# Patient Record
Sex: MALE | Race: WHITE | NOT HISPANIC OR LATINO | URBAN - METROPOLITAN AREA
[De-identification: names, ages, dates, MRNs, and addresses within clinical notes are randomized per-mention and may not be internally consistent; named-entity substitution may affect disease eponyms.]

---

## 2023-09-13 PROBLEM — F41.9 ANXIETY: Status: ACTIVE | Noted: 2023-09-13

## 2023-09-13 PROBLEM — I10 ESSENTIAL HYPERTENSION: Status: ACTIVE | Noted: 2023-09-13

## 2023-09-13 PROBLEM — D64.9 ABSOLUTE ANEMIA: Status: ACTIVE | Noted: 2023-09-13

## 2023-09-13 PROBLEM — K21.9 GASTROESOPHAGEAL REFLUX DISEASE: Status: ACTIVE | Noted: 2023-09-13

## 2023-09-13 PROBLEM — I83.11 VARICOSE VEINS OF RIGHT LOWER EXTREMITY WITH INFLAMMATION: Status: ACTIVE | Noted: 2023-09-13

## 2023-09-13 PROBLEM — I21.9 ACUTE MYOCARDIAL INFARCTION (MULTI): Status: ACTIVE | Noted: 2023-09-13

## 2023-09-13 PROBLEM — Z91.89 AT RISK FOR BLEEDING: Status: ACTIVE | Noted: 2023-09-13

## 2023-09-13 PROBLEM — G47.10 DAYTIME HYPERSOMNIA: Status: ACTIVE | Noted: 2023-09-13

## 2023-09-13 PROBLEM — I25.118 ATHEROSCLEROTIC HEART DISEASE OF NATIVE CORONARY ARTERY WITH OTHER FORMS OF ANGINA PECTORIS (CMS-HCC): Status: ACTIVE | Noted: 2023-09-13

## 2023-09-13 PROBLEM — E78.2 MIXED HYPERLIPIDEMIA: Status: ACTIVE | Noted: 2023-09-13

## 2023-09-13 PROBLEM — G47.33 OSA (OBSTRUCTIVE SLEEP APNEA): Status: ACTIVE | Noted: 2023-09-13

## 2023-09-13 PROBLEM — E34.9 HYPOTESTOSTERONEMIA: Status: ACTIVE | Noted: 2023-09-13

## 2023-09-13 PROBLEM — E66.01 MORBID OBESITY (MULTI): Status: ACTIVE | Noted: 2023-09-13

## 2023-09-13 PROBLEM — E11.9 DIABETES MELLITUS WITHOUT COMPLICATION (MULTI): Status: ACTIVE | Noted: 2023-09-13

## 2023-09-13 RX ORDER — FUROSEMIDE 40 MG/1
40 TABLET ORAL DAILY
COMMUNITY
End: 2024-02-07 | Stop reason: WASHOUT

## 2023-09-13 RX ORDER — NAPROXEN SODIUM 220 MG/1
81 TABLET, FILM COATED ORAL DAILY
COMMUNITY

## 2023-09-13 RX ORDER — METOPROLOL SUCCINATE 100 MG/1
1 TABLET, EXTENDED RELEASE ORAL 2 TIMES DAILY
COMMUNITY
End: 2024-03-01

## 2023-09-13 RX ORDER — CYANOCOBALAMIN 1000 UG/ML
1000 INJECTION, SOLUTION INTRAMUSCULAR; SUBCUTANEOUS
COMMUNITY
Start: 2023-02-02 | End: 2023-12-21 | Stop reason: SDUPTHER

## 2023-09-13 RX ORDER — INSULIN HUMAN 100 [IU]/ML
100 INJECTION, SOLUTION PARENTERAL DAILY
COMMUNITY
End: 2023-11-15 | Stop reason: SDUPTHER

## 2023-09-13 RX ORDER — RANOLAZINE 500 MG/1
500 TABLET, EXTENDED RELEASE ORAL 2 TIMES DAILY
COMMUNITY

## 2023-09-13 RX ORDER — TADALAFIL 20 MG/1
20 TABLET ORAL DAILY PRN
COMMUNITY
Start: 2022-11-11 | End: 2024-02-07 | Stop reason: WASHOUT

## 2023-09-13 RX ORDER — LOSARTAN POTASSIUM 50 MG/1
1 TABLET ORAL 2 TIMES DAILY
COMMUNITY
End: 2024-02-16

## 2023-09-13 RX ORDER — ATORVASTATIN CALCIUM 40 MG/1
1 TABLET, FILM COATED ORAL DAILY
COMMUNITY
End: 2024-03-08

## 2023-09-13 RX ORDER — INSULIN DEGLUDEC 100 U/ML
46 INJECTION, SOLUTION SUBCUTANEOUS 2 TIMES DAILY
COMMUNITY
End: 2024-05-10 | Stop reason: SDUPTHER

## 2023-09-13 RX ORDER — DILTIAZEM HYDROCHLORIDE 120 MG/1
120 TABLET, FILM COATED ORAL 2 TIMES DAILY
COMMUNITY
End: 2024-04-15

## 2023-09-13 RX ORDER — NITROGLYCERIN 0.4 MG/1
0.4 TABLET SUBLINGUAL EVERY 5 MIN PRN
COMMUNITY

## 2023-09-13 RX ORDER — FERROUS SULFATE 325(65) MG
65 TABLET, DELAYED RELEASE (ENTERIC COATED) ORAL
COMMUNITY
Start: 2023-02-06 | End: 2024-02-07 | Stop reason: ALTCHOICE

## 2023-10-26 NOTE — PROGRESS NOTES
"Verbal consent of the patient and/or verbal parental consent for patients under the age of 18 have been obtained to conduct a physical examination at this office visit.    Established patient  History Of Present Illness  10/30/23Ned Ernandez is a 43 y.o. male who presents to review his BILATERAL knee MRI which was performed at [facility]. Pt continues to have bilateral knee pain R >L. Pt reports intermittent pain on lateral and posterior knees. Worse with standing for long periods of time and with exertion. Currently treating pain with Tylenol Arthritis, Move Free, and turmeric-curcumin supplements and ice. Pt has been wearing good supportive shoes and inserts and wears compression stockings. Pt completed physical therapy and is continuing with HEP. Pt feels pain has lessened in frequency since starting supplements and doing PT. Walking up and down stairs causes pain as he has to go one foot at a time. He is an  and sits typically all day.  He would like to start injections as soon as possible.  He says that since he is plateaued so much during physical therapy and it has not helped much she is hoping that the injections will help.  I told them are going to have to get approval for Zilretta injections since he is diabetic for his right knee for his distal IT band syndrome friction syndrome.  Also had to get approval for viscosupplementation injections as well.  We also discussed he has to talk to his cardiologist who prescribed his blood thinner to see what they want us to do for stopping before injections and keeping him off of it after injections.    Last Recorded Vitals  /70   Pulse 69   Ht 1.727 m (5' 8\")   Wt (!) 181 kg (400 lb)   BMI 60.82 kg/m²      All previous Progress Notes and imaging results related to this patients chief complaint have been reviewed in preparation for this examination.    Past Medical History  He has a past medical history of Diabetes mellitus type 1 " (CMS/[facility]) and MI (myocardial infarction) (CMS/[facility]).    Surgical History  He has a past surgical history that includes Cardiac catheterization (11/2014); Arterial bypass surgry (12/2014); Coronary stent placement (08/31/2020); IR ablation vein (11/2022); and Spider vein injection (Right, 11/2022).     Social History  He reports that he has never smoked. He has never used smokeless tobacco. He reports that he does not currently use alcohol. He reports that he does not use drugs.    Family History  Family History   Problem Relation Name Age of Onset    No Known Problems Mother      No Known Problems Father      No Known Problems Sister      Diabetes Maternal Grandfather      Cancer Paternal Grandmother      Diabetes Paternal Grandmother      Heart disease Paternal Grandfather          Allergies  Patient has no known allergies.    Historical Clinical Intake  July 7, 2023 Verbal consent of the patient and/or verbal parental consent for patients under the age of 18 have been obtained to conduct a physical examination at this office visit. Ned is a 42 year old male who was graciously referred into the office by Dr. López who present for an initial evaluation of his BILATERAL knee pain R>L. He reports a history of RIGHT knee pain with an injury occurring at 18 years old while playing racket ball. He describes his RIGHT knee pain as a throbbing ache proximal to the posterolateral aspect of his patella that will radiate into his right Achilles tendon. He states his LEFT knee pain has gotten significantly worse since his compensating for his right knee occurs along the medial aspect which he also describes as a throbbing ache. Ned has positive crepitus with reported popping with ambulation. He denies any numbness, tingling or instability. He rates his pain at 6/10 today. He takes OTC Aleve alternating with OTC Tylenol and elevates his BLE for pain management with minimal to no relief. His PCP, Dr. López, ordered an US of his  RLE which was negative for DVT prior to this examination. I stressed to him the importance based off his medical history of having open heart surgery already that he should not be taking any non steroid anti-inflammatory drugs whatsoever he should only be taking Tylenol. We talked in detail about extra supplements that he could take. We also talked in detail about a significant amount of weight gain that he needs to start being very attentive to is very active and we talked about modifications and exercises that he can do regularly.  ------------------------  September 20, 2023 Verbal consent of the patient and/or verbal parental consent for patients under the age of 18 have been obtained to conduct a physical examination at this office visit. Ned is a newly established patient who is present here today for a reevaluation of his BILATERAL knees. Ned states that he has not noticed any significant change in symptoms; and he continues to c/o bony pain along the posterior medial aspect of both knees He is taking Curcumin/Tumeric and OTC Move Free as directed previously and is also taking Tylenol Arthritis with no change in symptoms. He is unable to take NSAIDS due to history of heart surgery. He has completed 6-8 weeks of physical therapy with no significant improvement in knee pain and is continuing therapy and performing his home exercise program. He was referred back to our office for reevaluation as he was not improving. He states that his RIGHT knee hurts more than his LEFT. Pain is currently 4/10 in right posterior lateral knee and 1/10 on the LEFT knee. Pain is worse with climbing stairs, squatting, and prolonged walking. He was referred back to office by PT because not getting any better and actuaaly getting worse.      General Examination:  GENERAL APPEARANCE: appears well, pleasant, and cooperative, NAD.   HEENT: normal, unremarkable.   NECK: supple.   HEART: RRR, normal S1S2.   LUNGS: clear to auscultation  bilaterally.   ABDOMEN: soft, NT/ND, BS present.   EXTREMITIES: no cyanosis, clubbing.   SKIN: no rash or cellulitis.   NEUROLOGIC EXAM: awake, A&O x 3, CN's II-XII grossly intact.   PSYCH: good eye contact, appropriate mood and affect .            General (KNEE):  Location:  BILATERAL RIGHT laterally more so than LEFT   Erythema: Negative.   Edema:  Positive: BILATERAL .   Effusion: Negative.   Warmth: Negative.   Percussion Test: Positive: BILATERAL Posterior Lateral Tibial Plateau , Distal Lateral Femoral Condyle.   Tuning Fork Test: Positive: BILATERAL Posterior Lateral Tibial Plateau , Distal Lateral Femoral Condyle.   Ecchymosis/Bruising: Negative.   Abrasions: Negative.   Palpation: Positive: BILATERAL Tenderness to Palpation over the medial joint line as well as around the medial lateral patellar retinaculum, distal IT bands right more than left, Posterior Lateral Tibial Plateau , Distal Lateral Femoral Condyle.   Baker's Cyst: Negative.   Orientation: Symmetrical.   Range of Motion: FROM, Not Painful  Knee Flexion ( 0-135 degrees)  Knee Extension ( 0-15 degrees)  Hip Flexion (120-130 degrees)  Hip Extension (10-20 degrees)  Hip ABduction towards body (45 degrees)  Hip ADduction away from body (30 degrees)  Internal Rotation (45 degrees)          External Rotation (50 degrees).   All findings remain the same since last visit with no relief.     Muscle Strength:   +5/+5 Quadricep Extension  +5/+5 Hamstring Flexion  +5+5 Hip Flexion  +5+5 Hip Extension  +5/+5 Hip ABduction away from body  +5/+5 Hip ADduction towards body  +5/+5 Hip Internal Rotation  +5/+5 Hip External Rotation  +5+5 IT-Band  +5+5 Gastrocnemius  +5+5 Soleus.     DTR/Neurological:   +2/+4 Patellar (L4)  +2/+4 Posterior Tibialis Reflex and Medial Hamstrings (L5)  +2/+4 Achilles (S1).            Sensation/Neurological Lumbar:    Negative, Sensation intact:  L1: Low back, hips, and groin  L2: Low back and front of inside of upper leg/thigh  L3:  Low back and front of upper leg/thigh  L4: Low back, front of upper leg/thigh, front of lower leg/calf, front of medial area of knee, and inside of ankle  L5: Low back, front and lateral knee, front and outside of lower leg/calf, top and bottom of foot and first four toes especially big toe.     Sensation/Neurological Sacrum:   Negative, Sensation Intact, 2 -Point Discrimination Test: Negative  S1: low back, back of upper leg/thigh, back and inside of lower leg, calf and little toe  S2: Buttocks, genitals, back of upper leg/thigh and calves  S3: Buttocks and genitals  S4: Buttocks  S5: Buttocks.   Pain with Squat:  Positive:.   Pain with Sumo Squat:  Positive:.   Vascular: +2/+4 Femoral, +2/+4 Dorsalis Pedis, +2/+4 Posterior Tibial  Capillary Refill less than 2 seconds.   Feet/Foot: Positive Valgus Foot Deformity (Pes Planus) BILATERAL.   Leg length discrepancy:  Positive: Noted on physical examination standing erect pelvis x-ray shows minimal leg-length discrepancy.   All findings remain the same since last visit with no relief.      Diagnostic studies:  Standing erect pelvis x-ray shows the following:  Left leg shorter than right leg at the iliac crest by 6 millimeters mm  Right leg shorter than left leg at Sacral Base by 6 millimeters mm  Left leg shorter than right leg at midline Femoral Heads by 4 mm  Overall difference left leg shorter than right leg by approximately 4 millimeters                    PROCEDURE: PELVIS 1 OR 2 VIEW - TXR 0039  REASON FOR EXAM: LEG LENGTH DISCREPANCY  RESULT: MRN: [id number] Patient Name: KENDALL HITCHCOCK  STUDY: PELVIS 1 OR 2 VIEW 7/7/2023 8:15 am  INDICATION: LEG LENGTH DISCREPANCY  COMPARISON: None available.    ACCESSION NUMBER(S): [id number]  ORDERING CLINICIAN: AUSTEN BONNER    TECHNIQUE: Erect AP view of the pelvis    FINDINGS:  Normal mineralization. The hip joint space is maintained bilaterally. No fracture, dislocation, or bony abnormality is seen. The pelvic ring is intact  without fracture or disruption.    IMPRESSION PELVIS XR:  Normal erect AP view of the pelvis    Dictation workstation: [id number]  Original Interpreting Physician: ISRRAEL STOVER M.D.  Original Transcribed by/Date: [facility] Jul 7 2023 7:42A  ----------------------------------------------------  PROCEDURE: KNEE BI MIN 4 VIEW - TXR 0220  REASON FOR EXAM: BILAT KNEE PAIN  RESULT: MRN: [id number] Patient Name: KENDALL HITCHCOCK  STUDY: KNEE BI MIN 4 VIEW 7/7/2023 8:15 am  INDICATION: BILAT KNEE PAIN  COMPARISON: None available.    ACCESSION NUMBER(S): [id number]  ORDERING CLINICIAN: AUSTEN BONNER    TECHNIQUE: Four views of each knee are completed with 3 of the images bilaterally performed in the erect position.    FINDINGS:  On the LEFT, there is narrowing of the medial femorotibial compartment as well as the patellofemoral compartment with mild osteophytosis of the patellar articular surface and a tiny spur projecting from medial tibial plateau. There is minimal suprapatellar effusion.    On the RIGHT, there are surgical clips along the posteromedial aspect of the distal portion of the right upper leg and right knee. The medial compartment is narrowed with small osteophytes projecting from the medial tibial plateau. Small spurs also arise from the patellar articular surface. The patellofemoral compartment is also narrowed.    IMPRESSION BILATERAL KNEE XR:  There is narrowing of the patellofemoral and medial compartments of each knee with the medial compartmental narrowing more pronounced on the right than on the left. There is mild osteophytosis of each knee observed as well.    Dictation workstation: [id number]  Original Interpreting Physician: ISRRAEL STOVER M.D.  Original Transcribed by/Date: [facility] Jul 7 2023 7:42A  ----------------------------------------------------  PROCEDURE: LOWER LEG RT NON VASC LIMITED - WUS 2647  REASON FOR EXAM: PAIN IN RIGHT KNEE  RESULT: MRN: [id number] Patient Name: KENDALL HITCHCOCK  STUDY:LOWER LEG RT NON  [facility] LIMITED; 6/16/2023 3:49 pm  INDICATION:PAIN IN RIGHT KNEE posteriorly.  COMPARISON:None    ACCESSION NUMBER(S):[id number]  ORDERING CLINICIAN:CHEKO WALSH    TECHNIQUE: Using a combination of gray scale ultrasound, spectral waveform analysis, compression technique, pulsed and color Doppler, the deep venous system of the right popliteal fossa was examined.                            FINDINGS:  No cystic or solid lesions are visualized in the right popliteal fossa; specifically no Baker's cyst is identified.  The popliteal vein is compressible and displays normal flow signal.  There is no evidence of deep venous thrombosis.    IMPRESSION RLE US:  Normal ultrasound of the right popliteal fossa.    Dictation workstation: [id number]  Original Interpreting Physician: ABBY PALMA M.D.  Original Transcribed by/Date: [facility] Jun 16 2023 3:04P              Knee - ACL:  Anterior Drawer Test: Negative.   Lachman Test: Negative.   Prone Lachman Test: Negative.   Lelli Test: Negative.   Pivot Shift Test: Negative.   Crossover Test: Negative.     KNEE - FAT PAD:  Squeeze Test Knee Bent going into extension: Negative.     KNEE - IT BAND:  Belkys Test:  Positive: Right greater than left.   Hobbs Test: Positive: Right greater than left.   All findings remain the same since last visit with no relief.     KNEE - MCL / LCL:  Valgus Stress Test:  90 degrees: Negative, 20-30 degrees: Negative.   Varus Stress Test:  90 degrees: Negative, 20-30 degrees: Negative.   Apley Distraction Test:  Positive:, Medial:, MCL:.   Thessaly Test:  Positive Medial meniscus.   All findings remain the same since last visit with no relief.     KNEE - MENISCUS:  Apley Compression Test:  Positive, Medial MCL.   Duck Walk Test:  Negative.   Stienmann 1 Test:  Negative.   Stienmann 2 Test:  Negative.   Porfirio Test:  Positive: BILATERAL lateral meniscus .   Bragard's Test:  Negative.   Bounce Home Test:  Negative.   Payr Test:  Negative.   Thessaly Test:   Positive:, Medial meniscus.   Yordan's Test:  Negative.   All findings remain the same since last visit with no relief.     KNEE - PATELLA:  Apprehension Test:  Negative.   Glide Test:  Negative.   Facet Tenderness Test:  Negative.   Medial Patellar Plica Test:  Negative.   Grind Test:  Positive:, Relieved with lateral pressure on patella.   Patella Tracking Test:  Negative.   Medial/Lateral Subluxation Test:  Negative.   Suppression Test:  Negative.   All findings remain the same since last visit with no relief.     Knee - PCL/POSTERIOR LATERAL CORNER:  Posterior Drawer Test: Negative.   Jimenes 90/90 (Sag Sign) Test: Negative.   Dial Test: Negative.   Reverse Lachman Test: Negative.   Quad Activation Test: Negative.   ER / Recurvatum Test: Negative.   Reverse Pivot Shift Test: Negative.     KNEE - POPLITEUS:  Jean Pierre's Test: Negative.     KNEE - QUADRICEPS:  Dreyer Test: Negative.   VMO Test: Negative.   VLO Test: Negative.     Hip/Pelvis - Sacrum:  Leg Length Supine:  Negative, Verified with standing erect pelvis X-ray  Leg Length Supine to Seated (Derbolowsky Sign):  Negative, Verified with standing erect pelvis X-ray   Standing Flexion Test:  Negative.   Seated Flexion Test:  Negative   Spring Test:  Negative   Sacral Somatic Dysfunction:  Negative   Sacroiliac (SI) Joint Fixation Test:  Negative   Hip Flexor Tightness:  Negative   Hamstring Tightness:  Positive.   All findings remain the same since last visit with no relief.     General (LOWER LEG/ANKLE/FEET):  Location: Right Achilles compensatory for knee.   Erythema: Negative.   Edema:  Positive, Diffusely, Posterior.   Effusion: Negative.   Warmth: Negative.   Ecchymosis/Bruising: Negative.   Percussion Test:  Positive, Calcaneus, Insertion of the Achilles Tendon.   Tuning Fork Test: Negative.   Abrasions: Negative.   Orientation: Positive Asymmetrical, because of swelling.   ROM: FROM, however pain with dorsiflexion.   All findings remain the same since  last visit with no relief.     Muscle Strength:    Positive:,  +3-+4/+5Planar Flexion Causes pain  +3-+4/+5 Dorsi Flexion Causes pain  +5/+5 Inversion  +5/+5 Eversion  +5/+5 Plantarflexion in combination with inversion  +5/+5 Plantarflexion in combination with eversion  +5/+5 Dorsiflexion in combination with inversion (Posterior Tibialis)  +5/+5 Dorsiflexion in combination with eversion (Peroneal Brevis)  +5/+5 Dorsiflexion in combination with eversion and flexion of great toe (Peroneal Longus).    All findings remain the same since last visit with no relief.         DTR/Neurological:   Sensation Intact, 2 Point Discrimination Test: Negative  +2/+4 Achilles Tendon Reflex (S1).            Sensation/Neurological:   Sensation Intact, 2 Point Discrimination Test: Negative  L3: Low back and front of upper leg/thigh  L4: Low back, front of upper leg/thigh, front of lower leg/calf, front of medial area of knee, and inside of ankle  L5: Low back, front and lateral knee, front and outside of lower leg/calf, top and bottom of foot, and first four toes especially big toe  S1: low back, back of upper leg/thigh, back and inside of lower leg, calf and little toe  S2: Buttocks, genitals, back of upper leg/thigh, and calves  S3: Buttocks and genitals.            Palpation:  Positive, Tender to Palpation in the following areas: achilles tendon, insertion of the achilles tendon, origin of the achilles tendon, and calcaneus.   All findings remain the same since last visit with no relief.         Vascular:   +2/+4 Dorsalis Pedis, +2/+4 Posterior Tibialis, Capillary Refill < 2 Seconds.     BILATERAL Valgus Foot Deformity.    Leg/Ankle/Foot - Ankle Impingement:  Posterior Ankle Impingement Test: Negative.   Anterior Ankle Impingement Test: Negative.     Leg/Ankle/Foot - Ankle Instability:  Flexibility Test:  Positive, decreased.   Anterior Drawer Test:  Negative.   Talar Tilt Test:  Negative.   External Rotation Kleiger Plantar Flexion  Test:  Negative.   External Rotation Kleiger Dorsiflexion Test:  Negative.   Squeeze Test:  Positive at distal achilles and calcaneus.   Dorsiflexion Test:  Positive.   Posterior Tibial or Peroneal Tendon Instability Test:  Negative.   Horizontal Squeeze Test:  Positive, at achilles and calcaneus.   Vertical Squeeze Test:  Positive, at achilles and calcaneus.   Plantarflexion:  Positive.   Standing/Walking Test:  Positive, Toe Standing, Heel Standing, Toe Walking, Heel Walking, Painful.   Proprioception Test:  Positive.   Hop Test:  Positive.   All findings remain the same since last visit with no relief.     Leg/Ankle/Foot - DVT:  Kim's Sign: Negative.     Leg/Ankle/Foot - Forefoot:  Strunsky Test:  Negative.   Gaenslen Maneuver Test:  Negative.   Metatarsal Tap Test:  Negative.   Crepitation Test:  Negative.     Leg/Ankle/Foot - Hindfoot Achilles/Calcaneus:  Zhu Compression Test:  Negative.   Hoffa Sign:  Negative.   Achilles Tendon Tap Test:  Positive.   Heel Compression Test:  Negative.   Heel Thump Test:  Positive.   All findings remain the same since last visit with no relief.     Leg/Ankle/Foot - Nerve Irritation:  Eloise Sign:  Negative.   Digital Nerve Stretch Test:  Negative.   Tinel Sign:  Positive at insertion of achilles into calcaneus.   Tourniquet Sign:  Negative.   All findings remain the same since last visit with no relief.       Assessment   1. Acute pain of right knee        2. Acute medial meniscal injury of right knee, subsequent encounter        3. Patellofemoral disorders, right knee        4. Iliotibial band syndrome of right side        5. Patellar tendinitis, right knee        6. Acute pain of left knee        7. Acute medial meniscal injury of left knee, subsequent encounter        8. Patellofemoral disorders, left knee        9. Iliotibial band syndrome of left side        10. Patellar tendinitis, left knee        11. Primary osteoarthritis of both knees        12. Hamstring  tightness        13. Right ankle pain, unspecified chronicity        14. Pain of lower leg, unspecified laterality        15. Achilles tendinitis of right lower extremity        16. Strain of Achilles tendon, unspecified laterality, subsequent encounter        17. Leg length discrepancy        18. Valgus deformity of both feet            Treatment or Intervention:  May continue to alternate ice and moist heat as needed  Once again, reviewed degenerative joint disease of the knees, patellofemoral tracking syndrome, and/or patellar tendinitis as well as meniscus injury in detail with the patient to the level of their understanding; a copy of this handout was provided to the patient at the time of this office visit.  Continue Physical Therapy exercises daily   once again, reviewed home exercises to be performed by the patient routinely  Once again stressed the importance of wearing shoes with good stability control to help with the biomechanics affecting the knees and lower extremities  Once again, stressed the importance of wearing full foot insoles to help with the biomechanics affecting the knees and lower extremities  Once again, recommendation over-the-counter vitamin-D 2 -3000+ milligrams a day, as well as a daily multivitamin.  Continue to take over-the-counter curcumin, turmeric, boswellia, as well as egg shell membrane as directed to aid with joint inflammation.  Continue to take over-the-counter Move Free for joint health.  Once again patient advised regarding the risks and/or potential adverse reactions and/or side effects of any prescribed medications along with any over-the-counter medications or any supplements used. Patient advised to seek immediate medical care if any adverse reactions occur. The patient and/or patient(s) parent(s) verbalized their understanding  Reviewed BILATERAL knee MRI in detail with the patient and/or patients parent/legal guardian to their level of understanding; a copy of these  results were provided to the patient and/or patients parent/legal guardian at the time of this office visit. Discussed treatment options with the patient and/or patients parent/legal guardian in detail to the level of their understanding. The patient and/or patients parent/legal guardian verbalized their understanding and agreement to the treatment plan at the time of this office visit.   Discussed in detail with the patient to the level of their understanding the possibility in the future of regenerative injections versus corticosteroid injections versus viscosupplementation injections versus a combination  Once again, talked in detail about exercise modifications and how to avoid repetitive overuse his knees.  Once again, talked about different diets as well as intermittent fasting  Once again, possibility in the future will have patient get 4 millimeter heel lift placed into his left shoe to accommodate leg-length discrepancy found on standing erect pelvis x-ray  Approval for joint lubrication injections as well as Zilretta since he is diabetic submitted from the office at this time today   Discuss with cardiologist when to get off and start back on the blood thinner before or after injections   Follow up once approval for joint lubrication and Zilretta or sooner if needed.     Diagnostic studies:  No additional imaging or laboratory studies are needed at this time.    Activity Instructions, Restrictions, and Accommodations:  The family has been provided a note (after visit summary) outlining all current activity instructions, restrictions, and accommodations.    Consultations/Referrals:  None at this time.    Follow-up Follow up once approval for joint lubrication and Zilretta or sooner if needed. Barbara REYNA, attest this documentation has been prepared under the direction and in the presence of Domi Woodruff D.O. By signing below, Jair REYNA D.O, personally performed the services described in  this documentation. All medical record entries made by the scribe were at my direction and in my presence. I have reviewed the chart and agree that the record reflects my personal performance and is accurate and complete. Please note that this report has been partially produced using speech recognition software. It may contain errors related to grammar, punctuation or spelling. Electronically signed, but not reviewed. Jair Woodruff D.O. Director of Sports Medicine.     LYSSA KRAUS on 10/30/23 at 4:04 PM.     Jair Woodruff DO, FAOASM

## 2023-10-30 ENCOUNTER — OFFICE VISIT (OUTPATIENT)
Dept: SPORTS MEDICINE | Facility: CLINIC | Age: 43
End: 2023-10-30
Payer: COMMERCIAL

## 2023-10-30 VITALS
HEART RATE: 69 BPM | HEIGHT: 68 IN | BODY MASS INDEX: 47.74 KG/M2 | SYSTOLIC BLOOD PRESSURE: 128 MMHG | DIASTOLIC BLOOD PRESSURE: 70 MMHG | WEIGHT: 315 LBS

## 2023-10-30 DIAGNOSIS — M22.2X1 PATELLOFEMORAL DISORDERS, RIGHT KNEE: ICD-10-CM

## 2023-10-30 DIAGNOSIS — M79.669 PAIN OF LOWER LEG, UNSPECIFIED LATERALITY: ICD-10-CM

## 2023-10-30 DIAGNOSIS — M25.562 ACUTE PAIN OF LEFT KNEE: ICD-10-CM

## 2023-10-30 DIAGNOSIS — S83.8X1D ACUTE MEDIAL MENISCAL INJURY OF RIGHT KNEE, SUBSEQUENT ENCOUNTER: ICD-10-CM

## 2023-10-30 DIAGNOSIS — M76.61 ACHILLES TENDINITIS OF RIGHT LOWER EXTREMITY: ICD-10-CM

## 2023-10-30 DIAGNOSIS — S86.019D: ICD-10-CM

## 2023-10-30 DIAGNOSIS — S83.8X2D ACUTE MEDIAL MENISCAL INJURY OF LEFT KNEE, SUBSEQUENT ENCOUNTER: ICD-10-CM

## 2023-10-30 DIAGNOSIS — M22.2X2 PATELLOFEMORAL DISORDERS, LEFT KNEE: ICD-10-CM

## 2023-10-30 DIAGNOSIS — M76.31 ILIOTIBIAL BAND SYNDROME OF RIGHT SIDE: ICD-10-CM

## 2023-10-30 DIAGNOSIS — M17.0 PRIMARY OSTEOARTHRITIS OF BOTH KNEES: ICD-10-CM

## 2023-10-30 DIAGNOSIS — M21.70 LEG LENGTH DISCREPANCY: ICD-10-CM

## 2023-10-30 DIAGNOSIS — M76.32 ILIOTIBIAL BAND SYNDROME OF LEFT SIDE: ICD-10-CM

## 2023-10-30 DIAGNOSIS — M76.51 PATELLAR TENDINITIS, RIGHT KNEE: ICD-10-CM

## 2023-10-30 DIAGNOSIS — M76.52 PATELLAR TENDINITIS, LEFT KNEE: ICD-10-CM

## 2023-10-30 DIAGNOSIS — M25.571 RIGHT ANKLE PAIN, UNSPECIFIED CHRONICITY: ICD-10-CM

## 2023-10-30 DIAGNOSIS — Q66.6 VALGUS DEFORMITY OF BOTH FEET: ICD-10-CM

## 2023-10-30 DIAGNOSIS — M25.561 ACUTE PAIN OF RIGHT KNEE: ICD-10-CM

## 2023-10-30 DIAGNOSIS — M62.89 HAMSTRING TIGHTNESS: ICD-10-CM

## 2023-10-30 PROCEDURE — 3078F DIAST BP <80 MM HG: CPT | Performed by: FAMILY MEDICINE

## 2023-10-30 PROCEDURE — 4010F ACE/ARB THERAPY RXD/TAKEN: CPT | Performed by: FAMILY MEDICINE

## 2023-10-30 PROCEDURE — 99214 OFFICE O/P EST MOD 30 MIN: CPT | Performed by: FAMILY MEDICINE

## 2023-10-30 PROCEDURE — 1036F TOBACCO NON-USER: CPT | Performed by: FAMILY MEDICINE

## 2023-10-30 PROCEDURE — 3074F SYST BP LT 130 MM HG: CPT | Performed by: FAMILY MEDICINE

## 2023-10-30 RX ORDER — GLUCOSAMINE/CHONDRO SU A 500-400 MG
1 TABLET ORAL 3 TIMES DAILY
COMMUNITY

## 2023-10-30 RX ORDER — DEXTROMETHORPHAN HYDROBROMIDE, GUAIFENESIN 5; 100 MG/5ML; MG/5ML
650 LIQUID ORAL EVERY 8 HOURS PRN
COMMUNITY
End: 2024-02-07 | Stop reason: ALTCHOICE

## 2023-10-30 ASSESSMENT — PAIN SCALES - GENERAL: PAINLEVEL_OUTOF10: 3

## 2023-10-30 ASSESSMENT — PAIN DESCRIPTION - DESCRIPTORS: DESCRIPTORS: SHARP

## 2023-10-30 ASSESSMENT — PAIN - FUNCTIONAL ASSESSMENT: PAIN_FUNCTIONAL_ASSESSMENT: 0-10

## 2023-10-30 NOTE — PATIENT INSTRUCTIONS
May continue to alternate ice and moist heat as needed  Once again, reviewed degenerative joint disease of the knees, patellofemoral tracking syndrome, and/or patellar tendinitis as well as meniscus injury in detail with the patient to the level of their understanding; a copy of this handout was provided to the patient at the time of this office visit.  Continue Physical Therapy 1-2 times a week for 8-10 weeks with manual therapy as well as dry needling and IASTM  Once again, reviewed home exercises to be performed by the patient routinely  Stressed the importance of wearing shoes with good stability control to help with the biomechanics affecting the knees and lower extremities  Once again, stressed the importance of wearing full foot insoles to help with the biomechanics affecting the knees and lower extremities  Once again, recommendation over-the-counter vitamin-D 2 -3000+ milligrams a day, as well as a daily multivitamin.  Continue to take over-the-counter curcumin, turmeric, boswellia, as well as egg shell membrane as directed to aid with joint inflammation.  Continue to take over-the-counter Move Free for joint health.  Once again patient advised regarding the risks and/or potential adverse reactions and/or side effects of any prescribed medications along with any over-the-counter medications or any supplements used. Patient advised to seek immediate medical care if any adverse reactions occur. The patient and/or patient(s) parent(s) verbalized their understanding  Reviewed BILATERAL knee MRI in detail with the patient and/or patients parent/legal guardian to their level of understanding; a copy of these results were provided to the patient and/or patients parent/legal guardian at the time of this office visit. Discussed treatment options with the patient and/or patients parent/legal guardian in detail to the level of their understanding. The patient and/or patients parent/legal guardian verbalized their  understanding and agreement to the treatment plan at the time of this office visit.   Discussed in detail with the patient to the level of their understanding the possibility in the future of regenerative injections versus corticosteroid injections versus viscosupplementation injections versus a combination  Once again, talked in detail about exercise modifications and how to avoid repetitive overuse his knees.  Once again, talked about different diets as well as intermittent fasting  Once again, possibility in the future will have patient get 4 millimeter heel lift placed into his left shoe to accommodate leg-length discrepancy found on standing erect pelvis x-ray  Approval for joint lubrication Zilretta submitted from the office at this time today   Discuss with cardiologist when to get off the blood thinner before or after injections   Follow up once approval for joint lubrication and Zilretta or sooner if needed.

## 2023-11-02 ENCOUNTER — TELEPHONE (OUTPATIENT)
Dept: CARDIOLOGY | Facility: CLINIC | Age: 43
End: 2023-11-02
Payer: COMMERCIAL

## 2023-11-06 ENCOUNTER — TELEPHONE (OUTPATIENT)
Dept: SPORTS MEDICINE | Facility: CLINIC | Age: 43
End: 2023-11-06
Payer: COMMERCIAL

## 2023-11-06 NOTE — TELEPHONE ENCOUNTER
10/30/2023-Initiated PA for Zilretta injection (Cortisone) VIA [employer].     11/06/2022- Per [employer]-A prior authorization is required for this patient's plan.  [employer] can assist with the prior authorization. For Prior  Auth support, please fax clinical information to [phone],  or upload it using the online portal. No medical notes or  referrals needed. Patient has a Fully Funded, Commercial  HMO plan through employer [employer] with an  effective date of 01/01/2023. Plan follows [facility] guidelines.  (Zilretta), CPT code  77245 and specialist office visits are covered at 100% by the  payer at the contracted rate with no patient responsibility.  Patient has a deductible of $3000. Deductible has been met.  Out of pocket does not apply to these services. Patient has a  calendar year policy starting from January 1, 2023 to  December 31, 2023.    11/06/2023-Clinical documentation uploaded awaiting on Prior Authorization.

## 2023-11-11 ENCOUNTER — LAB (OUTPATIENT)
Dept: LAB | Facility: LAB | Age: 43
End: 2023-11-11
Payer: COMMERCIAL

## 2023-11-11 DIAGNOSIS — E10.9 TYPE 1 DIABETES MELLITUS WITHOUT COMPLICATIONS (MULTI): Primary | ICD-10-CM

## 2023-11-11 LAB
ALBUMIN SERPL-MCNC: 3.9 G/DL (ref 3.5–5)
ALP BLD-CCNC: 103 U/L (ref 35–125)
ALT SERPL-CCNC: 20 U/L (ref 5–40)
ANION GAP SERPL CALC-SCNC: 16 MMOL/L
AST SERPL-CCNC: 19 U/L (ref 5–40)
BASOPHILS # BLD AUTO: 0.07 X10*3/UL (ref 0–0.1)
BASOPHILS NFR BLD AUTO: 0.7 %
BILIRUB SERPL-MCNC: 0.4 MG/DL (ref 0.1–1.2)
BUN SERPL-MCNC: 12 MG/DL (ref 8–25)
CALCIUM SERPL-MCNC: 9.2 MG/DL (ref 8.5–10.4)
CHLORIDE SERPL-SCNC: 100 MMOL/L (ref 97–107)
CHOLEST SERPL-MCNC: 127 MG/DL (ref 133–200)
CHOLEST/HDLC SERPL: 2.2 {RATIO}
CO2 SERPL-SCNC: 24 MMOL/L (ref 24–31)
CREAT SERPL-MCNC: 1 MG/DL (ref 0.4–1.6)
CREAT UR-MCNC: 139 MG/DL
EOSINOPHIL # BLD AUTO: 0.21 X10*3/UL (ref 0–0.7)
EOSINOPHIL NFR BLD AUTO: 2.1 %
ERYTHROCYTE [DISTWIDTH] IN BLOOD BY AUTOMATED COUNT: 13.8 % (ref 11.5–14.5)
GFR SERPL CREATININE-BSD FRML MDRD: >90 ML/MIN/1.73M*2
GLUCOSE SERPL-MCNC: 178 MG/DL (ref 65–99)
HCT VFR BLD AUTO: 47.8 % (ref 41–52)
HDLC SERPL-MCNC: 57 MG/DL
HGB BLD-MCNC: 14.9 G/DL (ref 13.5–17.5)
IMM GRANULOCYTES # BLD AUTO: 0.03 X10*3/UL (ref 0–0.7)
IMM GRANULOCYTES NFR BLD AUTO: 0.3 % (ref 0–0.9)
LDLC SERPL CALC-MCNC: 56 MG/DL (ref 65–130)
LYMPHOCYTES # BLD AUTO: 2.99 X10*3/UL (ref 1.2–4.8)
LYMPHOCYTES NFR BLD AUTO: 29.5 %
MCH RBC QN AUTO: 27.1 PG (ref 26–34)
MCHC RBC AUTO-ENTMCNC: 31.2 G/DL (ref 32–36)
MCV RBC AUTO: 87 FL (ref 80–100)
MICROALBUMIN UR-MCNC: <12 MG/L (ref 0–23)
MICROALBUMIN/CREAT UR: NORMAL MG/G{CREAT}
MONOCYTES # BLD AUTO: 0.85 X10*3/UL (ref 0.1–1)
MONOCYTES NFR BLD AUTO: 8.4 %
NEUTROPHILS # BLD AUTO: 6 X10*3/UL (ref 1.2–7.7)
NEUTROPHILS NFR BLD AUTO: 59 %
NRBC BLD-RTO: 0 /100 WBCS (ref 0–0)
PLATELET # BLD AUTO: 327 X10*3/UL (ref 150–450)
POTASSIUM SERPL-SCNC: 4.8 MMOL/L (ref 3.4–5.1)
PROT SERPL-MCNC: 6.7 G/DL (ref 5.9–7.9)
RBC # BLD AUTO: 5.49 X10*6/UL (ref 4.5–5.9)
RBC #/AREA URNS AUTO: NORMAL /HPF
SODIUM SERPL-SCNC: 140 MMOL/L (ref 133–145)
SQUAMOUS #/AREA URNS AUTO: NORMAL /HPF
TRIGL SERPL-MCNC: 70 MG/DL (ref 40–150)
URATE SERPL-MCNC: 4 MG/DL (ref 3.6–7.7)
VIT B12 SERPL-MCNC: 938 PG/ML (ref 211–946)
WBC # BLD AUTO: 10.2 X10*3/UL (ref 4.4–11.3)
WBC #/AREA URNS AUTO: NORMAL /HPF

## 2023-11-11 PROCEDURE — 81001 URINALYSIS AUTO W/SCOPE: CPT

## 2023-11-11 PROCEDURE — 80061 LIPID PANEL: CPT

## 2023-11-11 PROCEDURE — 80053 COMPREHEN METABOLIC PANEL: CPT

## 2023-11-11 PROCEDURE — 82043 UR ALBUMIN QUANTITATIVE: CPT

## 2023-11-11 PROCEDURE — 82570 ASSAY OF URINE CREATININE: CPT

## 2023-11-11 PROCEDURE — 82607 VITAMIN B-12: CPT

## 2023-11-11 PROCEDURE — 85025 COMPLETE CBC W/AUTO DIFF WBC: CPT

## 2023-11-11 PROCEDURE — 84550 ASSAY OF BLOOD/URIC ACID: CPT

## 2023-11-11 PROCEDURE — 36415 COLL VENOUS BLD VENIPUNCTURE: CPT

## 2023-11-15 ENCOUNTER — OFFICE VISIT (OUTPATIENT)
Dept: PRIMARY CARE | Facility: CLINIC | Age: 43
End: 2023-11-15
Payer: COMMERCIAL

## 2023-11-15 VITALS
TEMPERATURE: 98.1 F | BODY MASS INDEX: 47.74 KG/M2 | HEIGHT: 68 IN | SYSTOLIC BLOOD PRESSURE: 140 MMHG | HEART RATE: 69 BPM | WEIGHT: 315 LBS | DIASTOLIC BLOOD PRESSURE: 64 MMHG | OXYGEN SATURATION: 95 %

## 2023-11-15 DIAGNOSIS — I10 ESSENTIAL HYPERTENSION: ICD-10-CM

## 2023-11-15 DIAGNOSIS — M65.342 TRIGGER RING FINGER OF LEFT HAND: ICD-10-CM

## 2023-11-15 DIAGNOSIS — E11.9 DIABETES MELLITUS WITHOUT COMPLICATION (MULTI): Primary | ICD-10-CM

## 2023-11-15 LAB — POC HEMOGLOBIN A1C: 8 % (ref 4.2–6.5)

## 2023-11-15 PROCEDURE — 3048F LDL-C <100 MG/DL: CPT | Performed by: FAMILY MEDICINE

## 2023-11-15 PROCEDURE — 4010F ACE/ARB THERAPY RXD/TAKEN: CPT | Performed by: FAMILY MEDICINE

## 2023-11-15 PROCEDURE — 3062F POS MACROALBUMINURIA REV: CPT | Performed by: FAMILY MEDICINE

## 2023-11-15 PROCEDURE — 1036F TOBACCO NON-USER: CPT | Performed by: FAMILY MEDICINE

## 2023-11-15 PROCEDURE — 99213 OFFICE O/P EST LOW 20 MIN: CPT | Performed by: FAMILY MEDICINE

## 2023-11-15 PROCEDURE — 3078F DIAST BP <80 MM HG: CPT | Performed by: FAMILY MEDICINE

## 2023-11-15 PROCEDURE — 3077F SYST BP >= 140 MM HG: CPT | Performed by: FAMILY MEDICINE

## 2023-11-15 PROCEDURE — 83036 HEMOGLOBIN GLYCOSYLATED A1C: CPT | Performed by: FAMILY MEDICINE

## 2023-11-15 RX ORDER — POLYMYXIN B SULFATE AND TRIMETHOPRIM SULFATE 100000; 1 [USP'U]/ML; MG/ML
1 SOLUTION/ DROPS OPHTHALMIC EVERY 6 HOURS
COMMUNITY
Start: 2023-06-09

## 2023-11-15 RX ORDER — SYRINGE WITH NEEDLE, 1 ML 25GX5/8"
1 SYRINGE, EMPTY DISPOSABLE MISCELLANEOUS
COMMUNITY
Start: 2023-10-01 | End: 2024-01-08

## 2023-11-15 RX ORDER — INSULIN HUMAN 100 [IU]/ML
INJECTION, SOLUTION PARENTERAL
Qty: 90 ML | Refills: 3 | Status: SHIPPED | OUTPATIENT
Start: 2023-11-15

## 2023-11-15 ASSESSMENT — ENCOUNTER SYMPTOMS
DIFFICULTY URINATING: 0
DIZZINESS: 0
NAUSEA: 0
ENDOCRINE NEGATIVE: 1
SHORTNESS OF BREATH: 0
DIARRHEA: 0
FEVER: 0

## 2023-11-15 ASSESSMENT — PATIENT HEALTH QUESTIONNAIRE - PHQ9
SUM OF ALL RESPONSES TO PHQ9 QUESTIONS 1 & 2: 0
1. LITTLE INTEREST OR PLEASURE IN DOING THINGS: NOT AT ALL
2. FEELING DOWN, DEPRESSED OR HOPELESS: NOT AT ALL

## 2023-11-15 ASSESSMENT — PAIN SCALES - GENERAL: PAINLEVEL: 0-NO PAIN

## 2023-11-15 NOTE — PROGRESS NOTES
"Subjective   Patient ID: Ned Ernandez is a 43 y.o. male who presents for 6 month dm follow up and foot exam due.    DM  HTN  Trigger finger 4th finger left hand       Review of Systems   Constitutional:  Negative for fever.        Also see HPI   Eyes:  Negative for visual disturbance.   Respiratory:  Negative for shortness of breath.    Cardiovascular:  Negative for chest pain.   Gastrointestinal:  Negative for diarrhea and nausea.   Endocrine: Negative.    Genitourinary:  Negative for difficulty urinating.   Skin:  Negative for rash.   Neurological:  Negative for dizziness.        No focal deficits   Psychiatric/Behavioral:  Negative for suicidal ideas.    All other systems reviewed and are negative.      Objective   /64   Pulse 69   Temp 36.7 °C (98.1 °F)   Ht 1.727 m (5' 8\")   Wt (!) 185 kg (407 lb)   SpO2 95%   BMI 61.88 kg/m²     Physical Exam  Vitals and nursing note reviewed.   Constitutional:       Appearance: Normal appearance.   HENT:      Head: Normocephalic and atraumatic.   Eyes:      Extraocular Movements: Extraocular movements intact.      Conjunctiva/sclera: Conjunctivae normal.   Cardiovascular:      Rate and Rhythm: Normal rate and regular rhythm.      Heart sounds: Normal heart sounds.   Pulmonary:      Effort: Pulmonary effort is normal.      Breath sounds: Normal breath sounds.      Comments: Lungs essentially CTA b/l  Abdominal:      General: There is no distension.      Palpations: Abdomen is soft. There is no mass.      Tenderness: There is no abdominal tenderness.   Musculoskeletal:      Right lower leg: No edema.      Left lower leg: No edema.   Skin:     Coloration: Skin is not jaundiced.      Findings: No rash.   Neurological:      General: No focal deficit present.      Mental Status: He is alert and oriented to person, place, and time.   Psychiatric:         Mood and Affect: Mood normal.         Behavior: Behavior normal.         Thought Content: Thought content normal.    "      Judgment: Judgment normal.       Assessment/Plan   Problem List Items Addressed This Visit             ICD-10-CM    Diabetes mellitus without complication (CMS/HCC) - Primary E11.9    Relevant Medications    insulin regular (HumuLIN R Regular U-100 Insuln) 100 unit/mL injection    Other Relevant Orders    POCT glycosylated hemoglobin (Hb A1C) manually resulted    Essential hypertension I10

## 2023-11-16 NOTE — TELEPHONE ENCOUNTER
11/16/2023- Per Radha Spoke to the PA Dept @ [facility] ([phone]): Insurance received the Pre-Certification request for the drug ZILRETTA ([id number]) on 11/14/2023. It is currently under review. Tracking case ID: [id number]. It could take up to 2 business days to complete the process. Rep: James T Ref: [id number].    PENDING PA

## 2023-11-21 ENCOUNTER — TELEPHONE (OUTPATIENT)
Dept: SPORTS MEDICINE | Facility: CLINIC | Age: 43
End: 2023-11-21
Payer: COMMERCIAL

## 2023-11-21 NOTE — TELEPHONE ENCOUNTER
Annabelle with [employer] phoned regarding the patient's Prior Authorization request, she is requesting a return call at [phone]

## 2023-11-21 NOTE — TELEPHONE ENCOUNTER
11/21/2023- per Evans Spoke to the PA Dept @ [facility] ([phone]): Insurance received the Pre-Certification request for the drug ZILRETTA ([id number]) on 11/14/2023. It is currently pending for missing information. Tracking case ID: [id number]. As per the rep, they need to know if the patient has tried and failed intra-articular steroid in both knees. [facility] can call @ [phone] or can fax all the missing clinical information @ [phone], in order to proceed further. Rep: Ramirez T. Ref: [id number].    Responded- Patient is a Diabetic & that is why  is seeking Zilretta.    PENDING RESPONSE

## 2023-11-22 NOTE — TELEPHONE ENCOUNTER
11/22/2023- Per [employer] patient has not tried and failed normal intra-articular steroid in both knees so the PA for ZILRETTA was DENIED. Spoke with  we will schedule patient for a normal Cortisone injection into his BILATERAL KNEES in hopes the insurance company will approve the viscosupplementation injections.       Per Misah- PA APPROVAL for EUFLEXXA INJECTIONS  into the patients BILATERAL KNEES.     Please call patient to schedule a CORTISONE INJECTION then TWO WEEKS LATER start the EUFLEXXA SERIES. The Euflexxa series will be an INJECTION into both of his knees 1x a week for 3 weeks.     PA APPROVAL SCANNED INTO PATIENTS CHART.     Called patient and he is scheduled for his series of injections.

## 2023-11-27 PROBLEM — M76.32 ILIOTIBIAL BAND SYNDROME OF LEFT SIDE: Status: ACTIVE | Noted: 2023-11-27

## 2023-11-27 PROBLEM — M25.562 ACUTE PAIN OF LEFT KNEE: Status: ACTIVE | Noted: 2023-11-27

## 2023-11-27 PROBLEM — M25.571 RIGHT ANKLE PAIN: Status: ACTIVE | Noted: 2023-11-27

## 2023-11-27 PROBLEM — S86.019A STRAIN OF ACHILLES TENDON: Status: ACTIVE | Noted: 2023-11-27

## 2023-11-27 PROBLEM — M62.89 HAMSTRING TIGHTNESS: Status: ACTIVE | Noted: 2023-11-27

## 2023-11-27 PROBLEM — M76.51 PATELLAR TENDINITIS, RIGHT KNEE: Status: ACTIVE | Noted: 2023-11-27

## 2023-11-27 PROBLEM — M25.561 ACUTE PAIN OF RIGHT KNEE: Status: ACTIVE | Noted: 2023-11-27

## 2023-11-27 PROBLEM — M22.2X1 PATELLOFEMORAL DISORDERS, RIGHT KNEE: Status: ACTIVE | Noted: 2023-11-27

## 2023-11-27 PROBLEM — M22.2X2 PATELLOFEMORAL DISORDERS, LEFT KNEE: Status: ACTIVE | Noted: 2023-11-27

## 2023-11-27 PROBLEM — M79.669 PAIN OF LOWER LEG: Status: ACTIVE | Noted: 2023-11-27

## 2023-11-27 PROBLEM — M17.0 PRIMARY OSTEOARTHRITIS OF BOTH KNEES: Status: ACTIVE | Noted: 2023-11-27

## 2023-11-27 PROBLEM — S83.8X2A ACUTE MEDIAL MENISCAL INJURY OF LEFT KNEE: Status: ACTIVE | Noted: 2023-11-27

## 2023-11-27 PROBLEM — M76.52 PATELLAR TENDINITIS, LEFT KNEE: Status: ACTIVE | Noted: 2023-11-27

## 2023-11-27 PROBLEM — Q66.6 VALGUS DEFORMITY OF BOTH FEET: Status: ACTIVE | Noted: 2023-11-27

## 2023-11-27 PROBLEM — M76.31 ILIOTIBIAL BAND SYNDROME OF RIGHT SIDE: Status: ACTIVE | Noted: 2023-11-27

## 2023-11-27 PROBLEM — M21.70 LEG LENGTH DISCREPANCY: Status: ACTIVE | Noted: 2023-11-27

## 2023-11-27 PROBLEM — M76.61 ACHILLES TENDINITIS OF RIGHT LOWER EXTREMITY: Status: ACTIVE | Noted: 2023-11-27

## 2023-11-27 PROBLEM — S83.8X1A ACUTE MEDIAL MENISCAL INJURY OF RIGHT KNEE: Status: ACTIVE | Noted: 2023-11-27

## 2023-11-27 NOTE — PROGRESS NOTES
"Verbal consent of the patient and/or verbal parental consent for patients under the age of 18 have been obtained to conduct a physical examination at this office visit.    Established patient  History Of Present Illness  11/28/23 Ned Ernandez is a 43 y.o. male who presents for a corticosteroid injection into the patients BILATERAL knees. Patient continues to experience pain in both knees around all sides but the lateral and posterior side are worse. He states he constantly feels the bones of both knees popping. Pain is worse when moving from standing to sitting position and ascending stairs. Pain is also worse without shoes. He treats the pain with Tylenol when needed. He continues to wear good supportive footwear with inserts with his 4mm heel lift in LEFT shoe. He completed physical therapy but continues to participate in HEP. He also continues to take Move Free an turmeric supplements. Patient discussed stopping his Xarelto and aspirin with cardiologist, Dr. Alexis during the course of the injections.   I told him we try to get approval for the Zilretta injections however his insurance did not allow it so he had a corticosteroid injection and they wanted to see how that affected his blood sugars.  I told him to monitor his blood sugars regularly and he may have to use some insulin.    Last Recorded Vitals  /64   Pulse 74   Ht 1.727 m (5' 8\")   Wt (!) 181 kg (400 lb)   BMI 60.82 kg/m²      All previous Progress Notes and imaging results related to this patients chief complaint have been reviewed in preparation for this examination/procedure.    Past Medical History  He has a past medical history of Diabetes mellitus type 1 (CMS/[facility]), MI (myocardial infarction) (CMS/[facility]), and Osteoarthritis (10/2023).    Surgical History  He has a past surgical history that includes Cardiac catheterization (11/2014); Arterial bypass surgry (12/2014); Coronary stent placement (08/31/2020); IR ablation vein (11/2022); and " Spider vein injection (Right, 11/2022).     Social History  He reports that he has never smoked. He has never used smokeless tobacco. He reports that he does not currently use alcohol. He reports that he does not use drugs.    Family History  Family History   Problem Relation Name Age of Onset    No Known Problems Mother      No Known Problems Father      No Known Problems Sister      Diabetes Maternal Grandfather      Cancer Paternal Grandmother      Diabetes Paternal Grandmother      Heart disease Paternal Grandfather          Allergies  Patient has no known allergies.    Historical Clinical Intake  July 7, 2023 Verbal consent of the patient and/or verbal parental consent for patients under the age of 18 have been obtained to conduct a physical examination at this office visit. Ned is a 42 year old male who was graciously referred into the office by Dr. López who present for an initial evaluation of his BILATERAL knee pain R>L. He reports a history of RIGHT knee pain with an injury occurring at 18 years old while playing racket ball. He describes his RIGHT knee pain as a throbbing ache proximal to the posterolateral aspect of his patella that will radiate into his right Achilles tendon. He states his LEFT knee pain has gotten significantly worse since his compensating for his right knee occurs along the medial aspect which he also describes as a throbbing ache. Ned has positive crepitus with reported popping with ambulation. He denies any numbness, tingling or instability. He rates his pain at 6/10 today. He takes OTC Aleve alternating with OTC Tylenol and elevates his BLE for pain management with minimal to no relief. His PCP, Dr. López, ordered an US of his RLE which was negative for DVT prior to this examination. I stressed to him the importance based off his medical history of having open heart surgery already that he should not be taking any non steroid anti-inflammatory drugs whatsoever he should only be  taking Tylenol. We talked in detail about extra supplements that he could take. We also talked in detail about a significant amount of weight gain that he needs to start being very attentive to is very active and we talked about modifications and exercises that he can do regularly.  ------------------------  September 20, 2023 Verbal consent of the patient and/or verbal parental consent for patients under the age of 18 have been obtained to conduct a physical examination at this office visit. Ned is a newly established patient who is present here today for a reevaluation of his BILATERAL knees. Ned states that he has not noticed any significant change in symptoms; and he continues to c/o bony pain along the posterior medial aspect of both knees He is taking Curcumin/Tumeric and OTC Move Free as directed previously and is also taking Tylenol Arthritis with no change in symptoms. He is unable to take NSAIDS due to history of heart surgery. He has completed 6-8 weeks of physical therapy with no significant improvement in knee pain and is continuing therapy and performing his home exercise program. He was referred back to our office for reevaluation as he was not improving. He states that his RIGHT knee hurts more than his LEFT. Pain is currently 4/10 in right posterior lateral knee and 1/10 on the LEFT knee. Pain is worse with climbing stairs, squatting, and prolonged walking. He was referred back to office by PT because not getting any better and actuaaly getting worse.  ------------------------------------------------------  10/30/23Ned STEF Ernandez is a 43 y.o. male who presents to review his BILATERAL knee MRI which was performed at [facility]. Pt continues to have bilateral knee pain R >L. Pt reports intermittent pain on lateral and posterior knees. Worse with standing for long periods of time and with exertion. Currently treating pain with Tylenol Arthritis, Move Free, and turmeric-curcumin supplements and  ice. Pt has been wearing good supportive shoes and inserts and wears compression stockings. Pt completed physical therapy and is continuing with HEP. Pt feels pain has lessened in frequency since starting supplements and doing PT. Walking up and down stairs causes pain as he has to go one foot at a time. He is an  and sits typically all day.  He would like to start injections as soon as possible.  He says that since he is plateaued so much during physical therapy and it has not helped much she is hoping that the injections will help.  I told them are going to have to get approval for Zilretta injections since he is diabetic for his right knee for his distal IT band syndrome friction syndrome.  Also had to get approval for viscosupplementation injections as well.  We also discussed he has to talk to his cardiologist who prescribed his blood thinner to see what they want us to do for stopping before injections and keeping him off of it after injections.      General Examination:  GENERAL APPEARANCE: appears well, pleasant, and cooperative, NAD.   HEENT: normal, unremarkable.   NECK: supple.   HEART: RRR, normal S1S2.   LUNGS: clear to auscultation bilaterally.   ABDOMEN: soft, NT/ND, BS present.   EXTREMITIES: no cyanosis, clubbing.   SKIN: no rash or cellulitis.   NEUROLOGIC EXAM: awake, A&O x 3, CN's II-XII grossly intact.   PSYCH: good eye contact, appropriate mood and affect .            General (KNEE):  Location:  BILATERAL RIGHT laterally more so than LEFT   Erythema: Negative.   Edema:  Positive: BILATERAL .   Effusion: Negative.   Warmth: Negative.   Percussion Test: Positive: BILATERAL Posterior Lateral Tibial Plateau , Distal Lateral Femoral Condyle.   Tuning Fork Test: Positive: BILATERAL Posterior Lateral Tibial Plateau , Distal Lateral Femoral Condyle.   Ecchymosis/Bruising: Negative.   Abrasions: Negative.   Palpation: Positive: BILATERAL Tenderness to Palpation over the medial joint line as  well as around the medial lateral patellar retinaculum, distal IT bands right more than left, Posterior Lateral Tibial Plateau , Distal Lateral Femoral Condyle.   Baker's Cyst: Negative.   Orientation: Symmetrical.   Range of Motion: FROM, Not Painful  Knee Flexion ( 0-135 degrees)  Knee Extension ( 0-15 degrees)  Hip Flexion (120-130 degrees)  Hip Extension (10-20 degrees)  Hip ABduction towards body (45 degrees)  Hip ADduction away from body (30 degrees)  Internal Rotation (45 degrees)          External Rotation (50 degrees).   All findings remain the same since last visit with no relief.      Muscle Strength:   +5/+5 Quadricep Extension  +5/+5 Hamstring Flexion  +5+5 Hip Flexion  +5+5 Hip Extension  +5/+5 Hip ABduction away from body  +5/+5 Hip ADduction towards body  +5/+5 Hip Internal Rotation  +5/+5 Hip External Rotation  +5+5 IT-Band  +5+5 Gastrocnemius  +5+5 Soleus.      DTR/Neurological:   +2/+4 Patellar (L4)  +2/+4 Posterior Tibialis Reflex and Medial Hamstrings (L5)  +2/+4 Achilles (S1).            Sensation/Neurological Lumbar:    Negative, Sensation intact:  L1: Low back, hips, and groin  L2: Low back and front of inside of upper leg/thigh  L3: Low back and front of upper leg/thigh  L4: Low back, front of upper leg/thigh, front of lower leg/calf, front of medial area of knee, and inside of ankle  L5: Low back, front and lateral knee, front and outside of lower leg/calf, top and bottom of foot and first four toes especially big toe.      Sensation/Neurological Sacrum:   Negative, Sensation Intact, 2 -Point Discrimination Test: Negative  S1: low back, back of upper leg/thigh, back and inside of lower leg, calf and little toe  S2: Buttocks, genitals, back of upper leg/thigh and calves  S3: Buttocks and genitals  S4: Buttocks  S5: Buttocks.   Pain with Squat:  Positive:.   Pain with Sumo Squat:  Positive:.   Vascular: +2/+4 Femoral, +2/+4 Dorsalis Pedis, +2/+4 Posterior Tibial  Capillary Refill less than 2  seconds.   Feet/Foot: Positive Valgus Foot Deformity (Pes Planus) BILATERAL.   Leg length discrepancy:  Positive: Noted on physical examination standing erect pelvis x-ray shows minimal leg-length discrepancy.   All findings remain the same since last visit with no relief.       Diagnostic studies:  Standing erect pelvis x-ray shows the following:  Left leg shorter than right leg at the iliac crest by 6 millimeters mm  Right leg shorter than left leg at Sacral Base by 6 millimeters mm  Left leg shorter than right leg at midline Femoral Heads by 4 mm  Overall difference left leg shorter than right leg by approximately 4 millimeters                    PROCEDURE: PELVIS 1 OR 2 VIEW - TXR 0039  REASON FOR EXAM: LEG LENGTH DISCREPANCY  RESULT: MRN: [id number] Patient Name: KENDALL HITCHCOCK  STUDY: PELVIS 1 OR 2 VIEW 7/7/2023 8:15 am  INDICATION: LEG LENGTH DISCREPANCY  COMPARISON: None available.     ACCESSION NUMBER(S): [id number]  ORDERING CLINICIAN: AUSTEN BONNER     TECHNIQUE: Erect AP view of the pelvis     FINDINGS:  Normal mineralization. The hip joint space is maintained bilaterally. No fracture, dislocation, or bony abnormality is seen. The pelvic ring is intact without fracture or disruption.     IMPRESSION PELVIS XR:  Normal erect AP view of the pelvis     Dictation workstation: [id number]  Original Interpreting Physician: ISRRAEL STOVER M.D.  Original Transcribed by/Date: [facility] Jul 7 2023 7:42A  ----------------------------------------------------  PROCEDURE: KNEE BI MIN 4 VIEW - TXR 0220  REASON FOR EXAM: BILAT KNEE PAIN  RESULT: MRN: [id number] Patient Name: KENDALL HITCHCOCK  STUDY: KNEE BI MIN 4 VIEW 7/7/2023 8:15 am  INDICATION: BILAT KNEE PAIN  COMPARISON: None available.     ACCESSION NUMBER(S): [id number]  ORDERING CLINICIAN: AUSTEN BONNER     TECHNIQUE: Four views of each knee are completed with 3 of the images bilaterally performed in the erect position.     FINDINGS:  On the LEFT, there is narrowing of the medial  femorotibial compartment as well as the patellofemoral compartment with mild osteophytosis of the patellar articular surface and a tiny spur projecting from medial tibial plateau. There is minimal suprapatellar effusion.     On the RIGHT, there are surgical clips along the posteromedial aspect of the distal portion of the right upper leg and right knee. The medial compartment is narrowed with small osteophytes projecting from the medial tibial plateau. Small spurs also arise from the patellar articular surface. The patellofemoral compartment is also narrowed.     IMPRESSION BILATERAL KNEE XR:  There is narrowing of the patellofemoral and medial compartments of each knee with the medial compartmental narrowing more pronounced on the right than on the left. There is mild osteophytosis of each knee observed as well.     Dictation workstation: [id number]  Original Interpreting Physician: ISRRAEL STOVER M.D.  Original [facility]Date[facility] Jul 7 2023 7:42A  ----------------------------------------------------  PROCEDURE: LOWER LEG RT NON VASC LIMITED - WUS 2647  REASON FOR EXAM: PAIN IN RIGHT KNEE  RESULT: MRN: [id number] Patient Name: KENDALL HITCHCOCK  STUDY:LOWER LEG RT NON VASC LIMITED; 6/16/2023 3:49 pm  INDICATION:PAIN IN RIGHT KNEE posteriorly.  COMPARISON:None     ACCESSION NUMBER(S):[id number]  ORDERING CLINICIAN:CHEKO WALSH     TECHNIQUE: Using a combination of gray scale ultrasound, spectral waveform analysis, compression technique, pulsed and color Doppler, the deep venous system of the right popliteal fossa was examined.                            FINDINGS:  No cystic or solid lesions are visualized in the right popliteal fossa; specifically no Baker's cyst is identified.  The popliteal vein is compressible and displays normal flow signal.  There is no evidence of deep venous thrombosis.     IMPRESSION RLE US:  Normal ultrasound of the right popliteal fossa.     Dictation workstation: [id number]  Original  Interpreting Physician: ABBY PALMA M.D.  Original Transcribed by/Date: [facility] Jun 16 2023 3:04P              Knee - ACL:  Anterior Drawer Test: Negative.   Lachman Test: Negative.   Prone Lachman Test: Negative.   Lelli Test: Negative.   Pivot Shift Test: Negative.   Crossover Test: Negative.      KNEE - FAT PAD:  Squeeze Test Knee Bent going into extension: Negative.      KNEE - IT BAND:  Belkys Test:  Positive: Right greater than left.   Hobbs Test: Positive: Right greater than left.   All findings remain the same since last visit with no relief.      KNEE - MCL / LCL:  Valgus Stress Test:  90 degrees: Negative, 20-30 degrees: Negative.   Varus Stress Test:  90 degrees: Negative, 20-30 degrees: Negative.   Apley Distraction Test:  Positive:, Medial:, MCL:.   Thessaly Test:  Positive Medial meniscus.   All findings remain the same since last visit with no relief.      KNEE - MENISCUS:  Apley Compression Test:  Positive, Medial MCL.   Duck Walk Test:  Negative.   Stienmann 1 Test:  Negative.   Stienmann 2 Test:  Negative.   Porfirio Test:  Positive: BILATERAL lateral meniscus .   Bragard's Test:  Negative.   Bounce Home Test:  Negative.   Payr Test:  Negative.   Thessaly Test:  Positive:, Medial meniscus.   Yoradn's Test:  Negative.   All findings remain the same since last visit with no relief.      KNEE - PATELLA:  Apprehension Test:  Negative.   Glide Test:  Negative.   Facet Tenderness Test:  Negative.   Medial Patellar Plica Test:  Negative.   Grind Test:  Positive:, Relieved with lateral pressure on patella.   Patella Tracking Test:  Negative.   Medial/Lateral Subluxation Test:  Negative.   Suppression Test:  Negative.   All findings remain the same since last visit with no relief.      Knee - PCL/POSTERIOR LATERAL CORNER:  Posterior Drawer Test: Negative.   Jimenes 90/90 (Sag Sign) Test: Negative.   Dial Test: Negative.   Reverse Lachman Test: Negative.   Quad Activation Test: Negative.   ER / Recurvatum  Test: Negative.   Reverse Pivot Shift Test: Negative.      KNEE - POPLITEUS:  Jean Pierre's Test: Negative.      KNEE - QUADRICEPS:  Dreyer Test: Negative.   VMO Test: Negative.   VLO Test: Negative.      Hip/Pelvis - Sacrum:  Leg Length Supine:  Negative, Verified with standing erect pelvis X-ray  Leg Length Supine to Seated (Derbolowsky Sign):  Negative, Verified with standing erect pelvis X-ray   Standing Flexion Test:  Negative.   Seated Flexion Test:  Negative   Spring Test:  Negative   Sacral Somatic Dysfunction:  Negative   Sacroiliac (SI) Joint Fixation Test:  Negative   Hip Flexor Tightness:  Negative   Hamstring Tightness:  Positive.   All findings remain the same since last visit with no relief.      General (LOWER LEG/ANKLE/FEET):  Location: Right Achilles compensatory for knee.   Erythema: Negative.   Edema:  Positive, Diffusely, Posterior.   Effusion: Negative.   Warmth: Negative.   Ecchymosis/Bruising: Negative.   Percussion Test:  Positive, Calcaneus, Insertion of the Achilles Tendon.   Tuning Fork Test: Negative.   Abrasions: Negative.   Orientation: Positive Asymmetrical, because of swelling.   ROM: FROM, however pain with dorsiflexion.   All findings remain the same since last visit with no relief.      Muscle Strength:    Positive:,  +3-+4/+5Planar Flexion Causes pain  +3-+4/+5 Dorsi Flexion Causes pain  +5/+5 Inversion  +5/+5 Eversion  +5/+5 Plantarflexion in combination with inversion  +5/+5 Plantarflexion in combination with eversion  +5/+5 Dorsiflexion in combination with inversion (Posterior Tibialis)  +5/+5 Dorsiflexion in combination with eversion (Peroneal Brevis)  +5/+5 Dorsiflexion in combination with eversion and flexion of great toe (Peroneal Longus).    All findings remain the same since last visit with no relief.         DTR/Neurological:   Sensation Intact, 2 Point Discrimination Test: Negative  +2/+4 Achilles Tendon Reflex (S1).            Sensation/Neurological:   Sensation Intact, 2  Point Discrimination Test: Negative  L3: Low back and front of upper leg/thigh  L4: Low back, front of upper leg/thigh, front of lower leg/calf, front of medial area of knee, and inside of ankle  L5: Low back, front and lateral knee, front and outside of lower leg/calf, top and bottom of foot, and first four toes especially big toe  S1: low back, back of upper leg/thigh, back and inside of lower leg, calf and little toe  S2: Buttocks, genitals, back of upper leg/thigh, and calves  S3: Buttocks and genitals.            Palpation:  Positive, Tender to Palpation in the following areas: achilles tendon, insertion of the achilles tendon, origin of the achilles tendon, and calcaneus.   All findings remain the same since last visit with no relief.         Vascular:   +2/+4 Dorsalis Pedis, +2/+4 Posterior Tibialis, Capillary Refill < 2 Seconds.      BILATERAL Valgus Foot Deformity.     Leg/Ankle/Foot - Ankle Impingement:  Posterior Ankle Impingement Test: Negative.   Anterior Ankle Impingement Test: Negative.      Leg/Ankle/Foot - Ankle Instability:  Flexibility Test:  Positive, decreased.   Anterior Drawer Test:  Negative.   Talar Tilt Test:  Negative.   External Rotation Kleiger Plantar Flexion Test:  Negative.   External Rotation Kleiger Dorsiflexion Test:  Negative.   Squeeze Test:  Positive at distal achilles and calcaneus.   Dorsiflexion Test:  Positive.   Posterior Tibial or Peroneal Tendon Instability Test:  Negative.   Horizontal Squeeze Test:  Positive, at achilles and calcaneus.   Vertical Squeeze Test:  Positive, at achilles and calcaneus.   Plantarflexion:  Positive.   Standing/Walking Test:  Positive, Toe Standing, Heel Standing, Toe Walking, Heel Walking, Painful.   Proprioception Test:  Positive.   Hop Test:  Positive.   All findings remain the same since last visit with no relief.      Leg/Ankle/Foot - DVT:  Kim's Sign: Negative.      Leg/Ankle/Foot - Forefoot:  Strunsky Test:  Negative.   Lakshmi  Maneuver Test:  Negative.   Metatarsal Tap Test:  Negative.   Crepitation Test:  Negative.      Leg/Ankle/Foot - Hindfoot Achilles/Calcaneus:  Zhu Compression Test:  Negative.   Hoffa Sign:  Negative.   Achilles Tendon Tap Test:  Positive.   Heel Compression Test:  Negative.   Heel Thump Test:  Positive.   All findings remain the same since last visit with no relief.      Leg/Ankle/Foot - Nerve Irritation:  Eloise Sign:  Negative.   Digital Nerve Stretch Test:  Negative.   Tinel Sign:  Positive at insertion of achilles into calcaneus.   Tourniquet Sign:  Negative.   All findings remain the same since last visit with no relief.    Assessment   1. Acute pain of left knee        2. Acute medial meniscal injury of left knee, subsequent encounter        3. Patellofemoral disorders, left knee        4. Iliotibial band syndrome of left side        5. Patellar tendinitis, left knee        6. Primary osteoarthritis of both knees      narrowing of the patellofemoral and medial compartments of each knee with the medial compartmental narrowing more pronounced on the right than on the left.      7. Acute pain of right knee        8. Acute medial meniscal injury of right knee, subsequent encounter        9. Patellofemoral disorders, right knee        10. Iliotibial band syndrome of right side        11. Patellar tendinitis, right knee        12. Hamstring tightness        13. Right ankle pain, unspecified chronicity        14. Pain of lower leg, unspecified laterality        15. Achilles tendinitis of right lower extremity        16. Strain of Achilles tendon, unspecified laterality, subsequent encounter        17. Leg length discrepancy        18. Valgus deformity of both feet            Procedure   Time Out (5 Minutes): Yes  Patient Name: Ned Ernandez  YOB: 1980  Procedure:  Corticosteroid injection  Needed Supplies Available: Correct Supplies  Laterality: BILATERAL Knees  Site Verified and Marked: Correct  Site(s) Marked  Timeout Performed by Provider: Dr. Jair Woodruff D.O.  Staff Initials: DARYA HOUSTON Inj/Asp: bilateral knee on 11/28/2023 8:42 AM  Indications: pain  Details: 22 G needle, ultrasound-guided  Medications (Right): 2 mL betamethasone acet,sod phos 6 mg/mL; 3 mL lidocaine 10 mg/mL (1 %); 5 mg dexAMETHasone 10 mg/mL; 3 mL bupivacaine PF 0.25 % (2.5 mg/mL)  Medications (Left): 2 mL betamethasone acet,sod phos 6 mg/mL; 3 mL lidocaine 10 mg/mL (1 %); 5 mg dexAMETHasone 10 mg/mL; 3 mL bupivacaine PF 0.25 % (2.5 mg/mL)  Outcome: tolerated well, no immediate complications  Procedure, treatment alternatives, risks and benefits explained, specific risks discussed. Consent was given by the patient. Immediately prior to procedure a time out was called to verify the correct patient, procedure, equipment, support staff and site/side marked as required.         Patient is informed and consent has been signed by the patient if over 18 years old., Patient parent and/or legal guardian is informed and consent has been signed., Patient and/or parent and/or legal guardian accept all risks, benefits, and possible complications associated with procedure(s) and/or manipulation(s) and/or injection(s)., Patient and/or parent and/or legal guardian deny patient allergy or known complications with any of the medications, instruments, products, and/or techniques used., All questions and concerns were answered and/or addressed with the patient and/or parent and/or legal guardian., The patient and/or parent and/or legal guardian agree to proceed with the procedure(s) and/or manipulation(s) and/or injection(s)., Prep: The area was cleansed with a mixture of equal parts rubbing alcohol 70% and Hibclens., Utilizing clean technique, the injection site(s) were marked with a skin marker., and Injection site(s) were anesthestized with topical analgesic spray prior to injection.    Performed corticosteroid injection into the patients BILATERAL  Knees, under ultrasound.  Corticosteroid mixture of 3cc 2% Lidocaine, 3cc 2% Bupivacaine, 2cc Celestone, 1cc Dexamethasone      Band-aid and/or compressive bandage was placed over the injection site(s). After the injection(s) performed osteopathic manipulation therapy to the affected area(s) to help increase blood flow to aid with the healing process as well as circulation of the medication. The patient tolerated the procedure well without any complications. The patient was instructed to gently massage the treatment site(s) as well as to apply moist heat to the affected area(s). Additionally, the patient was instructed to contact the office immediately with any complications or concerns.    The Scribe, Barbara and Nurse,  Latia, were present in the room during the entire procedure.    The following discharge instructions were reviewed in detail with the patient to the level of their understanding:    PAIN:  A mild to moderate amount of discomfort, tenderness, stiffness, and achiness-caused by the area injected can be expected for a few days after the injection.     SKIN: Due to the numbing spray used, you may develop a red, itchy, scaly rash in the area that was sprayed. Should your skin become itchy, wash area with mild soap and warm water. If needed, you may apply topical, over-the-counter Hydrocortisone cream to alleviate any itchiness. AVOID scratching due to risk of infection.    BATHING/SWIMMING: You may shower after your procedure with regular soap and water, but no baths, no swimming, and no hot tubs for 3-4 days after the procedure due to risk of infection.    ACTIVITIES:  Immediately following the injection, you may resume daily activities (such as work) and light exercise. We suggest that you refrain from strenuous activity and heavy lifting, particularly the injured body part, for a week post-procedure, but sometimes this can change as well.    MOIST HEAT/ICE:  Moist heat or ice may be used on the  injection area.     MEDICATION: You may continue your prescribed medications, over the counter medications or supplements, Aspirin, and/or any anti-inflammatories (Motrin, Advil, Aleve, Ibuprofen, Naproxen etc.). Tylenol Extra Strength, Tylenol Arthritis, or any prescribed narcotics or muscle relaxants are OK to take also.    APPOINTMENTS: You should have a follow-up appointment with Dr. Woodruff scheduled 1-3 weeks as recommended after your procedure. Please schedule your follow-up appointment on your way out after your procedure, or call the office to schedule these appointments as soon as possible.    PRECAUTIONS: Early, rare post procedure problems that can be concerning are as follows: an increase in pain not relieved by medication (over the counter or prescription), a temperature above 101 degrees, excessive bleeding or progressive, extreme swelling, or numbness.     CALL THE OFFICE OR GO TO THE EMERGENCY ROOM IF ANY OF THESE SYMPTOMS OCCUR.   If you have any questions or problems, please call our office at [phone]    Treatment or Intervention:  May continue to alternate ice and moist heat as needed  Once again, reviewed degenerative joint disease of the knees, patellofemoral tracking syndrome, and/or patellar tendinitis as well as meniscus injury in detail with the patient to the level of their understanding; a copy of this handout was provided to the patient at the time of this office visit.  Continue Physical Therapy exercises daily   once again, reviewed home exercises to be performed by the patient routinely  Once again stressed the importance of wearing shoes with good stability control to help with the biomechanics affecting the knees and lower extremities  Once again, stressed the importance of wearing full foot insoles to help with the biomechanics affecting the knees and lower extremities  Once again, recommendation over-the-counter vitamin-D 2 -3000+ milligrams a day, as well as a daily  multivitamin.  Continue to take over-the-counter curcumin, turmeric, boswellia, as well as egg shell membrane as directed to aid with joint inflammation.  Continue to take over-the-counter Move Free for joint health.  Once again patient advised regarding the risks and/or potential adverse reactions and/or side effects of any prescribed medications along with any over-the-counter medications or any supplements used. Patient advised to seek immediate medical care if any adverse reactions occur. The patient and/or patient(s) parent(s) verbalized their understanding  Reviewed BILATERAL knee MRI in detail with the patient and/or patients parent/legal guardian to their level of understanding; a copy of these results were provided to the patient and/or patients parent/legal guardian at the time of this office visit. Discussed treatment options with the patient and/or patients parent/legal guardian in detail to the level of their understanding. The patient and/or patients parent/legal guardian verbalized their understanding and agreement to the treatment plan at the time of this office visit.   Discussed in detail with the patient to the level of their understanding the possibility in the future of regenerative injections versus corticosteroid injections versus viscosupplementation injections versus a combination  Once again, talked in detail about exercise modifications and how to avoid repetitive overuse his knees.  Once again, talked about different diets as well as intermittent fasting  Once again, possibility in the future will have patient get 4 millimeter heel lift placed into his left shoe to accommodate leg-length discrepancy found on standing erect pelvis x-ray  Performed corticosteroid injection in BILATERAL knees under ultrasound.   The patient tolerated the procedure well and without any adverse effects. Discharge instructions for regenerative/viscosupplementation/corticosteroid injections were reviewed in  detail with the patient to the level of their understanding; a copy of these instructions were provided to the patient at the time of this office visit.   Return in two weeks for injection of Euflexxa #1 or sooner as needed.     Diagnostic studies:  No additional imaging or laboratory studies are needed at this time.    Activity Instructions, Restrictions, and Accommodations:  No activity limitations or modifications are needed at this time.    Consultations/Referrals:  None at this time.    Follow-up 2 weeks for Euflexxa #1 of the patients BILATERAL Knees or sooner as needed. Barbara REYNA, attest this documentation has been prepared under the direction and in the presence of Domi Woodruff D.O. By signing below, IAusten D.O, personally performed the services described in this documentation. All medical record entries made by the scribe were at my direction and in my presence. I have reviewed the chart and agree that the record reflects my personal performance and is accurate and complete. Please note that this report has been partially produced using speech recognition software. It may contain errors related to grammar, punctuation or spelling. Electronically signed, but not reviewed. Austen Woodruff D.O. Director of Sports Medicine.     AUSTEN WOODRUFF on 11/28/23 at 10:26 AM.     RIO Lamb DO    Injected bilateral knees with corticosteroid under ultrasound

## 2023-11-28 ENCOUNTER — OFFICE VISIT (OUTPATIENT)
Dept: SPORTS MEDICINE | Facility: CLINIC | Age: 43
End: 2023-11-28
Payer: COMMERCIAL

## 2023-11-28 VITALS
WEIGHT: 315 LBS | HEART RATE: 74 BPM | DIASTOLIC BLOOD PRESSURE: 64 MMHG | SYSTOLIC BLOOD PRESSURE: 134 MMHG | BODY MASS INDEX: 47.74 KG/M2 | HEIGHT: 68 IN

## 2023-11-28 DIAGNOSIS — Q66.6 VALGUS DEFORMITY OF BOTH FEET: ICD-10-CM

## 2023-11-28 DIAGNOSIS — S86.019D: ICD-10-CM

## 2023-11-28 DIAGNOSIS — M76.61 ACHILLES TENDINITIS OF RIGHT LOWER EXTREMITY: ICD-10-CM

## 2023-11-28 DIAGNOSIS — M76.31 ILIOTIBIAL BAND SYNDROME OF RIGHT SIDE: ICD-10-CM

## 2023-11-28 DIAGNOSIS — M21.70 LEG LENGTH DISCREPANCY: ICD-10-CM

## 2023-11-28 DIAGNOSIS — M76.52 PATELLAR TENDINITIS, LEFT KNEE: ICD-10-CM

## 2023-11-28 DIAGNOSIS — M17.0 PRIMARY OSTEOARTHRITIS OF BOTH KNEES: ICD-10-CM

## 2023-11-28 DIAGNOSIS — M22.2X2 PATELLOFEMORAL DISORDERS, LEFT KNEE: ICD-10-CM

## 2023-11-28 DIAGNOSIS — M25.561 ACUTE PAIN OF RIGHT KNEE: ICD-10-CM

## 2023-11-28 DIAGNOSIS — M76.51 PATELLAR TENDINITIS, RIGHT KNEE: ICD-10-CM

## 2023-11-28 DIAGNOSIS — M79.669 PAIN OF LOWER LEG, UNSPECIFIED LATERALITY: ICD-10-CM

## 2023-11-28 DIAGNOSIS — M62.89 HAMSTRING TIGHTNESS: ICD-10-CM

## 2023-11-28 DIAGNOSIS — M25.571 RIGHT ANKLE PAIN, UNSPECIFIED CHRONICITY: ICD-10-CM

## 2023-11-28 DIAGNOSIS — M22.2X1 PATELLOFEMORAL DISORDERS, RIGHT KNEE: ICD-10-CM

## 2023-11-28 DIAGNOSIS — M25.562 ACUTE PAIN OF LEFT KNEE: ICD-10-CM

## 2023-11-28 DIAGNOSIS — S83.8X2D ACUTE MEDIAL MENISCAL INJURY OF LEFT KNEE, SUBSEQUENT ENCOUNTER: ICD-10-CM

## 2023-11-28 DIAGNOSIS — S83.8X1D ACUTE MEDIAL MENISCAL INJURY OF RIGHT KNEE, SUBSEQUENT ENCOUNTER: ICD-10-CM

## 2023-11-28 DIAGNOSIS — M76.32 ILIOTIBIAL BAND SYNDROME OF LEFT SIDE: ICD-10-CM

## 2023-11-28 PROCEDURE — 1036F TOBACCO NON-USER: CPT | Performed by: FAMILY MEDICINE

## 2023-11-28 PROCEDURE — 3078F DIAST BP <80 MM HG: CPT | Performed by: FAMILY MEDICINE

## 2023-11-28 PROCEDURE — 99214 OFFICE O/P EST MOD 30 MIN: CPT | Mod: 50 | Performed by: FAMILY MEDICINE

## 2023-11-28 PROCEDURE — 2500000005 HC RX 250 GENERAL PHARMACY W/O HCPCS: Performed by: FAMILY MEDICINE

## 2023-11-28 PROCEDURE — 3075F SYST BP GE 130 - 139MM HG: CPT | Performed by: FAMILY MEDICINE

## 2023-11-28 PROCEDURE — 99214 OFFICE O/P EST MOD 30 MIN: CPT | Performed by: FAMILY MEDICINE

## 2023-11-28 PROCEDURE — 20611 DRAIN/INJ JOINT/BURSA W/US: CPT | Performed by: FAMILY MEDICINE

## 2023-11-28 PROCEDURE — 3062F POS MACROALBUMINURIA REV: CPT | Performed by: FAMILY MEDICINE

## 2023-11-28 PROCEDURE — 2500000004 HC RX 250 GENERAL PHARMACY W/ HCPCS (ALT 636 FOR OP/ED): Performed by: FAMILY MEDICINE

## 2023-11-28 PROCEDURE — 4010F ACE/ARB THERAPY RXD/TAKEN: CPT | Performed by: FAMILY MEDICINE

## 2023-11-28 PROCEDURE — 3048F LDL-C <100 MG/DL: CPT | Performed by: FAMILY MEDICINE

## 2023-11-28 RX ORDER — BUPIVACAINE HYDROCHLORIDE 2.5 MG/ML
3 INJECTION, SOLUTION EPIDURAL; INFILTRATION; INTRACAUDAL
Status: COMPLETED | OUTPATIENT
Start: 2023-11-28 | End: 2023-11-28

## 2023-11-28 RX ORDER — LIDOCAINE HYDROCHLORIDE 10 MG/ML
3 INJECTION INFILTRATION; PERINEURAL
Status: COMPLETED | OUTPATIENT
Start: 2023-11-28 | End: 2023-11-28

## 2023-11-28 RX ORDER — DEXAMETHASONE SODIUM PHOSPHATE 100 MG/10ML
5 INJECTION INTRAMUSCULAR; INTRAVENOUS
Status: COMPLETED | OUTPATIENT
Start: 2023-11-28 | End: 2023-11-28

## 2023-11-28 RX ORDER — BETAMETHASONE SODIUM PHOSPHATE AND BETAMETHASONE ACETATE 3; 3 MG/ML; MG/ML
2 INJECTION, SUSPENSION INTRA-ARTICULAR; INTRALESIONAL; INTRAMUSCULAR; SOFT TISSUE
Status: COMPLETED | OUTPATIENT
Start: 2023-11-28 | End: 2023-11-28

## 2023-11-28 RX ADMIN — DEXAMETHASONE SODIUM PHOSPHATE 5 MG: 10 INJECTION, SOLUTION INTRAMUSCULAR; INTRAVENOUS at 08:42

## 2023-11-28 RX ADMIN — BUPIVACAINE HYDROCHLORIDE 3 ML: 2.5 INJECTION, SOLUTION EPIDURAL; INFILTRATION; INTRACAUDAL; PERINEURAL at 08:42

## 2023-11-28 RX ADMIN — LIDOCAINE HYDROCHLORIDE 3 ML: 10 INJECTION, SOLUTION INFILTRATION; PERINEURAL at 08:42

## 2023-11-28 RX ADMIN — BETAMETHASONE ACETATE AND BETAMETHASONE SODIUM PHOSPHATE 2 ML: 3; 3 INJECTION, SUSPENSION INTRA-ARTICULAR; INTRALESIONAL; INTRAMUSCULAR; SOFT TISSUE at 08:42

## 2023-11-28 ASSESSMENT — PAIN DESCRIPTION - DESCRIPTORS: DESCRIPTORS: ACHING;DULL

## 2023-11-28 ASSESSMENT — PAIN - FUNCTIONAL ASSESSMENT: PAIN_FUNCTIONAL_ASSESSMENT: 0-10

## 2023-11-28 ASSESSMENT — PAIN SCALES - GENERAL: PAINLEVEL_OUTOF10: 2

## 2023-12-04 NOTE — TELEPHONE ENCOUNTER
Patient left VM stating that after receiving cortisone injection into his Left knee on 11/28/23 his blood sugars have been very high. He has had to adjust his insulin and they are still around 300. He is monitoring his blood sugar frequently and will notify PCP if they do not start to come down.     He is requesting that we document this issue as his insurance denied Zilretta when requested instead of cortisone and he is hoping that documenting his elevated blood sugars will help with approval for Zilretta should he require future corticosteroid injections into his LEFT Knee.     I called patient and left a VM for patient advising that we did receive his message and asked him to call back if he is still experiencing any issues related to his cortisone injection.

## 2023-12-05 NOTE — TELEPHONE ENCOUNTER
Please make sure we put a note linked into his last visit even if it is an addendum documenting the extremely elevated blood sugars with cortisone injection.  Also will definitely need to use Zilretta in future

## 2023-12-08 NOTE — PROGRESS NOTES
"Verbal consent of the patient and/or verbal parental consent for patients under the age of 18 have been obtained to conduct a physical examination at this office visit.    Established patient  History Of Present Illness  12/11/23 Ned Ernandez is a 43 y.o. male who presents for Euflexxa Injection 1/3 injection into the patients BILATERAL knees. Patient reports resolution of BILATERAL knee pain until yesterday when he reports placing a stress on the outside of his RIGHT knee stepping out of a car. Patient notes 1/10 pain in the lateral aspect of his RIGHT knee that was not significant enough to take NSAIDs to resolve, but is still present this morning. Patient received corticosteroid injection to bilateral knees two weeks ago. He was denied Zilretta injections by his insurance despite being diabetic. He reports that his pain corticosteroid injection improved significantly after the injections; however, he battled high blood glucose levels. He reports that he needed to take 3-4 times the amount of normal insulin to keep his sugars between 350-400. He states that this lasted 4 days before his sugar levels returned to normal just above 100.     Last Recorded Vitals  /82 (BP Location: Right arm, Patient Position: Sitting, BP Cuff Size: Large adult)   Pulse 69   Ht 1.727 m (5' 8\")   Wt (!) 181 kg (400 lb)   BMI 60.82 kg/m²      All previous Progress Notes and imaging results related to this patients chief complaint have been reviewed in preparation for this examination.    Past Medical History  He has a past medical history of Diabetes mellitus type 1 (CMS/[facility]), MI (myocardial infarction) (CMS/[facility]), and Osteoarthritis (10/2023).    Surgical History  He has a past surgical history that includes Cardiac catheterization (11/2014); Arterial bypass surgry (12/2014); Coronary stent placement (08/31/2020); IR ablation vein (11/2022); and Spider vein injection (Right, 11/2022).     Social History  He reports that he " has never smoked. He has never used smokeless tobacco. He reports that he does not currently use alcohol. He reports that he does not use drugs.    Family History  Family History   Problem Relation Name Age of Onset    No Known Problems Mother      No Known Problems Father      No Known Problems Sister      Diabetes Maternal Grandfather      Cancer Paternal Grandmother      Diabetes Paternal Grandmother      Heart disease Paternal Grandfather          Allergies  Patient has no known allergies.    Historical Clinical Intake  July 7, 2023 Verbal consent of the patient and/or verbal parental consent for patients under the age of 18 have been obtained to conduct a physical examination at this office visit. Ned is a 42 year old male who was graciously referred into the office by Dr. López who present for an initial evaluation of his BILATERAL knee pain R>L. He reports a history of RIGHT knee pain with an injury occurring at 18 years old while playing racket ball. He describes his RIGHT knee pain as a throbbing ache proximal to the posterolateral aspect of his patella that will radiate into his right Achilles tendon. He states his LEFT knee pain has gotten significantly worse since his compensating for his right knee occurs along the medial aspect which he also describes as a throbbing ache. Ned has positive crepitus with reported popping with ambulation. He denies any numbness, tingling or instability. He rates his pain at 6/10 today. He takes OTC Aleve alternating with OTC Tylenol and elevates his BLE for pain management with minimal to no relief. His PCP, Dr. López, ordered an US of his RLE which was negative for DVT prior to this examination. I stressed to him the importance based off his medical history of having open heart surgery already that he should not be taking any non steroid anti-inflammatory drugs whatsoever he should only be taking Tylenol. We talked in detail about extra supplements that he could take. We  also talked in detail about a significant amount of weight gain that he needs to start being very attentive to is very active and we talked about modifications and exercises that he can do regularly.  ------------------------  September 20, 2023 Verbal consent of the patient and/or verbal parental consent for patients under the age of 18 have been obtained to conduct a physical examination at this office visit. Ned is a newly established patient who is present here today for a reevaluation of his BILATERAL knees. Ned states that he has not noticed any significant change in symptoms; and he continues to c/o bony pain along the posterior medial aspect of both knees He is taking Curcumin/Tumeric and OTC Move Free as directed previously and is also taking Tylenol Arthritis with no change in symptoms. He is unable to take NSAIDS due to history of heart surgery. He has completed 6-8 weeks of physical therapy with no significant improvement in knee pain and is continuing therapy and performing his home exercise program. He was referred back to our office for reevaluation as he was not improving. He states that his RIGHT knee hurts more than his LEFT. Pain is currently 4/10 in right posterior lateral knee and 1/10 on the LEFT knee. Pain is worse with climbing stairs, squatting, and prolonged walking. He was referred back to office by PT because not getting any better and actuaaly getting worse.  ------------------------------------------------------  10/30/23Ned STEF Ernandez is a 43 y.o. male who presents to review his BILATERAL knee MRI which was performed at [facility]. Pt continues to have bilateral knee pain R >L. Pt reports intermittent pain on lateral and posterior knees. Worse with standing for long periods of time and with exertion. Currently treating pain with Tylenol Arthritis, Move Free, and turmeric-curcumin supplements and ice. Pt has been wearing good supportive shoes and inserts and wears compression  stockings. Pt completed physical therapy and is continuing with HEP. Pt feels pain has lessened in frequency since starting supplements and doing PT. Walking up and down stairs causes pain as he has to go one foot at a time. He is an  and sits typically all day.  He would like to start injections as soon as possible.  He says that since he is plateaued so much during physical therapy and it has not helped much she is hoping that the injections will help.  I told them are going to have to get approval for Zilretta injections since he is diabetic for his right knee for his distal IT band syndrome friction syndrome.  Also had to get approval for viscosupplementation injections as well.  We also discussed he has to talk to his cardiologist who prescribed his blood thinner to see what they want us to do for stopping before injections and keeping him off of it after injections.   -------------------------------  11/28/23 Ned Ernandez is a 43 y.o. male who presents for a corticosteroid injection into the patients BILATERAL knees. Patient continues to experience pain in both knees around all sides but the lateral and posterior side are worse. He states he constantly feels the bones of both knees popping. Pain is worse when moving from standing to sitting position and ascending stairs. Pain is also worse without shoes. He treats the pain with Tylenol when needed. He continues to wear good supportive footwear with inserts with his 4mm heel lift in LEFT shoe. He completed physical therapy but continues to participate in HEP. He also continues to take Move Free an turmeric supplements. Patient discussed stopping his Xarelto and aspirin with cardiologist, Dr. Alexis during the course of the injections.   I told him we try to get approval for the Zilretta injections however his insurance did not allow it so he had a corticosteroid injection and they wanted to see how that affected his blood sugars.  I told him to  monitor his blood sugars regularly and he may have to use some insulin.      General Examination:  GENERAL APPEARANCE: appears well, pleasant, and cooperative, NAD.   HEENT: normal, unremarkable.   NECK: supple.   HEART: RRR, normal S1S2.   LUNGS: clear to auscultation bilaterally.   ABDOMEN: soft, NT/ND, BS present.   EXTREMITIES: no cyanosis, clubbing.   SKIN: no rash or cellulitis.   NEUROLOGIC EXAM: awake, A&O x 3, CN's II-XII grossly intact.   PSYCH: good eye contact, appropriate mood and affect .            General (KNEE):  Location:  BILATERAL RIGHT laterally more so than LEFT   Erythema: Negative.   Edema:  Positive: BILATERAL .   Effusion: Negative.   Warmth: Negative.   Percussion Test: Positive: BILATERAL Posterior Lateral Tibial Plateau , Distal Lateral Femoral Condyle.   Tuning Fork Test: Positive: BILATERAL Posterior Lateral Tibial Plateau , Distal Lateral Femoral Condyle.   Ecchymosis/Bruising: Negative.   Abrasions: Negative.   Palpation: Positive: BILATERAL Tenderness to Palpation over the medial joint line as well as around the medial lateral patellar retinaculum, distal IT bands right more than left, Posterior Lateral Tibial Plateau , Distal Lateral Femoral Condyle.   Baker's Cyst: Negative.   Orientation: Symmetrical.   Range of Motion: FROM, Not Painful  Knee Flexion ( 0-135 degrees)  Knee Extension ( 0-15 degrees)  Hip Flexion (120-130 degrees)  Hip Extension (10-20 degrees)  Hip ABduction towards body (45 degrees)  Hip ADduction away from body (30 degrees)  Internal Rotation (45 degrees)          External Rotation (50 degrees).   All findings remain the same since last visit with no relief.      Muscle Strength:   +5/+5 Quadricep Extension  +5/+5 Hamstring Flexion  +5+5 Hip Flexion  +5+5 Hip Extension  +5/+5 Hip ABduction away from body  +5/+5 Hip ADduction towards body  +5/+5 Hip Internal Rotation  +5/+5 Hip External Rotation  +5+5 IT-Band  +5+5 Gastrocnemius  +5+5 Soleus.       DTR/Neurological:   +2/+4 Patellar (L4)  +2/+4 Posterior Tibialis Reflex and Medial Hamstrings (L5)  +2/+4 Achilles (S1).            Sensation/Neurological Lumbar:    Negative, Sensation intact:  L1: Low back, hips, and groin  L2: Low back and front of inside of upper leg/thigh  L3: Low back and front of upper leg/thigh  L4: Low back, front of upper leg/thigh, front of lower leg/calf, front of medial area of knee, and inside of ankle  L5: Low back, front and lateral knee, front and outside of lower leg/calf, top and bottom of foot and first four toes especially big toe.      Sensation/Neurological Sacrum:   Negative, Sensation Intact, 2 -Point Discrimination Test: Negative  S1: low back, back of upper leg/thigh, back and inside of lower leg, calf and little toe  S2: Buttocks, genitals, back of upper leg/thigh and calves  S3: Buttocks and genitals  S4: Buttocks  S5: Buttocks.   Pain with Squat:  Positive:.   Pain with Sumo Squat:  Positive:.   Vascular: +2/+4 Femoral, +2/+4 Dorsalis Pedis, +2/+4 Posterior Tibial  Capillary Refill less than 2 seconds.   Feet/Foot: Positive Valgus Foot Deformity (Pes Planus) BILATERAL.   Leg length discrepancy:  Positive: Noted on physical examination standing erect pelvis x-ray shows minimal leg-length discrepancy.   All findings remain the same since last visit with no relief.       Diagnostic studies:  Standing erect pelvis x-ray shows the following:  Left leg shorter than right leg at the iliac crest by 6 millimeters mm  Right leg shorter than left leg at Sacral Base by 6 millimeters mm  Left leg shorter than right leg at midline Femoral Heads by 4 mm  Overall difference left leg shorter than right leg by approximately 4 millimeters                    PROCEDURE: PELVIS 1 OR 2 VIEW - TXR 0039  REASON FOR EXAM: LEG LENGTH DISCREPANCY  RESULT: MRN: [id number] Patient Name: KENDALL HITCHCOCK  STUDY: PELVIS 1 OR 2 VIEW 7/7/2023 8:15 am  INDICATION: LEG LENGTH DISCREPANCY  COMPARISON: None  available.     ACCESSION NUMBER(S): [id number]  ORDERING CLINICIAN: AUSTEN BONNER     TECHNIQUE: Erect AP view of the pelvis     FINDINGS:  Normal mineralization. The hip joint space is maintained bilaterally. No fracture, dislocation, or bony abnormality is seen. The pelvic ring is intact without fracture or disruption.     IMPRESSION PELVIS XR:  Normal erect AP view of the pelvis     Dictation workstation: [id number]  Original Interpreting Physician: ISRRAEL STOVER M.D.  Original Transcribed by/Date: MMODAL Jul 7 2023 7:42A  ----------------------------------------------------  PROCEDURE: KNEE BI MIN 4 VIEW - TXR 0220  REASON FOR EXAM: BILAT KNEE PAIN  RESULT: MRN: [id number] Patient Name: KENDALL HITCHCOCK  STUDY: KNEE BI MIN 4 VIEW 7/7/2023 8:15 am  INDICATION: BILAT KNEE PAIN  COMPARISON: None available.     ACCESSION NUMBER(S): [id number]  ORDERING CLINICIAN: AUSTEN BONNER     TECHNIQUE: Four views of each knee are completed with 3 of the images bilaterally performed in the erect position.     FINDINGS:  On the LEFT, there is narrowing of the medial femorotibial compartment as well as the patellofemoral compartment with mild osteophytosis of the patellar articular surface and a tiny spur projecting from medial tibial plateau. There is minimal suprapatellar effusion.     On the RIGHT, there are surgical clips along the posteromedial aspect of the distal portion of the right upper leg and right knee. The medial compartment is narrowed with small osteophytes projecting from the medial tibial plateau. Small spurs also arise from the patellar articular surface. The patellofemoral compartment is also narrowed.     IMPRESSION BILATERAL KNEE XR:  There is narrowing of the patellofemoral and medial compartments of each knee with the medial compartmental narrowing more pronounced on the right than on the left. There is mild osteophytosis of each knee observed as well.     Dictation workstation: [id number]  Original Interpreting  Physician: ISRRAEL STOVER M.D.  Original Transcribed by/Date: MMODAL Jul 7 2023 7:42A  ----------------------------------------------------  PROCEDURE: LOWER LEG RT NON VASC LIMITED - WUS 2647  REASON FOR EXAM: PAIN IN RIGHT KNEE  RESULT: MRN: [id number] Patient Name: KENDALL HITCHCOCK  STUDY:LOWER LEG RT NON VASC LIMITED; 6/16/2023 3:49 pm  INDICATION:PAIN IN RIGHT KNEE posteriorly.  COMPARISON:None     ACCESSION NUMBER(S):[id number]  ORDERING CLINICIAN:CHEKO WALSH     TECHNIQUE: Using a combination of gray scale ultrasound, spectral waveform analysis, compression technique, pulsed and color Doppler, the deep venous system of the right popliteal fossa was examined.                            FINDINGS:  No cystic or solid lesions are visualized in the right popliteal fossa; specifically no Baker's cyst is identified.  The popliteal vein is compressible and displays normal flow signal.  There is no evidence of deep venous thrombosis.     IMPRESSION RLE US:  Normal ultrasound of the right popliteal fossa.     Dictation workstation: [id number]  Original Interpreting Physician: ABBY PALMA M.D.  Original Transcribed by/Date: MMODAL Jun 16 2023 3:04P              Knee - ACL:  Anterior Drawer Test: Negative.   Lachman Test: Negative.   Prone Lachman Test: Negative.   Lelli Test: Negative.   Pivot Shift Test: Negative.   Crossover Test: Negative.      KNEE - FAT PAD:  Squeeze Test Knee Bent going into extension: Negative.      KNEE - IT BAND:  Belkys Test:  Positive: Right greater than left.   Hobbs Test: Positive: Right greater than left.   All findings remain the same since last visit with no relief.      KNEE - MCL / LCL:  Valgus Stress Test:  90 degrees: Negative, 20-30 degrees: Negative.   Varus Stress Test:  90 degrees: Negative, 20-30 degrees: Negative.   Apley Distraction Test:  Positive:, Medial:, MCL:.   Thessaly Test:  Positive Medial meniscus.   All findings remain the same since last visit with no relief.      KNEE -  MENISCUS:  Apley Compression Test:  Positive, Medial MCL.   Duck Walk Test:  Negative.   Stienmann 1 Test:  Negative.   Stienmann 2 Test:  Negative.   Porfirio Test:  Positive: BILATERAL lateral meniscus .   Bragard's Test:  Negative.   Bounce Home Test:  Negative.   Payr Test:  Negative.   Thessaly Test:  Positive:, Medial meniscus.   Yordan's Test:  Negative.   All findings remain the same since last visit with no relief.      KNEE - PATELLA:  Apprehension Test:  Negative.   Glide Test:  Negative.   Facet Tenderness Test:  Negative.   Medial Patellar Plica Test:  Negative.   Grind Test:  Positive:, Relieved with lateral pressure on patella.   Patella Tracking Test:  Negative.   Medial/Lateral Subluxation Test:  Negative.   Suppression Test:  Negative.   All findings remain the same since last visit with no relief.      Knee - PCL/POSTERIOR LATERAL CORNER:  Posterior Drawer Test: Negative.   Jimenes 90/90 (Sag Sign) Test: Negative.   Dial Test: Negative.   Reverse Lachman Test: Negative.   Quad Activation Test: Negative.   ER / Recurvatum Test: Negative.   Reverse Pivot Shift Test: Negative.      KNEE - POPLITEUS:  Jean Pierre's Test: Negative.      KNEE - QUADRICEPS:  Dreyer Test: Negative.   VMO Test: Negative.   VLO Test: Negative.      Hip/Pelvis - Sacrum:  Leg Length Supine:  Negative, Verified with standing erect pelvis X-ray  Leg Length Supine to Seated (Derbolowsky Sign):  Negative, Verified with standing erect pelvis X-ray   Standing Flexion Test:  Negative.   Seated Flexion Test:  Negative   Spring Test:  Negative   Sacral Somatic Dysfunction:  Negative   Sacroiliac (SI) Joint Fixation Test:  Negative   Hip Flexor Tightness:  Negative   Hamstring Tightness:  Positive.   All findings remain the same since last visit with no relief.      General (LOWER LEG/ANKLE/FEET):  Location: Right Achilles compensatory for knee.   Erythema: Negative.   Edema:  Positive, Diffusely, Posterior.   Effusion: Negative.   Warmth:  Negative.   Ecchymosis/Bruising: Negative.   Percussion Test:  Positive, Calcaneus, Insertion of the Achilles Tendon.   Tuning Fork Test: Negative.   Abrasions: Negative.   Orientation: Positive Asymmetrical, because of swelling.   ROM: FROM, however pain with dorsiflexion.   All findings remain the same since last visit with no relief.      Muscle Strength:    Positive:,  +3-+4/+5Planar Flexion Causes pain  +3-+4/+5 Dorsi Flexion Causes pain  +5/+5 Inversion  +5/+5 Eversion  +5/+5 Plantarflexion in combination with inversion  +5/+5 Plantarflexion in combination with eversion  +5/+5 Dorsiflexion in combination with inversion (Posterior Tibialis)  +5/+5 Dorsiflexion in combination with eversion (Peroneal Brevis)  +5/+5 Dorsiflexion in combination with eversion and flexion of great toe (Peroneal Longus).    All findings remain the same since last visit with no relief.         DTR/Neurological:   Sensation Intact, 2 Point Discrimination Test: Negative  +2/+4 Achilles Tendon Reflex (S1).            Sensation/Neurological:   Sensation Intact, 2 Point Discrimination Test: Negative  L3: Low back and front of upper leg/thigh  L4: Low back, front of upper leg/thigh, front of lower leg/calf, front of medial area of knee, and inside of ankle  L5: Low back, front and lateral knee, front and outside of lower leg/calf, top and bottom of foot, and first four toes especially big toe  S1: low back, back of upper leg/thigh, back and inside of lower leg, calf and little toe  S2: Buttocks, genitals, back of upper leg/thigh, and calves  S3: Buttocks and genitals.            Palpation:  Positive, Tender to Palpation in the following areas: achilles tendon, insertion of the achilles tendon, origin of the achilles tendon, and calcaneus.   All findings remain the same since last visit with no relief.         Vascular:   +2/+4 Dorsalis Pedis, +2/+4 Posterior Tibialis, Capillary Refill < 2 Seconds.      BILATERAL Valgus Foot Deformity.      Leg/Ankle/Foot - Ankle Impingement:  Posterior Ankle Impingement Test: Negative.   Anterior Ankle Impingement Test: Negative.      Leg/Ankle/Foot - Ankle Instability:  Flexibility Test:  Positive, decreased.   Anterior Drawer Test:  Negative.   Talar Tilt Test:  Negative.   External Rotation Kleiger Plantar Flexion Test:  Negative.   External Rotation Kleiger Dorsiflexion Test:  Negative.   Squeeze Test:  Positive at distal achilles and calcaneus.   Dorsiflexion Test:  Positive.   Posterior Tibial or Peroneal Tendon Instability Test:  Negative.   Horizontal Squeeze Test:  Positive, at achilles and calcaneus.   Vertical Squeeze Test:  Positive, at achilles and calcaneus.   Plantarflexion:  Positive.   Standing/Walking Test:  Positive, Toe Standing, Heel Standing, Toe Walking, Heel Walking, Painful.   Proprioception Test:  Positive.   Hop Test:  Positive.   All findings remain the same since last visit with no relief.      Leg/Ankle/Foot - DVT:  Kim's Sign: Negative.      Leg/Ankle/Foot - Forefoot:  Strunsky Test:  Negative.   Gaenslen Maneuver Test:  Negative.   Metatarsal Tap Test:  Negative.   Crepitation Test:  Negative.      Leg/Ankle/Foot - Hindfoot Achilles/Calcaneus:  Zhu Compression Test:  Negative.   Hoffa Sign:  Negative.   Achilles Tendon Tap Test:  Positive.   Heel Compression Test:  Negative.   Heel Thump Test:  Positive.   All findings remain the same since last visit with no relief.      Leg/Ankle/Foot - Nerve Irritation:  Eloise Sign:  Negative.   Digital Nerve Stretch Test:  Negative.   Tinel Sign:  Positive at insertion of achilles into calcaneus.   Tourniquet Sign:  Negative.   All findings remain the same since last visit with no relief.    Assessment   1. Acute pain of left knee  L Inj/Asp: bilateral knee    Point of Care Ultrasound      2. Acute medial meniscal injury of left knee, subsequent encounter  L Inj/Asp: bilateral knee    Point of Care Ultrasound      3. Patellofemoral  disorders, left knee  L Inj/Asp: bilateral knee    Point of Care Ultrasound      4. Iliotibial band syndrome of left side  L Inj/Asp: bilateral knee    Point of Care Ultrasound      5. Patellar tendinitis, left knee  L Inj/Asp: bilateral knee    Point of Care Ultrasound      6. Primary osteoarthritis of both knees  L Inj/Asp: bilateral knee    Point of Care Ultrasound      7. Acute pain of right knee  L Inj/Asp: bilateral knee    Point of Care Ultrasound      8. Acute medial meniscal injury of right knee, subsequent encounter  L Inj/Asp: bilateral knee    Point of Care Ultrasound      9. Patellofemoral disorders, right knee  L Inj/Asp: bilateral knee    Point of Care Ultrasound      10. Iliotibial band syndrome of right side  L Inj/Asp: bilateral knee    Point of Care Ultrasound      11. Patellar tendinitis, right knee  L Inj/Asp: bilateral knee    Point of Care Ultrasound      12. Hamstring tightness  L Inj/Asp: bilateral knee    Point of Care Ultrasound      13. Leg length discrepancy  L Inj/Asp: bilateral knee    Point of Care Ultrasound      14. Valgus deformity of both feet  L Inj/Asp: bilateral knee    Point of Care Ultrasound          Procedure   Time Out (5 Minutes): Yes  Patient Name: Ned Ernandez  YOB: 1980  Procedure: Euflexxa Injection 1/3  Needed Supplies Available: Correct Supplies  Laterality: BILATERAL Knees  Site Verified and Marked: Correct Site(s) Marked  Timeout Performed by Provider: Dr. Jair Woodruff, D.O.  Staff Initials:     L Inj/Asp: bilateral knee on 12/12/2023 8:15 AM  Indications: pain  Details: 22 G needle, ultrasound-guided  Medications (Right): 20 mg sodium hyaluronate 10 mg/mL(mw 2.4 -3.6 million)  Medications (Left): 20 mg sodium hyaluronate 10 mg/mL(mw 2.4 -3.6 million)  Outcome: tolerated well, no immediate complications  Procedure, treatment alternatives, risks and benefits explained, specific risks discussed. Consent was given by the patient. Immediately  prior to procedure a time out was called to verify the correct patient, procedure, equipment, support staff and site/side marked as required.            Patient is informed and consent has been signed by the patient if over 18 years old., Patient parent and/or legal guardian is informed and consent has been signed., Patient and/or parent and/or legal guardian accept all risks, benefits, and possible complications associated with procedure(s) and/or manipulation(s) and/or injection(s)., Patient and/or parent and/or legal guardian deny patient allergy or known complications with any of the medications, instruments, products, and/or techniques used., All questions and concerns were answered and/or addressed with the patient and/or parent and/or legal guardian., The patient and/or parent and/or legal guardian agree to proceed with the procedure(s) and/or manipulation(s) and/or injection(s)., Prep: The area was cleansed with a mixture of equal parts rubbing alcohol 70% and Hibclens., Utilizing clean technique, the injection site(s) were marked with a skin marker., and Injection site(s) were anesthestized with topical analgesic spray prior to injection.    Performed regenerative Euflexxa Injection 1/3 into the patients BILATERAL Knees, under ultrasound.  Euflexxa (1%)    Band-aid and/or compressive bandage was placed over the injection site(s). After the injection(s) performed osteopathic manipulation therapy to the affected area(s) to help increase blood flow to aid with the healing process as well as circulation of the medication. The patient tolerated the procedure well without any complications. The patient was instructed to gently massage the treatment site(s) as well as to apply moist heat to the affected area(s). Additionally, the patient was instructed to contact the office immediately with any complications or concerns.    The NurseLatia was present in the room during the entire procedure.    The following  discharge instructions were reviewed in detail with the patient to the level of their understanding:    PAIN:  A mild to moderate amount of discomfort, tenderness, stiffness, and achiness-caused by the inflammation of the area can be expected for a few days after the injection. This is normal and good as the inflammation is an important part of the healing process.    SKIN: Due to the numbing spray used, you may develop a red, itchy, scaly rash in the area that was sprayed. Should your skin become itchy, wash the area with mild soap and warm water. If needed, you may apply topical over-the-counter Hydrocortisone cream to alleviate any itchiness. AVOID scratching due to risk of infection.,     BATHING/SWIMMING: You may shower after your procedure with regular soap and water, but no baths, no swimming, and no hot tubs for 3-4 days after the procedure.,     ACTIVITIES:  Immediately following the injection, you may resume daily activities (such as work) and light exercise. We suggest that you refrain from strenuous activity and heavy lifting, particularly the injured body part, for a week post-procedure, but sometimes this can change as well.    MOIST HEAT/ICE:  Moist heat may be used on the injection area. NO ICE.  MEDICATION: You may continue your prescribed medications and any over-the-counter supplements; however, it is not recommended to use aspirin or anti-inflammatories (Motrin, Advil, Aleve, Ibuprofen, Naproxen etc.) for up to 2 weeks post procedure. Tylenol Extra Strength, Tylenol Arthritis and/or any prescribed narcotics or muscle relaxants are OK to take.    APPOINTMENTS: You should have a follow-up appointment with Dr. Woodruff scheduled 1-3 weeks as recommended after your procedure for your next round of injections or to follow-up after you have completed your injection series. Please schedule your follow-up appointment on your way out after your procedure, or call the office to schedule these appointments as  soon as possible.    PRECAUTIONS: Smoking, caffeine, alcohol, sex and anti-inflammatories post-procedure may reduce the effectiveness of Prolotherapy/Neural Prolotherapy/Prolozone injections. Early, rare post procedure problems that can be concerning are as follows: an increase in pain not relieved by medication (over the counter or prescription), a temperature above 101 degrees, bleeding or progressive, extreme swelling, or numbness.     CALL THE OFFICE OR GO TO THE EMERGENCY ROOM IF ANY OF THESE SYMPTOMS OCCUR.   If you have any questions or problems, please call our office at [phone]     Treatment or Intervention:  May continue to alternate ice and moist heat as needed  Once again, reviewed degenerative joint disease of the knees, patellofemoral tracking syndrome, and/or patellar tendinitis as well as meniscus injury in detail with the patient to the level of their understanding; a copy of this handout was provided to the patient at the time of this office visit.  Continue Physical Therapy exercises daily   once again, reviewed home exercises to be performed by the patient routinely  Once again stressed the importance of wearing shoes with good stability control to help with the biomechanics affecting the knees and lower extremities  Once again, stressed the importance of wearing full foot insoles to help with the biomechanics affecting the knees and lower extremities  Once again, recommendation over-the-counter vitamin-D 2 -3000+ milligrams a day, as well as a daily multivitamin.  Continue to take over-the-counter curcumin, turmeric, boswellia, as well as egg shell membrane as directed to aid with joint inflammation.  Continue to take over-the-counter Move Free for joint health.  Once again patient advised regarding the risks and/or potential adverse reactions and/or side effects of any prescribed medications along with any over-the-counter medications or any supplements used. Patient advised to seek  immediate medical care if any adverse reactions occur. The patient and/or patient(s) parent(s) verbalized their understanding  Reviewed BILATERAL knee MRI in detail with the patient and/or patients parent/legal guardian to their level of understanding; a copy of these results were provided to the patient and/or patients parent/legal guardian at the time of this office visit. Discussed treatment options with the patient and/or patients parent/legal guardian in detail to the level of their understanding. The patient and/or patients parent/legal guardian verbalized their understanding and agreement to the treatment plan at the time of this office visit.   Discussed in detail with the patient to the level of their understanding the possibility in the future of regenerative injections versus corticosteroid injections versus viscosupplementation injections versus a combination  Once again, talked in detail about exercise modifications and how to avoid repetitive overuse his knees.  Once again, talked about different diets as well as intermittent fasting  Once again, possibility in the future will have patient get 4 millimeter heel lift placed into his left shoe to accommodate leg-length discrepancy found on standing erect pelvis x-ray  Performed Euflexxa #1 injection in BILATERAL knees under ultrasound. The patient tolerated the procedure well and without any adverse effects. Discharge instructions for viscosupplementation injections were reviewed in detail with the patient to the level of their understanding; a copy of these instructions were provided to the patient at the time of this office visit.   Follow-up next week for Euflexxa injection #2 into bilateral knees      Diagnostic studies:  No additional imaging or laboratory studies are needed at this time.    Activity Instructions, Restrictions, and Accommodations:  No activity limitations or modifications are needed at this time.    Consultations/Referrals:  None at  this time.    Follow-up next week for Euflexxa #2 injection into the patients BILATERAL knees or sooner as needed. Please note that this report has been produced using speech recognition software.  It may contain errors related to grammar, punctuation or spelling.  Electronically signed, but not reviewed.  KATE Lamb, Director of Sports Medicine     AUSTEN BONNER on 12/12/23 at 2:08 PM.     RIO Lamb DO    Injected bilateral knees under ultrasound with Euflexxa

## 2023-12-08 NOTE — PATIENT INSTRUCTIONS
The following discharge instructions were reviewed in detail with the patient to the level of their understanding:    PAIN:  A mild to moderate amount of discomfort, tenderness, stiffness, and achiness-caused by the inflammation of the area can be expected for a few days after the injection. This is normal and good as the inflammation is an important part of the healing process.    SKIN: Due to the numbing spray used, you may develop a red, itchy, scaly rash in the area that was sprayed. Should your skin become itchy, wash the area with mild soap and warm water. If needed, you may apply topical over-the-counter Hydrocortisone cream to alleviate any itchiness. AVOID scratching due to risk of infection.,     BATHING/SWIMMING: You may shower after your procedure with regular soap and water, but no baths, no swimming, and no hot tubs for 3-4 days after the procedure.,     ACTIVITIES:  Immediately following the injection, you may resume daily activities (such as work) and light exercise. We suggest that you refrain from strenuous activity and heavy lifting, particularly the injured body part, for a week post-procedure, but sometimes this can change as well.    MOIST HEAT/ICE:  Moist heat may be used on the injection area. NO ICE.  MEDICATION: You may continue your prescribed medications and any over-the-counter supplements; however, it is not recommended to use aspirin or anti-inflammatories (Motrin, Advil, Aleve, Ibuprofen, Naproxen etc.) for up to 2 weeks post procedure. Tylenol Extra Strength, Tylenol Arthritis and/or any prescribed narcotics or muscle relaxants are OK to take.    APPOINTMENTS: You should have a follow-up appointment with Dr. Woodruff scheduled 1-3 weeks as recommended after your procedure for your next round of injections or to follow-up after you have completed your injection series. Please schedule your follow-up appointment on your way out after your procedure, or call the office to schedule these  appointments as soon as possible.    PRECAUTIONS: Smoking, caffeine, alcohol, sex and anti-inflammatories post-procedure may reduce the effectiveness of Prolotherapy/Neural Prolotherapy/Prolozone injections. Early, rare post procedure problems that can be concerning are as follows: an increase in pain not relieved by medication (over the counter or prescription), a temperature above 101 degrees, bleeding or progressive, extreme swelling, or numbness.     CALL THE OFFICE OR GO TO THE EMERGENCY ROOM IF ANY OF THESE SYMPTOMS OCCUR.   If you have any questions or problems, please call our office at [phone]     Treatment or Intervention:  May continue to alternate ice and moist heat as needed  Once again, reviewed degenerative joint disease of the knees, patellofemoral tracking syndrome, and/or patellar tendinitis as well as meniscus injury in detail with the patient to the level of their understanding; a copy of this handout was provided to the patient at the time of this office visit.  Continue Physical Therapy exercises daily   once again, reviewed home exercises to be performed by the patient routinely  Once again stressed the importance of wearing shoes with good stability control to help with the biomechanics affecting the knees and lower extremities  Once again, stressed the importance of wearing full foot insoles to help with the biomechanics affecting the knees and lower extremities  Once again, recommendation over-the-counter vitamin-D 2 -3000+ milligrams a day, as well as a daily multivitamin.  Continue to take over-the-counter curcumin, turmeric, boswellia, as well as egg shell membrane as directed to aid with joint inflammation.  Continue to take over-the-counter Move Free for joint health.  Once again patient advised regarding the risks and/or potential adverse reactions and/or side effects of any prescribed medications along with any over-the-counter medications or any supplements used. Patient  advised to seek immediate medical care if any adverse reactions occur. The patient and/or patient(s) parent(s) verbalized their understanding  Reviewed BILATERAL knee MRI in detail with the patient and/or patients parent/legal guardian to their level of understanding; a copy of these results were provided to the patient and/or patients parent/legal guardian at the time of this office visit. Discussed treatment options with the patient and/or patients parent/legal guardian in detail to the level of their understanding. The patient and/or patients parent/legal guardian verbalized their understanding and agreement to the treatment plan at the time of this office visit.   Discussed in detail with the patient to the level of their understanding the possibility in the future of regenerative injections versus corticosteroid injections versus viscosupplementation injections versus a combination  Once again, talked in detail about exercise modifications and how to avoid repetitive overuse his knees.  Once again, talked about different diets as well as intermittent fasting  Once again, possibility in the future will have patient get 4 millimeter heel lift placed into his left shoe to accommodate leg-length discrepancy found on standing erect pelvis x-ray  Performed Euflexxa #1 injection in BILATERAL knees under ultrasound. The patient tolerated the procedure well and without any adverse effects. Discharge instructions for viscosupplementation injections were reviewed in detail with the patient to the level of their understanding; a copy of these instructions were provided to the patient at the time of this office visit.     Follow-up next week for Euflexxa #2 injection into the patients BILATERAL knees or sooner as needed.

## 2023-12-12 ENCOUNTER — OFFICE VISIT (OUTPATIENT)
Dept: SPORTS MEDICINE | Facility: CLINIC | Age: 43
End: 2023-12-12
Payer: COMMERCIAL

## 2023-12-12 VITALS
SYSTOLIC BLOOD PRESSURE: 122 MMHG | DIASTOLIC BLOOD PRESSURE: 82 MMHG | WEIGHT: 315 LBS | HEIGHT: 68 IN | HEART RATE: 69 BPM | BODY MASS INDEX: 47.74 KG/M2

## 2023-12-12 DIAGNOSIS — M76.51 PATELLAR TENDINITIS, RIGHT KNEE: ICD-10-CM

## 2023-12-12 DIAGNOSIS — Q66.6 VALGUS DEFORMITY OF BOTH FEET: ICD-10-CM

## 2023-12-12 DIAGNOSIS — M22.2X2 PATELLOFEMORAL DISORDERS, LEFT KNEE: ICD-10-CM

## 2023-12-12 DIAGNOSIS — M76.31 ILIOTIBIAL BAND SYNDROME OF RIGHT SIDE: ICD-10-CM

## 2023-12-12 DIAGNOSIS — S83.8X1D ACUTE MEDIAL MENISCAL INJURY OF RIGHT KNEE, SUBSEQUENT ENCOUNTER: ICD-10-CM

## 2023-12-12 DIAGNOSIS — M25.561 ACUTE PAIN OF RIGHT KNEE: ICD-10-CM

## 2023-12-12 DIAGNOSIS — S83.8X2D ACUTE MEDIAL MENISCAL INJURY OF LEFT KNEE, SUBSEQUENT ENCOUNTER: ICD-10-CM

## 2023-12-12 DIAGNOSIS — M76.32 ILIOTIBIAL BAND SYNDROME OF LEFT SIDE: ICD-10-CM

## 2023-12-12 DIAGNOSIS — M25.562 ACUTE PAIN OF LEFT KNEE: ICD-10-CM

## 2023-12-12 DIAGNOSIS — M21.70 LEG LENGTH DISCREPANCY: ICD-10-CM

## 2023-12-12 DIAGNOSIS — M62.89 HAMSTRING TIGHTNESS: ICD-10-CM

## 2023-12-12 DIAGNOSIS — M17.0 PRIMARY OSTEOARTHRITIS OF BOTH KNEES: ICD-10-CM

## 2023-12-12 DIAGNOSIS — M76.52 PATELLAR TENDINITIS, LEFT KNEE: ICD-10-CM

## 2023-12-12 DIAGNOSIS — M22.2X1 PATELLOFEMORAL DISORDERS, RIGHT KNEE: ICD-10-CM

## 2023-12-12 PROCEDURE — 99214 OFFICE O/P EST MOD 30 MIN: CPT | Performed by: FAMILY MEDICINE

## 2023-12-12 PROCEDURE — 20611 DRAIN/INJ JOINT/BURSA W/US: CPT | Performed by: FAMILY MEDICINE

## 2023-12-12 PROCEDURE — 3048F LDL-C <100 MG/DL: CPT | Performed by: FAMILY MEDICINE

## 2023-12-12 PROCEDURE — 3079F DIAST BP 80-89 MM HG: CPT | Performed by: FAMILY MEDICINE

## 2023-12-12 PROCEDURE — 3074F SYST BP LT 130 MM HG: CPT | Performed by: FAMILY MEDICINE

## 2023-12-12 PROCEDURE — 1036F TOBACCO NON-USER: CPT | Performed by: FAMILY MEDICINE

## 2023-12-12 PROCEDURE — 3062F POS MACROALBUMINURIA REV: CPT | Performed by: FAMILY MEDICINE

## 2023-12-12 PROCEDURE — 99214 OFFICE O/P EST MOD 30 MIN: CPT | Mod: 50,59,25 | Performed by: FAMILY MEDICINE

## 2023-12-12 PROCEDURE — 2500000004 HC RX 250 GENERAL PHARMACY W/ HCPCS (ALT 636 FOR OP/ED): Performed by: FAMILY MEDICINE

## 2023-12-12 PROCEDURE — 4010F ACE/ARB THERAPY RXD/TAKEN: CPT | Performed by: FAMILY MEDICINE

## 2023-12-12 RX ADMIN — Medication 20 MG: at 08:15

## 2023-12-12 ASSESSMENT — ENCOUNTER SYMPTOMS
OCCASIONAL FEELINGS OF UNSTEADINESS: 0
LOSS OF SENSATION IN FEET: 0
DEPRESSION: 0

## 2023-12-12 ASSESSMENT — PAIN SCALES - GENERAL
PAINLEVEL_OUTOF10: 1
PAINLEVEL: 1

## 2023-12-12 ASSESSMENT — PATIENT HEALTH QUESTIONNAIRE - PHQ9
1. LITTLE INTEREST OR PLEASURE IN DOING THINGS: NOT AT ALL
SUM OF ALL RESPONSES TO PHQ9 QUESTIONS 1 AND 2: 0
2. FEELING DOWN, DEPRESSED OR HOPELESS: NOT AT ALL

## 2023-12-12 ASSESSMENT — PAIN DESCRIPTION - DESCRIPTORS: DESCRIPTORS: OTHER (COMMENT)

## 2023-12-12 ASSESSMENT — PAIN - FUNCTIONAL ASSESSMENT: PAIN_FUNCTIONAL_ASSESSMENT: 0-10

## 2023-12-13 DIAGNOSIS — E53.8 VITAMIN B12 DEFICIENCY: Primary | ICD-10-CM

## 2023-12-18 NOTE — PATIENT INSTRUCTIONS
PAIN:  A mild to moderate amount of discomfort, tenderness, stiffness, and achiness-caused by the inflammation of the area can be expected for a few days after the injection. This is normal and good as the inflammation is an important part of the healing process.     SKIN: Due to the numbing spray used, you may develop a red, itchy, scaly rash in the area that was sprayed. Should your skin become itchy, wash the area with mild soap and warm water. If needed, you may apply topical over-the-counter Hydrocortisone cream to alleviate any itchiness. AVOID scratching due to risk of infection.,      BATHING/SWIMMING: You may shower after your procedure with regular soap and water, but no baths, no swimming, and no hot tubs for 3-4 days after the procedure.,      ACTIVITIES:  Immediately following the injection, you may resume daily activities (such as work) and light exercise. We suggest that you refrain from strenuous activity and heavy lifting, particularly the injured body part, for a week post-procedure, but sometimes this can change as well.     MOIST HEAT/ICE:  Moist heat may be used on the injection area. NO ICE.  MEDICATION: You may continue your prescribed medications and any over-the-counter supplements; however, it is not recommended to use aspirin or anti-inflammatories (Motrin, Advil, Aleve, Ibuprofen, Naproxen etc.) for up to 2 weeks post procedure. Tylenol Extra Strength, Tylenol Arthritis and/or any prescribed narcotics or muscle relaxants are OK to take.     APPOINTMENTS: You should have a follow-up appointment with Dr. Woodruff scheduled 1-3 weeks as recommended after your procedure for your next round of injections or to follow-up after you have completed your injection series. Please schedule your follow-up appointment on your way out after your procedure, or call the office to schedule these appointments as soon as possible.     PRECAUTIONS: Smoking, caffeine, alcohol, sex and anti-inflammatories  post-procedure may reduce the effectiveness of Prolotherapy/Neural Prolotherapy/Prolozone injections. Early, rare post procedure problems that can be concerning are as follows: an increase in pain not relieved by medication (over the counter or prescription), a temperature above 101 degrees, bleeding or progressive, extreme swelling, or numbness.      CALL THE OFFICE OR GO TO THE EMERGENCY ROOM IF ANY OF THESE SYMPTOMS OCCUR.   If you have any questions or problems, please call our office at [phone]      Treatment or Intervention:   May continue to alternate ice and moist heat as needed  Once again, reviewed degenerative joint disease of the knees, patellofemoral tracking syndrome, and/or patellar tendinitis as well as meniscus injury in detail with the patient to the level of their understanding; a copy of this handout was provided to the patient at the time of this office visit.  Continue Physical Therapy exercises daily   once again, reviewed home exercises to be performed by the patient routinely  Once again stressed the importance of wearing shoes with good stability control to help with the biomechanics affecting the knees and lower extremities  Once again, stressed the importance of wearing full foot insoles to help with the biomechanics affecting the knees and lower extremities  Once again, recommendation over-the-counter vitamin-D 2 -3000+ milligrams a day, as well as a daily multivitamin.  Continue to take over-the-counter curcumin, turmeric, boswellia, as well as egg shell membrane as directed to aid with joint inflammation.  Continue to take over-the-counter Move Free for joint health.  Once again patient advised regarding the risks and/or potential adverse reactions and/or side effects of any prescribed medications along with any over-the-counter medications or any supplements used. Patient advised to seek immediate medical care if any adverse reactions occur. The patient and/or patient(s)  parent(s) verbalized their understanding  Reviewed BILATERAL knee MRI in detail with the patient and/or patients parent/legal guardian to their level of understanding; a copy of these results were provided to the patient and/or patients parent/legal guardian at the time of this office visit. Discussed treatment options with the patient and/or patients parent/legal guardian in detail to the level of their understanding. The patient and/or patients parent/legal guardian verbalized their understanding and agreement to the treatment plan at the time of this office visit.   Discussed in detail with the patient to the level of their understanding the possibility in the future of regenerative injections versus corticosteroid injections versus viscosupplementation injections versus a combination  Once again, talked in detail about exercise modifications and how to avoid repetitive overuse his knees.  Once again, talked about different diets as well as intermittent fasting  Once again, possibility in the future will have patient get 4 millimeter heel lift placed into his left shoe to accommodate leg-length discrepancy found on standing erect pelvis x-ray  Performed Euflexxa #2 injection in BILATERAL knees under ultrasound. The patient tolerated the procedure well and without any adverse effects. Discharge instructions for viscosupplementation injections were reviewed in detail with the patient to the level of their understanding; a copy of these instructions were provided to the patient at the time of this office visit.   Follow-up next week for Euflexxa injection #3 into bilateral knees     Diagnostic studies:  No additional imaging or laboratory studies are needed at this time.     Activity Instructions, Restrictions, and Accommodations:  The family has been provided a note (after visit summary) outlining all current activity instructions, restrictions, and accommodations.     Consultations/Referrals:  None at this time.      Follow-up in 1-2 weeks for Euflexxa #3 injection into the patient's  Bilateral Knees or sooner as needed.

## 2023-12-18 NOTE — PROGRESS NOTES
"Verbal consent of the patient and/or verbal parental consent for patients under the age of 18 have been obtained to conduct a physical examination at this office visit.    Established patient  History Of Present Illness  12/27/23 Ned Ernandez is a 43 y.o. male who presents for Euflexxa Injection 2/3 injection into his BILATERAL knees. Pt states that he has had noticeable improvement since beginning injections. There is intermittent sharp mid right patellar pain and posterior pain which occurs less frequently along his mid patella. Upon walking up stairs pain is exacerbated. He is not currently using any pain management interventions at this time.   Overall he is very happy with the results of the injections that he feels are helping his knees tremendously      Last Recorded Vitals  /88   Pulse 92   Ht 1.727 m (5' 8\")   Wt (!) 181 kg (400 lb)   BMI 60.82 kg/m²      All previous Progress Notes and imaging results related to this patients chief complaint have been reviewed in preparation for this examination.    Past Medical History  He has a past medical history of Diabetes mellitus type 1 (CMS/[facility]), MI (myocardial infarction) (CMS/[facility]), and Osteoarthritis (10/2023).    Surgical History  He has a past surgical history that includes Cardiac catheterization (11/2014); Arterial bypass surgry (12/2014); Coronary stent placement (08/31/2020); IR ablation vein (11/2022); and Spider vein injection (Right, 11/2022).     Social History  He reports that he has never smoked. He has never used smokeless tobacco. He reports that he does not currently use alcohol. He reports that he does not use drugs.    Family History  Family History   Problem Relation Name Age of Onset    No Known Problems Mother      No Known Problems Father      No Known Problems Sister      Diabetes Maternal Grandfather      Cancer Paternal Grandmother      Diabetes Paternal Grandmother      Heart disease Paternal Grandfather        "   Allergies  Patient has no known allergies.    Historical Clinical Intake  July 7, 2023 Verbal consent of the patient and/or verbal parental consent for patients under the age of 18 have been obtained to conduct a physical examination at this office visit. Ned is a 42 year old male who was graciously referred into the office by Dr. López who present for an initial evaluation of his BILATERAL knee pain R>L. He reports a history of RIGHT knee pain with an injury occurring at 18 years old while playing racket ball. He describes his RIGHT knee pain as a throbbing ache proximal to the posterolateral aspect of his patella that will radiate into his right Achilles tendon. He states his LEFT knee pain has gotten significantly worse since his compensating for his right knee occurs along the medial aspect which he also describes as a throbbing ache. Ned has positive crepitus with reported popping with ambulation. He denies any numbness, tingling or instability. He rates his pain at 6/10 today. He takes OTC Aleve alternating with OTC Tylenol and elevates his BLE for pain management with minimal to no relief. His PCP, Dr. López, ordered an US of his RLE which was negative for DVT prior to this examination. I stressed to him the importance based off his medical history of having open heart surgery already that he should not be taking any non steroid anti-inflammatory drugs whatsoever he should only be taking Tylenol. We talked in detail about extra supplements that he could take. We also talked in detail about a significant amount of weight gain that he needs to start being very attentive to is very active and we talked about modifications and exercises that he can do regularly.  ------------------------  September 20, 2023 Verbal consent of the patient and/or verbal parental consent for patients under the age of 18 have been obtained to conduct a physical examination at this office visit. Ned is a newly established patient  who is present here today for a reevaluation of his BILATERAL knees. Ned states that he has not noticed any significant change in symptoms; and he continues to c/o bony pain along the posterior medial aspect of both knees He is taking Curcumin/Tumeric and OTC Move Free as directed previously and is also taking Tylenol Arthritis with no change in symptoms. He is unable to take NSAIDS due to history of heart surgery. He has completed 6-8 weeks of physical therapy with no significant improvement in knee pain and is continuing therapy and performing his home exercise program. He was referred back to our office for reevaluation as he was not improving. He states that his RIGHT knee hurts more than his LEFT. Pain is currently 4/10 in right posterior lateral knee and 1/10 on the LEFT knee. Pain is worse with climbing stairs, squatting, and prolonged walking. He was referred back to office by PT because not getting any better and actuaaly getting worse.  ------------------------------------------------------  10/30/23Sarahrichard Ernandez is a 43 y.o. male who presents to review his BILATERAL knee MRI which was performed at [facility]. Pt continues to have bilateral knee pain R >L. Pt reports intermittent pain on lateral and posterior knees. Worse with standing for long periods of time and with exertion. Currently treating pain with Tylenol Arthritis, Move Free, and turmeric-curcumin supplements and ice. Pt has been wearing good supportive shoes and inserts and wears compression stockings. Pt completed physical therapy and is continuing with HEP. Pt feels pain has lessened in frequency since starting supplements and doing PT. Walking up and down stairs causes pain as he has to go one foot at a time. He is an  and sits typically all day.  He would like to start injections as soon as possible.  He says that since he is plateaued so much during physical therapy and it has not helped much she is hoping that the  injections will help.  I told them are going to have to get approval for Zilretta injections since he is diabetic for his right knee for his distal IT band syndrome friction syndrome.  Also had to get approval for viscosupplementation injections as well.  We also discussed he has to talk to his cardiologist who prescribed his blood thinner to see what they want us to do for stopping before injections and keeping him off of it after injections.   -------------------------------  11/28/23 Ned Ernandez is a 43 y.o. male who presents for a corticosteroid injection into the patients BILATERAL knees. Patient continues to experience pain in both knees around all sides but the lateral and posterior side are worse. He states he constantly feels the bones of both knees popping. Pain is worse when moving from standing to sitting position and ascending stairs. Pain is also worse without shoes. He treats the pain with Tylenol when needed. He continues to wear good supportive footwear with inserts with his 4mm heel lift in LEFT shoe. He completed physical therapy but continues to participate in HEP. He also continues to take Move Free an turmeric supplements. Patient discussed stopping his Xarelto and aspirin with cardiologist, Dr. Alexis during the course of the injections.   I told him we try to get approval for the Zilretta injections however his insurance did not allow it so he had a corticosteroid injection and they wanted to see how that affected his blood sugars.  I told him to monitor his blood sugars regularly and he may have to use some insulin.    --------------------------------------------------  12/11/23 Ned Ernandez is a 43 y.o. male who presents for Euflexxa Injection 1/3 injection into the patients BILATERAL knees. Patient reports resolution of BILATERAL knee pain until yesterday when he reports placing a stress on the outside of his RIGHT knee stepping out of a car. Patient notes 1/10 pain in the lateral  aspect of his RIGHT knee that was not significant enough to take NSAIDs to resolve, but is still present this morning. Patient received corticosteroid injection to bilateral knees two weeks ago. He was denied Zilretta injections by his insurance despite being diabetic. He reports that his pain corticosteroid injection improved significantly after the injections; however, he battled high blood glucose levels. He reports that he needed to take 3-4 times the amount of normal insulin to keep his sugars between 350-400. He states that this lasted 4 days before his sugar levels returned to normal just above 100.      Review of Systems:  CONSTITUTIONAL:   Negative for weight change, loss of appetite, fatigue, weakness, fever, chills, night sweats, headaches .           HEENT:   Negative for cold, cough, sore throat, sinus pain, swollen lymph nodes.           OPHTHALMOLOGY:   Negative for diminished vision, blurred vision, loss of vision, double vision.           ALLERGY:   Negative for runny nose, scratchy throat, sinus congestion, rash, facial pressure, nasal congestion, post-nasal drip.           CARDIOLOGY:   Negative for chest pain, palpitations, murmurs, irregular heart beat, shortness of breath, leg edema, dyspnea on exertion, fatigue, dizziness.           RESPIRATORY:   Negative for chest pain, shortness of breath, swelling of the legs, asthma/copd, chest congestion, pain with breathing .           GASTROENTEROLOGY:   Negative for nausea, vomitting, heartburn, constipation, diarrhea, blood in stool, change in bowel habits, black stool.           HEMATOLOGY/LYMPH:   Negative for fatigue, loss of appetitie, easy bruising, easy bleeding, anemia, abnormal bleeding, slow healing.           ENDOCRINOLOGY:   Negative for polyuria, polydipsia, polyphagia, fatigue, weight loss, weight gain, cold intolerance, heat intolerance, diabetes.           MUSCULOSKELETAL:   Positive  for Bilateral Knee pain         DERMATOLOGY:    Negative for rash, bruising.           NEUROLOGY:   Negative for tingling, numbness, gait abnormality, paresthesias, weakness, sciatica.          General Examination:  GENERAL APPEARANCE: appears well, pleasant, and cooperative, NAD.   HEENT: normal, unremarkable.   NECK: supple.   HEART: RRR, normal S1S2.   LUNGS: clear to auscultation bilaterally.   ABDOMEN: soft, NT/ND, BS present.   EXTREMITIES: no cyanosis, clubbing.   SKIN: no rash or cellulitis.   NEUROLOGIC EXAM: awake, A&O x 3, CN's II-XII grossly intact.   PSYCH: good eye contact, appropriate mood and affect .            General (KNEE): All findings improved tremendously since starting injections  Location:  BILATERAL RIGHT laterally more so than LEFT   Erythema: Negative.   Edema:  Positive: BILATERAL .   Effusion: Negative.   Warmth: Negative.   Percussion Test: Positive: BILATERAL Posterior Lateral Tibial Plateau , Distal Lateral Femoral Condyle.   Tuning Fork Test: Positive: BILATERAL Posterior Lateral Tibial Plateau , Distal Lateral Femoral Condyle.   Ecchymosis/Bruising: Negative.   Abrasions: Negative.   Palpation: Positive: BILATERAL Tenderness to Palpation over the medial joint line as well as around the medial lateral patellar retinaculum, distal IT bands right more than left, Posterior Lateral Tibial Plateau , Distal Lateral Femoral Condyle.   Baker's Cyst: Negative.   Orientation: Symmetrical.   Range of Motion: FROM, Not Painful  Knee Flexion ( 0-135 degrees)  Knee Extension ( 0-15 degrees)  Hip Flexion (120-130 degrees)  Hip Extension (10-20 degrees)  Hip ABduction towards body (45 degrees)  Hip ADduction away from body (30 degrees)  Internal Rotation (45 degrees)          External Rotation (50 degrees).        Muscle Strength:   +5/+5 Quadricep Extension  +5/+5 Hamstring Flexion  +5+5 Hip Flexion  +5+5 Hip Extension  +5/+5 Hip ABduction away from body  +5/+5 Hip ADduction towards body  +5/+5 Hip Internal Rotation  +5/+5 Hip External  Rotation  +5+5 IT-Band  +5+5 Gastrocnemius  +5+5 Soleus.      DTR/Neurological:   +2/+4 Patellar (L4)  +2/+4 Posterior Tibialis Reflex and Medial Hamstrings (L5)  +2/+4 Achilles (S1).            Sensation/Neurological Lumbar:    Negative, Sensation intact:  L1: Low back, hips, and groin  L2: Low back and front of inside of upper leg/thigh  L3: Low back and front of upper leg/thigh  L4: Low back, front of upper leg/thigh, front of lower leg/calf, front of medial area of knee, and inside of ankle  L5: Low back, front and lateral knee, front and outside of lower leg/calf, top and bottom of foot and first four toes especially big toe.      Sensation/Neurological Sacrum:   Negative, Sensation Intact, 2 -Point Discrimination Test: Negative  S1: low back, back of upper leg/thigh, back and inside of lower leg, calf and little toe  S2: Buttocks, genitals, back of upper leg/thigh and calves  S3: Buttocks and genitals  S4: Buttocks  S5: Buttocks.   Pain with Squat:  Positive:. All findings improved tremendously since starting injections  Pain with Sumo Squat:  Positive:. All findings improved tremendously since starting injections  Vascular: +2/+4 Femoral, +2/+4 Dorsalis Pedis, +2/+4 Posterior Tibial  Capillary Refill less than 2 seconds.   Feet/Foot: Positive Valgus Foot Deformity (Pes Planus) BILATERAL.   Leg length discrepancy:  Positive: Noted on physical examination standing erect pelvis x-ray shows minimal leg-length discrepancy.     Knee - ACL:  Anterior Drawer Test: Negative.   Lachman Test: Negative.   Prone Lachman Test: Negative.   Lelli Test: Negative.   Pivot Shift Test: Negative.   Crossover Test: Negative.      KNEE - FAT PAD:  Squeeze Test Knee Bent going into extension: Negative.      KNEE - IT BAND:All findings improved tremendously since starting injections  Belkys Test:  Positive: Right greater than left.   Hobbs Test: Positive: Right greater than left.        KNEE - MCL / LCL:All findings improved  tremendously since starting injections  Valgus Stress Test:  90 degrees: Negative, 20-30 degrees: Negative.   Varus Stress Test:  90 degrees: Negative, 20-30 degrees: Negative.   Apley Distraction Test:  Positive:, Medial:, MCL:.   Thessaly Test:  Positive Medial meniscus.        KNEE - MENISCUS:All findings improved tremendously since starting injections  Apley Compression Test:  Positive, Medial MCL.   Duck Walk Test:  Negative.   Stienmann 1 Test:  Negative.   Stienmann 2 Test:  Negative.   Porfirio Test:  Positive: BILATERAL lateral meniscus .   Bragard's Test:  Negative.   Bounce Home Test:  Negative.   Payr Test:  Negative.   Thessaly Test:  Positive:, Medial meniscus.   Yordan's Test:  Negative.        KNEE - PATELLA:All findings improved tremendously since starting injections  Apprehension Test:  Negative.   Glide Test:  Negative.   Facet Tenderness Test:  Negative.   Medial Patellar Plica Test:  Negative.   Grind Test:  Positive:, Relieved with lateral pressure on patella.   Patella Tracking Test:  Negative.   Medial/Lateral Subluxation Test:  Negative.   Suppression Test:  Negative.        Knee - PCL/POSTERIOR LATERAL CORNER:  Posterior Drawer Test: Negative.   Jimenes 90/90 (Sag Sign) Test: Negative.   Dial Test: Negative.   Reverse Lachman Test: Negative.   Quad Activation Test: Negative.   ER / Recurvatum Test: Negative.   Reverse Pivot Shift Test: Negative.      KNEE - POPLITEUS:  Jean Pierre's Test: Negative.      KNEE - QUADRICEPS:  Dreyer Test: Negative.   VMO Test: Negative.   VLO Test: Negative.      Hip/Pelvis - Sacrum:  Leg Length Supine:  Negative, Verified with standing erect pelvis X-ray  Leg Length Supine to Seated (Derbolowsky Sign):  Negative, Verified with standing erect pelvis X-ray   Standing Flexion Test:  Negative.   Seated Flexion Test:  Negative   Spring Test:  Negative   Sacral Somatic Dysfunction:  Negative   Sacroiliac (SI) Joint Fixation Test:  Negative   Hip Flexor Tightness:   Negative   Hamstring Tightness:  Positive.        General (LOWER LEG/ANKLE/FEET):  Location: Right Achilles compensatory for knee.   Erythema: Negative.   Edema:  Positive, Diffusely, Posterior however improved.   Effusion: Negative.   Warmth: Negative.   Ecchymosis/Bruising: Negative.   Percussion Test:  Positive, Calcaneus, Insertion of the Achilles Tendon.   Tuning Fork Test: Negative.   Abrasions: Negative.   Orientation: Positive Asymmetrical, because of swelling however improved.   ROM: FROM, however pain with dorsiflexion.   All findings remain the same since last visit with no relief.      Muscle Strength:   All findings improved  Positive:,  +4-+5/+5Planar Flexion Causes pain  +4-+5/+5 Dorsi Flexion Causes pain  +5/+5 Inversion  +5/+5 Eversion  +5/+5 Plantarflexion in combination with inversion  +5/+5 Plantarflexion in combination with eversion  +5/+5 Dorsiflexion in combination with inversion (Posterior Tibialis)  +5/+5 Dorsiflexion in combination with eversion (Peroneal Brevis)  +5/+5 Dorsiflexion in combination with eversion and flexion of great toe (Peroneal Longus).       DTR/Neurological:   Sensation Intact, 2 Point Discrimination Test: Negative  +2/+4 Achilles Tendon Reflex (S1).            Sensation/Neurological:   Sensation Intact, 2 Point Discrimination Test: Negative  L3: Low back and front of upper leg/thigh  L4: Low back, front of upper leg/thigh, front of lower leg/calf, front of medial area of knee, and inside of ankle  L5: Low back, front and lateral knee, front and outside of lower leg/calf, top and bottom of foot, and first four toes especially big toe  S1: low back, back of upper leg/thigh, back and inside of lower leg, calf and little toe  S2: Buttocks, genitals, back of upper leg/thigh, and calves  S3: Buttocks and genitals.            Palpation:  Positive, still some slight tender to Palpation in the following areas: achilles tendon, insertion of the achilles tendon, origin of the  achilles tendon, and calcaneus.           Vascular:   +2/+4 Dorsalis Pedis, +2/+4 Posterior Tibialis, Capillary Refill < 2 Seconds.      BILATERAL Valgus Foot Deformity.     Leg/Ankle/Foot - Ankle Impingement:  Posterior Ankle Impingement Test: Negative.   Anterior Ankle Impingement Test: Negative.      Leg/Ankle/Foot - Ankle Instability: All findings relatively improved  Flexibility Test:  Positive, decreased.   Anterior Drawer Test:  Negative.   Talar Tilt Test:  Negative.   External Rotation Kleiger Plantar Flexion Test:  Negative.   External Rotation Kleiger Dorsiflexion Test:  Negative.   Squeeze Test:  Positive at distal achilles and calcaneus.   Dorsiflexion Test:  Positive.   Posterior Tibial or Peroneal Tendon Instability Test:  Negative.   Horizontal Squeeze Test:  Positive, at achilles and calcaneus.   Vertical Squeeze Test:  Positive, at achilles and calcaneus.   Plantarflexion:  Positive.   Standing/Walking Test:  Positive, Toe Standing, Heel Standing, Toe Walking, Heel Walking, Painful.   Proprioception Test:  Positive.   Hop Test:  Positive.        Leg/Ankle/Foot - DVT:  Kim's Sign: Negative.      Leg/Ankle/Foot - Forefoot:  Strunsky Test:  Negative.   Gaenslen Maneuver Test:  Negative.   Metatarsal Tap Test:  Negative.   Crepitation Test:  Negative.      Leg/Ankle/Foot - Hindfoot Achilles/Calcaneus:  Zhu Compression Test:  Negative.   Hoffa Sign:  Negative.   Achilles Tendon Tap Test:  Positive.   Heel Compression Test:  Negative.   Heel Thump Test:  Positive.   All findings remain the same since last visit with no relief.      Leg/Ankle/Foot - Nerve Irritation:  Eloise Sign:  Negative.   Digital Nerve Stretch Test:  Negative.   Tinel Sign:  Positive at insertion of achilles into calcaneus.   Tourniquet Sign:  Negative.       Diagnostic studies:  Standing erect pelvis x-ray shows the following:  Left leg shorter than right leg at the iliac crest by 6 millimeters mm  Right leg shorter than left  leg at Sacral Base by 6 millimeters mm  Left leg shorter than right leg at midline Femoral Heads by 4 mm  Overall difference left leg shorter than right leg by approximately 4 millimeters                    PROCEDURE: PELVIS 1 OR 2 VIEW - TXR 0039  REASON FOR EXAM: LEG LENGTH DISCREPANCY  RESULT: MRN: [id number] Patient Name: KENDALL HITCHCOCK  STUDY: PELVIS 1 OR 2 VIEW 7/7/2023 8:15 am  INDICATION: LEG LENGTH DISCREPANCY  COMPARISON: None available.     ACCESSION NUMBER(S): [id number]  ORDERING CLINICIAN: AUSTEN BONNER     TECHNIQUE: Erect AP view of the pelvis     FINDINGS:  Normal mineralization. The hip joint space is maintained bilaterally. No fracture, dislocation, or bony abnormality is seen. The pelvic ring is intact without fracture or disruption.     IMPRESSION PELVIS XR:  Normal erect AP view of the pelvis     Dictation workstation: [id number]  Original Interpreting Physician: ISRRAEL STOVER M.D.  Original Transcribed by/Date: [facility] Jul 7 2023 7:42A  ----------------------------------------------------  PROCEDURE: KNEE BI MIN 4 VIEW - TXR 0220  REASON FOR EXAM: BILAT KNEE PAIN  RESULT: MRN: [id number] Patient Name: KENDALL HITCHCOCK  STUDY: KNEE BI MIN 4 VIEW 7/7/2023 8:15 am  INDICATION: BILAT KNEE PAIN  COMPARISON: None available.     ACCESSION NUMBER(S): [id number]  ORDERING CLINICIAN: AUSTEN BONNER     TECHNIQUE: Four views of each knee are completed with 3 of the images bilaterally performed in the erect position.     FINDINGS:  On the LEFT, there is narrowing of the medial femorotibial compartment as well as the patellofemoral compartment with mild osteophytosis of the patellar articular surface and a tiny spur projecting from medial tibial plateau. There is minimal suprapatellar effusion.     On the RIGHT, there are surgical clips along the posteromedial aspect of the distal portion of the right upper leg and right knee. The medial compartment is narrowed with small osteophytes projecting from the medial tibial  plateau. Small spurs also arise from the patellar articular surface. The patellofemoral compartment is also narrowed.     IMPRESSION BILATERAL KNEE XR:  There is narrowing of the patellofemoral and medial compartments of each knee with the medial compartmental narrowing more pronounced on the right than on the left. There is mild osteophytosis of each knee observed as well.     Dictation workstation: [id number]  Original Interpreting Physician: ISRRAEL STOVER M.D.  Original Transcribed by/Date: MMODAL Jul 7 2023 7:42A  ----------------------------------------------------  PROCEDURE: LOWER LEG RT NON VASC LIMITED - WUS 2647  REASON FOR EXAM: PAIN IN RIGHT KNEE  RESULT: MRN: [id number] Patient Name: KENDALL HITCHCOCK  STUDY:LOWER LEG RT NON VASC LIMITED; 6/16/2023 3:49 pm  INDICATION:PAIN IN RIGHT KNEE posteriorly.  COMPARISON:None     ACCESSION NUMBER(S):[id number]  ORDERING CLINICIAN:CHEKO WALSH     TECHNIQUE: Using a combination of gray scale ultrasound, spectral waveform analysis, compression technique, pulsed and color Doppler, the deep venous system of the right popliteal fossa was examined.                            FINDINGS:  No cystic or solid lesions are visualized in the right popliteal fossa; specifically no Baker's cyst is identified.  The popliteal vein is compressible and displays normal flow signal.  There is no evidence of deep venous thrombosis.     IMPRESSION RLE US:  Normal ultrasound of the right popliteal fossa.     Dictation workstation: [id number]  Original Interpreting Physician: ABBY PALMA M.D.  Original Transcribed by/Date: MMODAL Jun 16 2023 3:04P     Assessment        Procedure   Time Out (5 Minutes): Yes  Patient Name: Kendall Hitchcock  YOB: 1980  Procedure: Euflexxa Injection 2/3  Needed Supplies Available: Correct Supplies  Laterality: Bilateral Knees  Site Verified and Marked: Correct Site(s) Marked  Timeout Performed by Provider: Dr. Jair Woodruff, D.O.  Staff Initials:  SLN     L Inj/Asp: bilateral knee on 12/27/2023 12:30 PM  Indications: pain  Details: 22 G needle, ultrasound-guided  Medications (Right): 2 mL sodium hyaluronate 10 mg/mL(mw 2.4 -3.6 million)  Medications (Left): 2 mL sodium hyaluronate 10 mg/mL(mw 2.4 -3.6 million)  Outcome: tolerated well, no immediate complications  Procedure, treatment alternatives, risks and benefits explained, specific risks discussed. Consent was given by the patient. Immediately prior to procedure a time out was called to verify the correct patient, procedure, equipment, support staff and site/side marked as required.            Patient is informed and consent has been signed by the patient if over 18 years old., Patient parent and/or legal guardian is informed and consent has been signed., Patient and/or parent and/or legal guardian accept all risks, benefits, and possible complications associated with procedure(s) and/or manipulation(s) and/or injection(s)., Patient and/or parent and/or legal guardian deny patient allergy or known complications with any of the medications, instruments, products, and/or techniques used., All questions and concerns were answered and/or addressed with the patient and/or parent and/or legal guardian., The patient and/or parent and/or legal guardian agree to proceed with the procedure(s) and/or manipulation(s) and/or injection(s)., Prep: The area was cleansed with a mixture of equal parts rubbing alcohol 70% and Hibclens., Utilizing clean technique, the injection site(s) were marked with a skin marker., and Injection site(s) were anesthestized with topical analgesic spray prior to injection.    Performed regenerative Euflexxa Injection 2/3 into the patient's Bilateral Knees , under ultrasound.  Euflexxa (1%) X2    Band-aid and/or compressive bandage was placed over the injection site(s). After the injection(s) performed osteopathic manipulation therapy to the affected area(s) to help increase blood flow to aid  with the healing process as well as circulation of the medication. The patient tolerated the procedure well without any complications. The patient was instructed to gently massage the treatment site(s) as well as to apply moist heat to the affected area(s). Additionally, the patient was instructed to contact the office immediately with any complications or concerns.    The Scribe, Barbara, and Nurse, Silvana, were present in the room during the entire procedure.    The following discharge instructions were reviewed in detail with the patient to the level of their understanding:    PAIN:  A mild to moderate amount of discomfort, tenderness, stiffness, and achiness-caused by the inflammation of the area can be expected for a few days after the injection. This is normal and good as the inflammation is an important part of the healing process.    SKIN: Due to the numbing spray used, you may develop a red, itchy, scaly rash in the area that was sprayed. Should your skin become itchy, wash the area with mild soap and warm water. If needed, you may apply topical over-the-counter Hydrocortisone cream to alleviate any itchiness. AVOID scratching due to risk of infection.,     BATHING/SWIMMING: You may shower after your procedure with regular soap and water, but no baths, no swimming, and no hot tubs for 3-4 days after the procedure.,     ACTIVITIES:  Immediately following the injection, you may resume daily activities (such as work) and light exercise. We suggest that you refrain from strenuous activity and heavy lifting, particularly the injured body part, for a week post-procedure, but sometimes this can change as well.    MOIST HEAT/ICE:  Moist heat may be used on the injection area. NO ICE.  MEDICATION: You may continue your prescribed medications and any over-the-counter supplements; however, it is not recommended to use aspirin or anti-inflammatories (Motrin, Advil, Aleve, Ibuprofen, Naproxen etc.) for up to 2 weeks post  procedure. Tylenol Extra Strength, Tylenol Arthritis and/or any prescribed narcotics or muscle relaxants are OK to take.    APPOINTMENTS: You should have a follow-up appointment with Dr. Woodruff scheduled 1-3 weeks as recommended after your procedure for your next round of injections or to follow-up after you have completed your injection series. Please schedule your follow-up appointment on your way out after your procedure, or call the office to schedule these appointments as soon as possible.    PRECAUTIONS: Smoking, caffeine, alcohol, sex and anti-inflammatories post-procedure may reduce the effectiveness of Prolotherapy/Neural Prolotherapy/Prolozone injections. Early, rare post procedure problems that can be concerning are as follows: an increase in pain not relieved by medication (over the counter or prescription), a temperature above 101 degrees, bleeding or progressive, extreme swelling, or numbness.     CALL THE OFFICE OR GO TO THE EMERGENCY ROOM IF ANY OF THESE SYMPTOMS OCCUR.   If you have any questions or problems, please call our office at [phone]     Treatment or Intervention:   May continue to alternate ice and moist heat as needed  Once again, reviewed degenerative joint disease of the knees, patellofemoral tracking syndrome, and/or patellar tendinitis as well as meniscus injury in detail with the patient to the level of their understanding; a copy of this handout was provided to the patient at the time of this office visit.  Continue Physical Therapy exercises daily   once again, reviewed home exercises to be performed by the patient routinely  Once again stressed the importance of wearing shoes with good stability control to help with the biomechanics affecting the knees and lower extremities  Once again, stressed the importance of wearing full foot insoles to help with the biomechanics affecting the knees and lower extremities  Once again, recommendation over-the-counter vitamin-D 2 -3000+  milligrams a day, as well as a daily multivitamin.  Continue to take over-the-counter curcumin, turmeric, boswellia, as well as egg shell membrane as directed to aid with joint inflammation.  Continue to take over-the-counter Move Free for joint health.  Once again patient advised regarding the risks and/or potential adverse reactions and/or side effects of any prescribed medications along with any over-the-counter medications or any supplements used. Patient advised to seek immediate medical care if any adverse reactions occur. The patient and/or patient(s) parent(s) verbalized their understanding  Reviewed BILATERAL knee MRI in detail with the patient and/or patients parent/legal guardian to their level of understanding; a copy of these results were provided to the patient and/or patients parent/legal guardian at the time of this office visit. Discussed treatment options with the patient and/or patients parent/legal guardian in detail to the level of their understanding. The patient and/or patients parent/legal guardian verbalized their understanding and agreement to the treatment plan at the time of this office visit.   Discussed in detail with the patient to the level of their understanding the possibility in the future of regenerative injections versus corticosteroid injections versus viscosupplementation injections versus a combination  Once again, talked in detail about exercise modifications and how to avoid repetitive overuse his knees.  Once again, talked about different diets as well as intermittent fasting  Once again, possibility in the future will have patient get 4 millimeter heel lift placed into his left shoe to accommodate leg-length discrepancy found on standing erect pelvis x-ray  Performed Euflexxa #2 injection in BILATERAL knees under ultrasound. The patient tolerated the procedure well and without any adverse effects. Discharge instructions for viscosupplementation injections were reviewed in  detail with the patient to the level of their understanding; a copy of these instructions were provided to the patient at the time of this office visit.   Follow-up next week for Euflexxa injection #3 into bilateral knees    Diagnostic studies:  No additional imaging or laboratory studies are needed at this time.    Activity Instructions, Restrictions, and Accommodations:  The family has been provided a note (after visit summary) outlining all current activity instructions, restrictions, and accommodations.    Consultations/Referrals:  None at this time.    Follow-up in 1-2 weeks for Euflexxa #3 injection into the patient's  Bilateral Knees or sooner as needed. Barbara REYNA, attest this documentation has been prepared under the direction and in the presence of Domi Woodruff D.O. By signing below, Jair REYNA D.O, personally performed the services described in this documentation. All medical record entries made by the scribe were at my direction and in my presence. I have reviewed the chart and agree that the record reflects my personal performance and is accurate and complete. Please note that this report has been partially produced using speech recognition software. It may contain errors related to grammar, punctuation or spelling. Electronically signed, but not reviewed. Jair Woodruff D.O. Director of Sports Medicine.     BARBARA KRAUS on 12/27/23 at 1:31 PM.     RIO Lamb DO    Injected bilateral knees under ultrasound with Euflexxa

## 2023-12-21 RX ORDER — CYANOCOBALAMIN 1000 UG/ML
INJECTION, SOLUTION INTRAMUSCULAR; SUBCUTANEOUS
Qty: 6 ML | Refills: 3 | Status: SHIPPED | OUTPATIENT
Start: 2023-12-21

## 2023-12-27 ENCOUNTER — OFFICE VISIT (OUTPATIENT)
Dept: SPORTS MEDICINE | Facility: CLINIC | Age: 43
End: 2023-12-27
Payer: COMMERCIAL

## 2023-12-27 VITALS
DIASTOLIC BLOOD PRESSURE: 88 MMHG | WEIGHT: 315 LBS | HEIGHT: 68 IN | BODY MASS INDEX: 47.74 KG/M2 | SYSTOLIC BLOOD PRESSURE: 136 MMHG | HEART RATE: 92 BPM

## 2023-12-27 DIAGNOSIS — M21.70 LEG LENGTH DISCREPANCY: ICD-10-CM

## 2023-12-27 DIAGNOSIS — M25.562 ACUTE PAIN OF LEFT KNEE: ICD-10-CM

## 2023-12-27 DIAGNOSIS — S83.8X2D ACUTE MEDIAL MENISCAL INJURY OF LEFT KNEE, SUBSEQUENT ENCOUNTER: ICD-10-CM

## 2023-12-27 DIAGNOSIS — M76.51 PATELLAR TENDINITIS, RIGHT KNEE: ICD-10-CM

## 2023-12-27 DIAGNOSIS — M76.31 ILIOTIBIAL BAND SYNDROME OF RIGHT SIDE: ICD-10-CM

## 2023-12-27 DIAGNOSIS — M17.0 PRIMARY OSTEOARTHRITIS OF BOTH KNEES: ICD-10-CM

## 2023-12-27 DIAGNOSIS — M79.669 PAIN OF LOWER LEG, UNSPECIFIED LATERALITY: ICD-10-CM

## 2023-12-27 DIAGNOSIS — M25.571 RIGHT ANKLE PAIN, UNSPECIFIED CHRONICITY: ICD-10-CM

## 2023-12-27 DIAGNOSIS — M76.61 ACHILLES TENDINITIS OF RIGHT LOWER EXTREMITY: ICD-10-CM

## 2023-12-27 DIAGNOSIS — M62.89 HAMSTRING TIGHTNESS: ICD-10-CM

## 2023-12-27 DIAGNOSIS — M25.561 ACUTE PAIN OF RIGHT KNEE: ICD-10-CM

## 2023-12-27 DIAGNOSIS — M76.32 ILIOTIBIAL BAND SYNDROME OF LEFT SIDE: ICD-10-CM

## 2023-12-27 DIAGNOSIS — Q66.6 VALGUS DEFORMITY OF BOTH FEET: ICD-10-CM

## 2023-12-27 DIAGNOSIS — S83.8X1D ACUTE MEDIAL MENISCAL INJURY OF RIGHT KNEE, SUBSEQUENT ENCOUNTER: ICD-10-CM

## 2023-12-27 DIAGNOSIS — M76.52 PATELLAR TENDINITIS, LEFT KNEE: ICD-10-CM

## 2023-12-27 DIAGNOSIS — M22.2X1 PATELLOFEMORAL DISORDERS, RIGHT KNEE: ICD-10-CM

## 2023-12-27 DIAGNOSIS — M22.2X2 PATELLOFEMORAL DISORDERS, LEFT KNEE: ICD-10-CM

## 2023-12-27 DIAGNOSIS — S86.019D: ICD-10-CM

## 2023-12-27 PROCEDURE — 99214 OFFICE O/P EST MOD 30 MIN: CPT | Performed by: FAMILY MEDICINE

## 2023-12-27 PROCEDURE — 3075F SYST BP GE 130 - 139MM HG: CPT | Performed by: FAMILY MEDICINE

## 2023-12-27 PROCEDURE — 20611 DRAIN/INJ JOINT/BURSA W/US: CPT | Performed by: FAMILY MEDICINE

## 2023-12-27 PROCEDURE — 3062F POS MACROALBUMINURIA REV: CPT | Performed by: FAMILY MEDICINE

## 2023-12-27 PROCEDURE — 20611 DRAIN/INJ JOINT/BURSA W/US: CPT | Mod: 50 | Performed by: FAMILY MEDICINE

## 2023-12-27 PROCEDURE — 99214 OFFICE O/P EST MOD 30 MIN: CPT | Mod: 25 | Performed by: FAMILY MEDICINE

## 2023-12-27 PROCEDURE — 4010F ACE/ARB THERAPY RXD/TAKEN: CPT | Performed by: FAMILY MEDICINE

## 2023-12-27 PROCEDURE — 3048F LDL-C <100 MG/DL: CPT | Performed by: FAMILY MEDICINE

## 2023-12-27 PROCEDURE — 3079F DIAST BP 80-89 MM HG: CPT | Performed by: FAMILY MEDICINE

## 2023-12-27 PROCEDURE — 1036F TOBACCO NON-USER: CPT | Performed by: FAMILY MEDICINE

## 2023-12-27 PROCEDURE — 2500000004 HC RX 250 GENERAL PHARMACY W/ HCPCS (ALT 636 FOR OP/ED): Performed by: FAMILY MEDICINE

## 2023-12-27 RX ADMIN — Medication 2 ML: at 12:30

## 2023-12-27 ASSESSMENT — ENCOUNTER SYMPTOMS
LOSS OF SENSATION IN FEET: 0
DEPRESSION: 0
OCCASIONAL FEELINGS OF UNSTEADINESS: 0

## 2023-12-27 ASSESSMENT — PATIENT HEALTH QUESTIONNAIRE - PHQ9
1. LITTLE INTEREST OR PLEASURE IN DOING THINGS: NOT AT ALL
2. FEELING DOWN, DEPRESSED OR HOPELESS: NOT AT ALL
SUM OF ALL RESPONSES TO PHQ9 QUESTIONS 1 AND 2: 0

## 2023-12-27 ASSESSMENT — PAIN - FUNCTIONAL ASSESSMENT: PAIN_FUNCTIONAL_ASSESSMENT: 0-10

## 2023-12-27 ASSESSMENT — PAIN SCALES - GENERAL: PAINLEVEL_OUTOF10: 0 - NO PAIN

## 2023-12-27 NOTE — PROGRESS NOTES
"Verbal consent of the patient and/or verbal parental consent for patients under the age of 18 have been obtained to conduct a physical examination at this office visit.    Established patient  History Of Present Illness  1/3/24  Ned Ernandez is a 43 y.o. male who presents for Euflexxa Injection 3/3 injection into the patients BILATERAL Knees. He states that overall he is doing significantly better since receiving Euflexxa injections into his bilateral knees. He denies any pain, instability, locking, catching, or swelling in either knee. He continues to take Tylenol and Ibuprofen for pain management as needed and is also continuing to perform his home exercise program as previously directed.  He rates his bilateral knee pain at 0/10 today.   he says he is very happy with the results of the injections and how well his knees are feeling.  He is hoping honestly he will be able to continue on getting these injections in the future and to avoid surgical intervention.    Last Recorded Vitals  /76 (BP Location: Right arm, Patient Position: Sitting, BP Cuff Size: Large adult)   Pulse 80   Ht 1.727 m (5' 8\")   Wt (!) 181 kg (400 lb)   BMI 60.82 kg/m²      All previous Progress Notes and imaging results related to this patients chief complaint have been reviewed in preparation for this examination.    Past Medical History  He has a past medical history of Diabetes mellitus type 1 (CMS/[facility]), MI (myocardial infarction) (CMS/[facility]), and Osteoarthritis (10/2023).    Surgical History  He has a past surgical history that includes Cardiac catheterization (11/2014); Arterial bypass surgry (12/2014); Coronary stent placement (08/31/2020); IR ablation vein (11/2022); and Spider vein injection (Right, 11/2022).     Social History  He reports that he has never smoked. He has never used smokeless tobacco. He reports that he does not currently use alcohol. He reports that he does not use drugs.    Family History  Family History "   Problem Relation Name Age of Onset    No Known Problems Mother      No Known Problems Father      No Known Problems Sister      Diabetes Maternal Grandfather      Cancer Paternal Grandmother      Diabetes Paternal Grandmother      Heart disease Paternal Grandfather          Allergies  House dust    Historical Clinical Intake  July 7, 2023 Verbal consent of the patient and/or verbal parental consent for patients under the age of 18 have been obtained to conduct a physical examination at this office visit. Nde is a 42 year old male who was graciously referred into the office by Dr. López who present for an initial evaluation of his BILATERAL knee pain R>L. He reports a history of RIGHT knee pain with an injury occurring at 18 years old while playing racket ball. He describes his RIGHT knee pain as a throbbing ache proximal to the posterolateral aspect of his patella that will radiate into his right Achilles tendon. He states his LEFT knee pain has gotten significantly worse since his compensating for his right knee occurs along the medial aspect which he also describes as a throbbing ache. Ned has positive crepitus with reported popping with ambulation. He denies any numbness, tingling or instability. He rates his pain at 6/10 today. He takes OTC Aleve alternating with OTC Tylenol and elevates his BLE for pain management with minimal to no relief. His PCP, Dr. López, ordered an US of his RLE which was negative for DVT prior to this examination. I stressed to him the importance based off his medical history of having open heart surgery already that he should not be taking any non steroid anti-inflammatory drugs whatsoever he should only be taking Tylenol. We talked in detail about extra supplements that he could take. We also talked in detail about a significant amount of weight gain that he needs to start being very attentive to is very active and we talked about modifications and exercises that he can do  regularly.  ------------------------  September 20, 2023 Verbal consent of the patient and/or verbal parental consent for patients under the age of 18 have been obtained to conduct a physical examination at this office visit. Ned is a newly established patient who is present here today for a reevaluation of his BILATERAL knees. Ned states that he has not noticed any significant change in symptoms; and he continues to c/o bony pain along the posterior medial aspect of both knees He is taking Curcumin/Tumeric and OTC Move Free as directed previously and is also taking Tylenol Arthritis with no change in symptoms. He is unable to take NSAIDS due to history of heart surgery. He has completed 6-8 weeks of physical therapy with no significant improvement in knee pain and is continuing therapy and performing his home exercise program. He was referred back to our office for reevaluation as he was not improving. He states that his RIGHT knee hurts more than his LEFT. Pain is currently 4/10 in right posterior lateral knee and 1/10 on the LEFT knee. Pain is worse with climbing stairs, squatting, and prolonged walking. He was referred back to office by PT because not getting any better and actuaaly getting worse.  ------------------------------------------------------  10/30/23Ned Ernandez is a 43 y.o. male who presents to review his BILATERAL knee MRI which was performed at [facility]. Pt continues to have bilateral knee pain R >L. Pt reports intermittent pain on lateral and posterior knees. Worse with standing for long periods of time and with exertion. Currently treating pain with Tylenol Arthritis, Move Free, and turmeric-curcumin supplements and ice. Pt has been wearing good supportive shoes and inserts and wears compression stockings. Pt completed physical therapy and is continuing with HEP. Pt feels pain has lessened in frequency since starting supplements and doing PT. Walking up and down stairs causes pain  as he has to go one foot at a time. He is an  and sits typically all day.  He would like to start injections as soon as possible.  He says that since he is plateaued so much during physical therapy and it has not helped much she is hoping that the injections will help.  I told them are going to have to get approval for Zilretta injections since he is diabetic for his right knee for his distal IT band syndrome friction syndrome.  Also had to get approval for viscosupplementation injections as well.  We also discussed he has to talk to his cardiologist who prescribed his blood thinner to see what they want us to do for stopping before injections and keeping him off of it after injections.   -------------------------------  11/28/23 Ned Ernandez is a 43 y.o. male who presents for a corticosteroid injection into the patients BILATERAL knees. Patient continues to experience pain in both knees around all sides but the lateral and posterior side are worse. He states he constantly feels the bones of both knees popping. Pain is worse when moving from standing to sitting position and ascending stairs. Pain is also worse without shoes. He treats the pain with Tylenol when needed. He continues to wear good supportive footwear with inserts with his 4mm heel lift in LEFT shoe. He completed physical therapy but continues to participate in HEP. He also continues to take Move Free an turmeric supplements. Patient discussed stopping his Xarelto and aspirin with cardiologist, Dr. Alexis during the course of the injections.   I told him we try to get approval for the Zilretta injections however his insurance did not allow it so he had a corticosteroid injection and they wanted to see how that affected his blood sugars.  I told him to monitor his blood sugars regularly and he may have to use some insulin.    --------------------------------------------------  12/11/23 Ned Ernandez is a 43 y.o. male who presents for Euflexxa  Injection 1/3 injection into the patients BILATERAL knees. Patient reports resolution of BILATERAL knee pain until yesterday when he reports placing a stress on the outside of his RIGHT knee stepping out of a car. Patient notes 1/10 pain in the lateral aspect of his RIGHT knee that was not significant enough to take NSAIDs to resolve, but is still present this morning. Patient received corticosteroid injection to bilateral knees two weeks ago. He was denied Zilretta injections by his insurance despite being diabetic. He reports that his pain corticosteroid injection improved significantly after the injections; however, he battled high blood glucose levels. He reports that he needed to take 3-4 times the amount of normal insulin to keep his sugars between 350-400. He states that this lasted 4 days before his sugar levels returned to normal just above 100.    -------------------------------------------------------  12/27/23 Ned Ernandez is a 43 y.o. male who presents for Euflexxa Injection 2/3 injection into his BILATERAL knees. Pt states that he has had noticeable improvement since beginning injections. There is intermittent sharp mid right patellar pain and posterior pain which occurs less frequently along his mid patella. Upon walking up stairs pain is exacerbated. He is not currently using any pain management interventions at this time.   Overall he is very happy with the results of the injections that he feels are helping his knees tremendously      Review of Systems:  CONSTITUTIONAL:   Negative for weight change, loss of appetite, fatigue, weakness, fever, chills, night sweats, headaches .           HEENT:   Negative for cold, cough, sore throat, sinus pain, swollen lymph nodes.           OPHTHALMOLOGY:   Negative for diminished vision, blurred vision, loss of vision, double vision.           ALLERGY:   Negative for runny nose, scratchy throat, sinus congestion, rash, facial pressure, nasal congestion,  post-nasal drip.           CARDIOLOGY:   Negative for chest pain, palpitations, murmurs, irregular heart beat, shortness of breath, leg edema, dyspnea on exertion, fatigue, dizziness.           RESPIRATORY:   Negative for chest pain, shortness of breath, swelling of the legs, asthma/copd, chest congestion, pain with breathing .           GASTROENTEROLOGY:   Negative for nausea, vomitting, heartburn, constipation, diarrhea, blood in stool, change in bowel habits, black stool.           HEMATOLOGY/LYMPH:   Negative for fatigue, loss of appetitie, easy bruising, easy bleeding, anemia, abnormal bleeding, slow healing.           ENDOCRINOLOGY:   Negative for polyuria, polydipsia, polyphagia, fatigue, weight loss, weight gain, cold intolerance, heat intolerance, diabetes.           MUSCULOSKELETAL:   Positive  for Bilateral Knee pain         DERMATOLOGY:   Negative for rash, bruising.           NEUROLOGY:   Negative for tingling, numbness, gait abnormality, paresthesias, weakness, sciatica.          General Examination:  GENERAL APPEARANCE: appears well, pleasant, and cooperative, NAD.   HEENT: normal, unremarkable.   NECK: supple.   HEART: RRR, normal S1S2.   LUNGS: clear to auscultation bilaterally.   ABDOMEN: soft, NT/ND, BS present.   EXTREMITIES: no cyanosis, clubbing.   SKIN: no rash or cellulitis.   NEUROLOGIC EXAM: awake, A&O x 3, CN's II-XII grossly intact.   PSYCH: good eye contact, appropriate mood and affect .            General (KNEE): All findings  Tremendously improved since starting injections   Location:  BILATERAL RIGHT laterally more so than LEFT   Erythema: Negative.   Edema:   negative   Effusion: Negative.   Warmth: Negative.   Percussion Test:  negative: BILATERAL Posterior Lateral Tibial Plateau , Distal Lateral Femoral Condyle.   Tuning Fork Test:  negative: BILATERAL Posterior Lateral Tibial Plateau , Distal Lateral Femoral Condyle.   Ecchymosis/Bruising: Negative.   Abrasions: Negative.    Palpation: Positive: BILATERAL Tenderness to Palpation over the medial joint line as well as around the medial lateral patellar retinaculum, distal IT bands right more than left, Posterior Lateral Tibial Plateau , Distal Lateral Femoral Condyle.   Baker's Cyst: Negative.   Orientation: Symmetrical.   Range of Motion: FROM, Not Painful  Knee Flexion ( 0-135 degrees)  Knee Extension ( 0-15 degrees)  Hip Flexion (120-130 degrees)  Hip Extension (10-20 degrees)  Hip ABduction towards body (45 degrees)  Hip ADduction away from body (30 degrees)  Internal Rotation (45 degrees)          External Rotation (50 degrees).         Muscle Strength:   +5/+5 Quadricep Extension  +5/+5 Hamstring Flexion  +5+5 Hip Flexion  +5+5 Hip Extension  +5/+5 Hip ABduction away from body  +5/+5 Hip ADduction towards body  +5/+5 Hip Internal Rotation  +5/+5 Hip External Rotation  +5+5 IT-Band  +5+5 Gastrocnemius  +5+5 Soleus.      DTR/Neurological:   +2/+4 Patellar (L4)  +2/+4 Posterior Tibialis Reflex and Medial Hamstrings (L5)  +2/+4 Achilles (S1).            Sensation/Neurological Lumbar:    Negative, Sensation intact:  L1: Low back, hips, and groin  L2: Low back and front of inside of upper leg/thigh  L3: Low back and front of upper leg/thigh  L4: Low back, front of upper leg/thigh, front of lower leg/calf, front of medial area of knee, and inside of ankle  L5: Low back, front and lateral knee, front and outside of lower leg/calf, top and bottom of foot and first four toes especially big toe.      Sensation/Neurological Sacrum:   Negative, Sensation Intact, 2 -Point Discrimination Test: Negative  S1: low back, back of upper leg/thigh, back and inside of lower leg, calf and little toe  S2: Buttocks, genitals, back of upper leg/thigh and calves  S3: Buttocks and genitals  S4: Buttocks  S5: Buttocks.   Pain with Squat:  Positive:. All findings  Tremendously improved since starting injections   Pain with Sumo Squat:  Positive:.All findings   Tremendously improved since starting injections   Vascular: +2/+4 Femoral, +2/+4 Dorsalis Pedis, +2/+4 Posterior Tibial  Capillary Refill less than 2 seconds.   Feet/Foot: Positive Valgus Foot Deformity (Pes Planus) BILATERAL.   Leg length discrepancy:  Positive: Noted on physical examination standing erect pelvis x-ray shows minimal leg-length discrepancy.      Knee - ACL:  Anterior Drawer Test: Negative.   Lachman Test: Negative.   Prone Lachman Test: Negative.   Lelli Test: Negative.   Pivot Shift Test: Negative.   Crossover Test: Negative.      KNEE - FAT PAD:  Squeeze Test Knee Bent going into extension: Negative.      KNEE - IT BAND: All findings  Tremendously improved since starting injections   and physical therapy  Belkys Test:  Positive: Right greater than left.   Hobbs Test: Positive: Right greater than left.         KNEE - MCL / LCL:All findings  Tremendously improved since starting injections   Valgus Stress Test:  90 degrees: Negative, 20-30 degrees: Negative.   Varus Stress Test:  90 degrees: Negative, 20-30 degrees: Negative.   Apley Distraction Test:  Positive:, Medial:, MCL:.   Thessaly Test:  Positive Medial meniscus.         KNEE - MENISCUS:All findings  Tremendously improved since starting injections   Apley Compression Test:  Positive, Medial MCL.   Duck Walk Test:  Negative.   Stienmann 1 Test:  Negative.   Stienmann 2 Test:  Negative.   Porfirio Test:  Positive: BILATERAL lateral meniscus .   Bragard's Test:  Negative.   Bounce Home Test:  Negative.   Payr Test:  Negative.   Thessaly Test:  Positive:, Medial meniscus.   Yordan's Test:  Negative.         KNEE - PATELLAAll findings  Tremendously improved since starting injections    Apprehension Test:  Negative.   Glide Test:  Negative.   Facet Tenderness Test:  Negative.   Medial Patellar Plica Test:  Negative.   Grind Test:  Positive:, Relieved with lateral pressure on patella.   Patella Tracking Test:  Negative.   Medial/Lateral Subluxation  Test:  Negative.   Suppression Test:  Negative.         Knee - PCL/POSTERIOR LATERAL CORNER:  Posterior Drawer Test: Negative.   Jimenes 90/90 (Sag Sign) Test: Negative.   Dial Test: Negative.   Reverse Lachman Test: Negative.   Quad Activation Test: Negative.   ER / Recurvatum Test: Negative.   Reverse Pivot Shift Test: Negative.      KNEE - POPLITEUS:  Jean Pierre's Test: Negative.      KNEE - QUADRICEPS:  Dreyer Test: Negative.   VMO Test: Negative.   VLO Test: Negative.      Hip/Pelvis - Sacrum:  Leg Length Supine:  Negative, Verified with standing erect pelvis X-ray  Leg Length Supine to Seated (Derbolowsky Sign):  Negative, Verified with standing erect pelvis X-ray   Standing Flexion Test:  Negative.   Seated Flexion Test:  Negative   Spring Test:  Negative   Sacral Somatic Dysfunction:  Negative   Sacroiliac (SI) Joint Fixation Test:  Negative   Hip Flexor Tightness:  Negative   Hamstring Tightness:  Positive.         General (LOWER LEG/ANKLE/FEET):  improved since therapy  Location: Right Achilles compensatory for knee.   Erythema: Negative.   Edema:  negative  Effusion: Negative.   Warmth: Negative.   Ecchymosis/Bruising: Negative.   Percussion Test:   negative, Calcaneus, Insertion of the Achilles Tendon.   Tuning Fork Test: Negative.   Abrasions: Negative.   Orientation:  negative    Muscle Strength:      improved since therapy  Positive:,  +4-+5/+5Planar Flexion   +4-+5/+5 Dorsi Flexion   +5/+5 Inversion  +5/+5 Eversion  +5/+5 Plantarflexion in combination with inversion  +5/+5 Plantarflexion in combination with eversion  +5/+5 Dorsiflexion in combination with inversion (Posterior Tibialis)  +5/+5 Dorsiflexion in combination with eversion (Peroneal Brevis)  +5/+5 Dorsiflexion in combination with eversion and flexion of great toe (Peroneal Longus).         DTR/Neurological:   Sensation Intact, 2 Point Discrimination Test: Negative  +2/+4 Achilles Tendon Reflex (S1).            Sensation/Neurological:    Sensation Intact, 2 Point Discrimination Test: Negative  L3: Low back and front of upper leg/thigh  L4: Low back, front of upper leg/thigh, front of lower leg/calf, front of medial area of knee, and inside of ankle  L5: Low back, front and lateral knee, front and outside of lower leg/calf, top and bottom of foot, and first four toes especially big toe  S1: low back, back of upper leg/thigh, back and inside of lower leg, calf and little toe  S2: Buttocks, genitals, back of upper leg/thigh, and calves  S3: Buttocks and genitals.            Palpation:   improved since therapy   negative, still some slight tender to Palpation in the following areas: achilles tendon, insertion of the achilles tendon, origin of the achilles tendon, and calcaneus.            Vascular:   +2/+4 Dorsalis Pedis, +2/+4 Posterior Tibialis, Capillary Refill < 2 Seconds.      BILATERAL Valgus Foot Deformity.     Leg/Ankle/Foot - Ankle Impingement:  Posterior Ankle Impingement Test: Negative.   Anterior Ankle Impingement Test: Negative.      Leg/Ankle/Foot - Ankle Instability:  improved since therapy  Flexibility Test:  Positive, decreased.   Anterior Drawer Test:  Negative.   Talar Tilt Test:  Negative.   External Rotation Kleiger Plantar Flexion Test:  Negative.   External Rotation Kleiger Dorsiflexion Test:  Negative.   Squeeze Test:   negative at distal achilles and calcaneus.   Dorsiflexion Test:   negative.   Posterior Tibial or Peroneal Tendon Instability Test:  Negative.   Horizontal Squeeze Test:   negative at achilles and calcaneus.   Vertical Squeeze Test:   negative at achilles and calcaneus.   Plantarflexion:   negative.   Standing/Walking Test:  Positive, Toe Standing, Heel Standing, Toe Walking, Heel Walking,  still slightly Painful.   Proprioception Test:  Positive.   Hop Test:  Positive.         Leg/Ankle/Foot - DVT:  Kim's Sign: Negative.      Leg/Ankle/Foot - Forefoot:  Strunsky Test:  Negative.   Gaenslen Maneuver Test:   Negative.   Metatarsal Tap Test:  Negative.   Crepitation Test:  Negative.      Leg/Ankle/Foot - Hindfoot Achilles/Calcaneus:  Zhu Compression Test:  Negative.   Hoffa Sign:  Negative.   Achilles Tendon Tap Test:  Positive.   Heel Compression Test:  Negative.   Heel Thump Test:  Positive.    improved since therapy     Leg/Ankle/Foot - Nerve Irritation:  Eloise Sign:  Negative.   Digital Nerve Stretch Test:  Negative.   Tinel Sign:  Positive at insertion of achilles into calcaneus.   Tourniquet Sign:  Negative.         Diagnostic studies:  Standing erect pelvis x-ray shows the following:  Left leg shorter than right leg at the iliac crest by 6 millimeters mm  Right leg shorter than left leg at Sacral Base by 6 millimeters mm  Left leg shorter than right leg at midline Femoral Heads by 4 mm  Overall difference left leg shorter than right leg by approximately 4 millimeters                    PROCEDURE: PELVIS 1 OR 2 VIEW - TXR 0039  REASON FOR EXAM: LEG LENGTH DISCREPANCY  RESULT: MRN: [id number] Patient Name: KENDALL HITCHCOCK  STUDY: PELVIS 1 OR 2 VIEW 7/7/2023 8:15 am  INDICATION: LEG LENGTH DISCREPANCY  COMPARISON: None available.     ACCESSION NUMBER(S): [id number]  ORDERING CLINICIAN: AUSTEN BONNER     TECHNIQUE: Erect AP view of the pelvis     FINDINGS:  Normal mineralization. The hip joint space is maintained bilaterally. No fracture, dislocation, or bony abnormality is seen. The pelvic ring is intact without fracture or disruption.     IMPRESSION PELVIS XR:  Normal erect AP view of the pelvis     Dictation workstation: [id number]  Original Interpreting Physician: ISRRAEL STOVER M.D.  Original Transcribed by/Date: MMODAL Jul 7 2023 7:42A  ----------------------------------------------------  PROCEDURE: KNEE BI MIN 4 VIEW - TXR 0220  REASON FOR EXAM: BILAT KNEE PAIN  RESULT: MRN: [id number] Patient Name: KENDALL HITCHCOCK  STUDY: KNEE BI MIN 4 VIEW 7/7/2023 8:15 am  INDICATION: BILAT KNEE PAIN  COMPARISON: None available.      ACCESSION NUMBER(S): [id number]  ORDERING CLINICIAN: AUSTEN BONNER     TECHNIQUE: Four views of each knee are completed with 3 of the images bilaterally performed in the erect position.     FINDINGS:  On the LEFT, there is narrowing of the medial femorotibial compartment as well as the patellofemoral compartment with mild osteophytosis of the patellar articular surface and a tiny spur projecting from medial tibial plateau. There is minimal suprapatellar effusion.     On the RIGHT, there are surgical clips along the posteromedial aspect of the distal portion of the right upper leg and right knee. The medial compartment is narrowed with small osteophytes projecting from the medial tibial plateau. Small spurs also arise from the patellar articular surface. The patellofemoral compartment is also narrowed.     IMPRESSION BILATERAL KNEE XR:  There is narrowing of the patellofemoral and medial compartments of each knee with the medial compartmental narrowing more pronounced on the right than on the left. There is mild osteophytosis of each knee observed as well.     Dictation workstation: [id number]  Original Interpreting Physician: ISRRAEL STOVER M.D.  Original Transcribed by/Date: MMODAL Jul 7 2023 7:42A  ----------------------------------------------------  PROCEDURE: LOWER LEG RT NON VASC LIMITED - WUS 2647  REASON FOR EXAM: PAIN IN RIGHT KNEE  RESULT: MRN: [id number] Patient Name: KENDALL HITCHCOCK  STUDY:LOWER LEG RT NON VASC LIMITED; 6/16/2023 3:49 pm  INDICATION:PAIN IN RIGHT KNEE posteriorly.  COMPARISON:None     ACCESSION NUMBER(S):[id number]  ORDERING CLINICIAN:CHEKO WALSH     TECHNIQUE: Using a combination of gray scale ultrasound, spectral waveform analysis, compression technique, pulsed and color Doppler, the deep venous system of the right popliteal fossa was examined.                            FINDINGS:  No cystic or solid lesions are visualized in the right popliteal fossa; specifically no Baker's cyst is  identified.  The popliteal vein is compressible and displays normal flow signal.  There is no evidence of deep venous thrombosis.     IMPRESSION RLE US:  Normal ultrasound of the right popliteal fossa.     Dictation workstation: [id number]  Original Interpreting Physician: ABBY PALMA M.D.  Original Transcribed by/Date: MMODAL Jun 16 2023 3:04P       Assessment   1. Acute pain of left knee        2. Acute medial meniscal injury of left knee, subsequent encounter        3. Patellofemoral disorders, left knee        4. Patellar tendinitis, left knee        5. Iliotibial band syndrome of left side        6. Acute pain of right knee        7. Acute medial meniscal injury of right knee, subsequent encounter        8. Patellofemoral disorders, right knee        9. Iliotibial band syndrome of right side        10. Patellar tendinitis, right knee        11. Primary osteoarthritis of both knees  Point of Care Ultrasound      12. Hamstring tightness        13. Right ankle pain, unspecified chronicity        14. Pain of lower leg, unspecified laterality        15. Achilles tendinitis of right lower extremity        16. Strain of Achilles tendon, unspecified laterality, subsequent encounter        17. Leg length discrepancy        18. Valgus deformity of both feet            Procedure   Time Out (5 Minutes): Yes  Patient Name: Ned Ernandez  YOB: 1980  Procedure: Euflexxa Injection 3/3  Needed Supplies Available: Correct Supplies  Laterality: BILATERALKnees  Site Verified and Marked: Correct Site(s) Marked  Timeout Performed by Provider: Dr. Jair Woodruff, D.O.  Staff Initials: EH    L Inj/Asp: bilateral knee on 1/3/2024 7:45 AM  Indications: pain  Details: 22 G needle, ultrasound-guided lateral approach  Medications (Right): 2 mL sodium hyaluronate 10 mg/mL(mw 2.4 -3.6 million)  Medications (Left): 2 mL sodium hyaluronate 10 mg/mL(mw 2.4 -3.6 million)  Outcome: tolerated well, no immediate  complications  Procedure, treatment alternatives, risks and benefits explained, specific risks discussed. Consent was given by the patient. Immediately prior to procedure a time out was called to verify the correct patient, procedure, equipment, support staff and site/side marked as required.            Patient is informed and consent has been signed by the patient if over 18 years old., Patient parent and/or legal guardian is informed and consent has been signed., Patient and/or parent and/or legal guardian accept all risks, benefits, and possible complications associated with procedure(s) and/or manipulation(s) and/or injection(s)., Patient and/or parent and/or legal guardian deny patient allergy or known complications with any of the medications, instruments, products, and/or techniques used., All questions and concerns were answered and/or addressed with the patient and/or parent and/or legal guardian., The patient and/or parent and/or legal guardian agree to proceed with the procedure(s) and/or manipulation(s) and/or injection(s)., Prep: The area was cleansed with a mixture of equal parts rubbing alcohol 70% and Hibclens., Utilizing clean technique, the injection site(s) were marked with a skin marker., and Injection site(s) were anesthestized with topical analgesic spray prior to injection.    Performed regenerative Euflexxa Injection 3/3 into the patients BILATERAL Knees, under ultrasound.  Euflexxa (1%)    Band-aid and/or compressive bandage was placed over the injection site(s). After the injection(s) performed osteopathic manipulation therapy to the affected area(s) to help increase blood flow to aid with the healing process as well as circulation of the medication. The patient tolerated the procedure well without any complications. The patient was instructed to gently massage the treatment site(s) as well as to apply moist heat to the affected area(s). Additionally, the patient was instructed to contact  the office immediately with any complications or concerns.    The Scribe, Barbara, and , Ne, were present in the room during the entire procedure.    The following discharge instructions were reviewed in detail with the patient to the level of their understanding:    PAIN:  A mild to moderate amount of discomfort, tenderness, stiffness, and achiness-caused by the inflammation of the area can be expected for a few days after the injection. This is normal and good as the inflammation is an important part of the healing process.    SKIN: Due to the numbing spray used, you may develop a red, itchy, scaly rash in the area that was sprayed. Should your skin become itchy, wash the area with mild soap and warm water. If needed, you may apply topical over-the-counter Hydrocortisone cream to alleviate any itchiness. AVOID scratching due to risk of infection.,     BATHING/SWIMMING: You may shower after your procedure with regular soap and water, but no baths, no swimming, and no hot tubs for 3-4 days after the procedure.,     ACTIVITIES:  Immediately following the injection, you may resume daily activities (such as work) and light exercise. We suggest that you refrain from strenuous activity and heavy lifting, particularly the injured body part, for a week post-procedure, but sometimes this can change as well.    MOIST HEAT/ICE:  Moist heat may be used on the injection area. NO ICE.  MEDICATION: You may continue your prescribed medications and any over-the-counter supplements; however, it is not recommended to use aspirin or anti-inflammatories (Motrin, Advil, Aleve, Ibuprofen, Naproxen etc.) for up to 2 weeks post procedure. Tylenol Extra Strength, Tylenol Arthritis and/or any prescribed narcotics or muscle relaxants are OK to take.    APPOINTMENTS: You should have a follow-up appointment with Dr. Woodruff scheduled 1-3 weeks as recommended after your procedure for your next round of injections or to  follow-up after you have completed your injection series. Please schedule your follow-up appointment on your way out after your procedure, or call the office to schedule these appointments as soon as possible.    PRECAUTIONS: Smoking, caffeine, alcohol, sex and anti-inflammatories post-procedure may reduce the effectiveness of Prolotherapy/Neural Prolotherapy/Prolozone injections. Early, rare post procedure problems that can be concerning are as follows: an increase in pain not relieved by medication (over the counter or prescription), a temperature above 101 degrees, bleeding or progressive, extreme swelling, or numbness.     CALL THE OFFICE OR GO TO THE EMERGENCY ROOM IF ANY OF THESE SYMPTOMS OCCUR.   If you have any questions or problems, please call our office at [phone]     Treatment or Intervention:   May continue to alternate ice and moist heat as needed  Once again, reviewed degenerative joint disease of the knees, patellofemoral tracking syndrome, and/or patellar tendinitis as well as meniscus injury in detail with the patient to the level of their understanding; a copy of this handout was provided to the patient at the time of this office visit.  Continue Physical Therapy exercises daily   once again, reviewed home exercises to be performed by the patient routinely  Once again stressed the importance of wearing shoes with good stability control to help with the biomechanics affecting the knees and lower extremities  Once again, stressed the importance of wearing full foot insoles to help with the biomechanics affecting the knees and lower extremities  Once again, recommendation over-the-counter vitamin-D 2 -3000+ milligrams a day, as well as a daily multivitamin.  Continue to take over-the-counter curcumin, turmeric, boswellia, as well as egg shell membrane as directed to aid with joint inflammation.  Continue to take over-the-counter Move Free for joint health.  Once again patient advised regarding  the risks and/or potential adverse reactions and/or side effects of any prescribed medications along with any over-the-counter medications or any supplements used. Patient advised to seek immediate medical care if any adverse reactions occur. The patient and/or patient(s) parent(s) verbalized their understanding   once again Discussed in detail with the patient to the level of their understanding the possibility in the future of regenerative injections versus corticosteroid injections versus  another round of viscosupplementation injections versus a combination  Once again, talked in detail about exercise modifications and how to avoid repetitive overuse his knees.  Once again, talked about different diets as well as intermittent fasting   recommendation once again  patient get 4 millimeter heel lift placed into his left shoe to accommodate leg-length discrepancy found on standing erect pelvis x-ray  Performed Euflexxa #3 injection in BILATERAL knees under ultrasound. The patient tolerated the procedure well and without any adverse effects. Discharge instructions for viscosupplementation injections were reviewed in detail with the patient to the level of their understanding; a copy of these instructions were provided to the patient at the time of this office visit.   Follow up  in 3 months for reevaluation of the patients BILATERAL knees or sooner as needed.     Barbara REYNA, attest this documentation has been prepared under the direction and in the presence of Domi Woodruff D.O. By signing below, Jair REYNA D.O, personally performed the services described in this documentation. All medical record entries made by the scribe were at my direction and in my presence. I have reviewed the chart and agree that the record reflects my personal performance and is accurate and complete. Please note that this report has been partially produced using speech recognition software. It may contain errors related  to grammar, punctuation or spelling. Electronically signed, but not reviewed. Austen Woodruff D.O. Director of Sports Medicine.     AUSTEN WOODRUFF on 1/3/24 at 11:51 AM.     Austen Woodruff DO FAO[facility]     injected bilateral knees under ultrasound with  Euflexxa

## 2023-12-28 DIAGNOSIS — E53.8 VITAMIN B12 DEFICIENCY: Primary | ICD-10-CM

## 2023-12-28 NOTE — PATIENT INSTRUCTIONS
PAIN:  A mild to moderate amount of discomfort, tenderness, stiffness, and achiness-caused by the inflammation of the area can be expected for a few days after the injection. This is normal and good as the inflammation is an important part of the healing process.    SKIN: Due to the numbing spray used, you may develop a red, itchy, scaly rash in the area that was sprayed. Should your skin become itchy, wash the area with mild soap and warm water. If needed, you may apply topical over-the-counter Hydrocortisone cream to alleviate any itchiness. AVOID scratching due to risk of infection.,     BATHING/SWIMMING: You may shower after your procedure with regular soap and water, but no baths, no swimming, and no hot tubs for 3-4 days after the procedure.,     ACTIVITIES:  Immediately following the injection, you may resume daily activities (such as work) and light exercise. We suggest that you refrain from strenuous activity and heavy lifting, particularly the injured body part, for a week post-procedure, but sometimes this can change as well.    MOIST HEAT/ICE:  Moist heat may be used on the injection area. NO ICE.  MEDICATION: You may continue your prescribed medications and any over-the-counter supplements; however, it is not recommended to use aspirin or anti-inflammatories (Motrin, Advil, Aleve, Ibuprofen, Naproxen etc.) for up to 2 weeks post procedure. Tylenol Extra Strength, Tylenol Arthritis and/or any prescribed narcotics or muscle relaxants are OK to take.    APPOINTMENTS: You should have a follow-up appointment with Dr. Woodruff scheduled 1-3 weeks as recommended after your procedure for your next round of injections or to follow-up after you have completed your injection series. Please schedule your follow-up appointment on your way out after your procedure, or call the office to schedule these appointments as soon as possible.    PRECAUTIONS: Smoking, caffeine, alcohol, sex and anti-inflammatories  post-procedure may reduce the effectiveness of Prolotherapy/Neural Prolotherapy/Prolozone injections. Early, rare post procedure problems that can be concerning are as follows: an increase in pain not relieved by medication (over the counter or prescription), a temperature above 101 degrees, bleeding or progressive, extreme swelling, or numbness.     CALL THE OFFICE OR GO TO THE EMERGENCY ROOM IF ANY OF THESE SYMPTOMS OCCUR.   If you have any questions or problems, please call our office at [phone]     Treatment or Intervention:   May continue to alternate ice and moist heat as needed  Once again, reviewed degenerative joint disease of the knees, patellofemoral tracking syndrome, and/or patellar tendinitis as well as meniscus injury in detail with the patient to the level of their understanding; a copy of this handout was provided to the patient at the time of this office visit.  Continue Physical Therapy exercises daily   once again, reviewed home exercises to be performed by the patient routinely  Once again stressed the importance of wearing shoes with good stability control to help with the biomechanics affecting the knees and lower extremities  Once again, stressed the importance of wearing full foot insoles to help with the biomechanics affecting the knees and lower extremities  Once again, recommendation over-the-counter vitamin-D 2 -3000+ milligrams a day, as well as a daily multivitamin.  Continue to take over-the-counter curcumin, turmeric, boswellia, as well as egg shell membrane as directed to aid with joint inflammation.  Continue to take over-the-counter Move Free for joint health.  Once again patient advised regarding the risks and/or potential adverse reactions and/or side effects of any prescribed medications along with any over-the-counter medications or any supplements used. Patient advised to seek immediate medical care if any adverse reactions occur. The patient and/or patient(s) parent(s)  verbalized their understanding  Reviewed BILATERAL knee MRI in detail with the patient and/or patients parent/legal guardian to their level of understanding; a copy of these results were provided to the patient and/or patients parent/legal guardian at the time of this office visit. Discussed treatment options with the patient and/or patients parent/legal guardian in detail to the level of their understanding. The patient and/or patients parent/legal guardian verbalized their understanding and agreement to the treatment plan at the time of this office visit.   Discussed in detail with the patient to the level of their understanding the possibility in the future of regenerative injections versus corticosteroid injections versus viscosupplementation injections versus a combination  Once again, talked in detail about exercise modifications and how to avoid repetitive overuse his knees.  Once again, talked about different diets as well as intermittent fasting  Once again, possibility in the future will have patient get 4 millimeter heel lift placed into his left shoe to accommodate leg-length discrepancy found on standing erect pelvis x-ray  Performed Euflexxa #3 injection in BILATERAL knees under ultrasound. The patient tolerated the procedure well and without any adverse effects. Discharge instructions for viscosupplementation injections were reviewed in detail with the patient to the level of their understanding; a copy of these instructions were provided to the patient at the time of this office visit.       Follow-up in 3 months for reevaluation of the patients BILATERAL knees or sooner as needed.

## 2024-01-03 ENCOUNTER — OFFICE VISIT (OUTPATIENT)
Dept: SPORTS MEDICINE | Facility: CLINIC | Age: 44
End: 2024-01-03
Payer: COMMERCIAL

## 2024-01-03 VITALS
SYSTOLIC BLOOD PRESSURE: 126 MMHG | BODY MASS INDEX: 47.74 KG/M2 | HEIGHT: 68 IN | WEIGHT: 315 LBS | DIASTOLIC BLOOD PRESSURE: 76 MMHG | HEART RATE: 80 BPM

## 2024-01-03 DIAGNOSIS — M22.2X2 PATELLOFEMORAL DISORDERS, LEFT KNEE: ICD-10-CM

## 2024-01-03 DIAGNOSIS — M62.89 HAMSTRING TIGHTNESS: ICD-10-CM

## 2024-01-03 DIAGNOSIS — S83.8X2D ACUTE MEDIAL MENISCAL INJURY OF LEFT KNEE, SUBSEQUENT ENCOUNTER: ICD-10-CM

## 2024-01-03 DIAGNOSIS — M25.561 ACUTE PAIN OF RIGHT KNEE: ICD-10-CM

## 2024-01-03 DIAGNOSIS — M21.70 LEG LENGTH DISCREPANCY: ICD-10-CM

## 2024-01-03 DIAGNOSIS — Q66.6 VALGUS DEFORMITY OF BOTH FEET: ICD-10-CM

## 2024-01-03 DIAGNOSIS — M76.31 ILIOTIBIAL BAND SYNDROME OF RIGHT SIDE: ICD-10-CM

## 2024-01-03 DIAGNOSIS — M25.562 ACUTE PAIN OF LEFT KNEE: ICD-10-CM

## 2024-01-03 DIAGNOSIS — M25.571 RIGHT ANKLE PAIN, UNSPECIFIED CHRONICITY: ICD-10-CM

## 2024-01-03 DIAGNOSIS — S86.019D: ICD-10-CM

## 2024-01-03 DIAGNOSIS — S83.8X1D ACUTE MEDIAL MENISCAL INJURY OF RIGHT KNEE, SUBSEQUENT ENCOUNTER: ICD-10-CM

## 2024-01-03 DIAGNOSIS — M22.2X1 PATELLOFEMORAL DISORDERS, RIGHT KNEE: ICD-10-CM

## 2024-01-03 DIAGNOSIS — M17.0 PRIMARY OSTEOARTHRITIS OF BOTH KNEES: ICD-10-CM

## 2024-01-03 DIAGNOSIS — M76.51 PATELLAR TENDINITIS, RIGHT KNEE: ICD-10-CM

## 2024-01-03 DIAGNOSIS — M79.669 PAIN OF LOWER LEG, UNSPECIFIED LATERALITY: ICD-10-CM

## 2024-01-03 DIAGNOSIS — M76.52 PATELLAR TENDINITIS, LEFT KNEE: ICD-10-CM

## 2024-01-03 DIAGNOSIS — M76.32 ILIOTIBIAL BAND SYNDROME OF LEFT SIDE: ICD-10-CM

## 2024-01-03 DIAGNOSIS — M76.61 ACHILLES TENDINITIS OF RIGHT LOWER EXTREMITY: ICD-10-CM

## 2024-01-03 PROCEDURE — 4010F ACE/ARB THERAPY RXD/TAKEN: CPT | Performed by: FAMILY MEDICINE

## 2024-01-03 PROCEDURE — 2500000004 HC RX 250 GENERAL PHARMACY W/ HCPCS (ALT 636 FOR OP/ED): Performed by: FAMILY MEDICINE

## 2024-01-03 PROCEDURE — 99214 OFFICE O/P EST MOD 30 MIN: CPT | Performed by: FAMILY MEDICINE

## 2024-01-03 PROCEDURE — 3074F SYST BP LT 130 MM HG: CPT | Performed by: FAMILY MEDICINE

## 2024-01-03 PROCEDURE — 3078F DIAST BP <80 MM HG: CPT | Performed by: FAMILY MEDICINE

## 2024-01-03 PROCEDURE — 20611 DRAIN/INJ JOINT/BURSA W/US: CPT | Performed by: FAMILY MEDICINE

## 2024-01-03 PROCEDURE — 99214 OFFICE O/P EST MOD 30 MIN: CPT | Mod: 50 | Performed by: FAMILY MEDICINE

## 2024-01-03 PROCEDURE — 1036F TOBACCO NON-USER: CPT | Performed by: FAMILY MEDICINE

## 2024-01-03 RX ORDER — TORSEMIDE 100 MG/1
100 TABLET ORAL EVERY 24 HOURS
COMMUNITY
Start: 2022-05-06 | End: 2024-02-07 | Stop reason: WASHOUT

## 2024-01-03 RX ADMIN — Medication 2 ML: at 07:45

## 2024-01-03 ASSESSMENT — COLUMBIA-SUICIDE SEVERITY RATING SCALE - C-SSRS
2. HAVE YOU ACTUALLY HAD ANY THOUGHTS OF KILLING YOURSELF?: NO
6. HAVE YOU EVER DONE ANYTHING, STARTED TO DO ANYTHING, OR PREPARED TO DO ANYTHING TO END YOUR LIFE?: NO
1. IN THE PAST MONTH, HAVE YOU WISHED YOU WERE DEAD OR WISHED YOU COULD GO TO SLEEP AND NOT WAKE UP?: NO

## 2024-01-03 ASSESSMENT — LIFESTYLE VARIABLES
HAVE YOU OR SOMEONE ELSE BEEN INJURED AS A RESULT OF YOUR DRINKING: NO
HOW OFTEN DURING THE LAST YEAR HAVE YOU FOUND THAT YOU WERE NOT ABLE TO STOP DRINKING ONCE YOU HAD STARTED: NEVER
HAS A RELATIVE, FRIEND, DOCTOR, OR ANOTHER HEALTH PROFESSIONAL EXPRESSED CONCERN ABOUT YOUR DRINKING OR SUGGESTED YOU CUT DOWN: NO
HOW OFTEN DURING THE LAST YEAR HAVE YOU NEEDED AN ALCOHOLIC DRINK FIRST THING IN THE MORNING TO GET YOURSELF GOING AFTER A NIGHT OF HEAVY DRINKING: NEVER
HOW OFTEN DO YOU HAVE SIX OR MORE DRINKS ON ONE OCCASION: NEVER
SKIP TO QUESTIONS 9-10: 1
AUDIT TOTAL SCORE: 0
HOW OFTEN DURING THE LAST YEAR HAVE YOU BEEN UNABLE TO REMEMBER WHAT HAPPENED THE NIGHT BEFORE BECAUSE YOU HAD BEEN DRINKING: NEVER
HOW OFTEN DURING THE LAST YEAR HAVE YOU FAILED TO DO WHAT WAS NORMALLY EXPECTED FROM YOU BECAUSE OF DRINKING: NEVER
HOW OFTEN DURING THE LAST YEAR HAVE YOU HAD A FEELING OF GUILT OR REMORSE AFTER DRINKING: NEVER
HOW OFTEN DO YOU HAVE A DRINK CONTAINING ALCOHOL: NEVER
HOW MANY STANDARD DRINKS CONTAINING ALCOHOL DO YOU HAVE ON A TYPICAL DAY: PATIENT DOES NOT DRINK
AUDIT-C TOTAL SCORE: 0

## 2024-01-03 ASSESSMENT — PATIENT HEALTH QUESTIONNAIRE - PHQ9
SUM OF ALL RESPONSES TO PHQ9 QUESTIONS 1 & 2: 0
2. FEELING DOWN, DEPRESSED OR HOPELESS: NOT AT ALL
1. LITTLE INTEREST OR PLEASURE IN DOING THINGS: NOT AT ALL
SUM OF ALL RESPONSES TO PHQ9 QUESTIONS 1 AND 2: 0
2. FEELING DOWN, DEPRESSED OR HOPELESS: NOT AT ALL
1. LITTLE INTEREST OR PLEASURE IN DOING THINGS: NOT AT ALL

## 2024-01-03 ASSESSMENT — ENCOUNTER SYMPTOMS
OCCASIONAL FEELINGS OF UNSTEADINESS: 0
DEPRESSION: 0
LOSS OF SENSATION IN FEET: 0

## 2024-01-03 ASSESSMENT — PAIN - FUNCTIONAL ASSESSMENT: PAIN_FUNCTIONAL_ASSESSMENT: NO/DENIES PAIN

## 2024-01-03 ASSESSMENT — PAIN SCALES - GENERAL: PAINLEVEL: 0-NO PAIN

## 2024-01-08 RX ORDER — SYRINGE WITH NEEDLE, 1 ML 25GX5/8"
SYRINGE, EMPTY DISPOSABLE MISCELLANEOUS
Qty: 6 EACH | Refills: 3 | Status: SHIPPED | OUTPATIENT
Start: 2024-01-08

## 2024-02-06 RX ORDER — IBUPROFEN 600 MG/1
600 TABLET ORAL EVERY 6 HOURS PRN
COMMUNITY
End: 2024-02-07 | Stop reason: WASHOUT

## 2024-02-06 RX ORDER — SILDENAFIL 100 MG/1
100 TABLET, FILM COATED ORAL AS NEEDED
COMMUNITY
End: 2024-02-07 | Stop reason: WASHOUT

## 2024-02-07 ENCOUNTER — OFFICE VISIT (OUTPATIENT)
Dept: CARDIOLOGY | Facility: CLINIC | Age: 44
End: 2024-02-07
Payer: COMMERCIAL

## 2024-02-07 VITALS
DIASTOLIC BLOOD PRESSURE: 67 MMHG | SYSTOLIC BLOOD PRESSURE: 147 MMHG | WEIGHT: 315 LBS | RESPIRATION RATE: 18 BRPM | HEART RATE: 70 BPM | OXYGEN SATURATION: 96 % | TEMPERATURE: 98.6 F | BODY MASS INDEX: 47.74 KG/M2 | HEIGHT: 68 IN

## 2024-02-07 DIAGNOSIS — I21.9 ACUTE MYOCARDIAL INFARCTION, UNSPECIFIED MI TYPE, UNSPECIFIED ARTERY (MULTI): ICD-10-CM

## 2024-02-07 DIAGNOSIS — I25.10 CORONARY ARTERY DISEASE INVOLVING NATIVE HEART, UNSPECIFIED VESSEL OR LESION TYPE, UNSPECIFIED WHETHER ANGINA PRESENT: ICD-10-CM

## 2024-02-07 DIAGNOSIS — R07.2 PRECORDIAL PAIN: Primary | ICD-10-CM

## 2024-02-07 DIAGNOSIS — I10 PRIMARY HYPERTENSION: ICD-10-CM

## 2024-02-07 PROBLEM — R07.9 CHEST PAIN: Status: ACTIVE | Noted: 2024-02-07

## 2024-02-07 PROBLEM — S30.3XXA CONTUSION OF ANUS: Status: ACTIVE | Noted: 2024-02-07

## 2024-02-07 PROBLEM — R09.1 PLEURISY: Status: ACTIVE | Noted: 2024-02-07

## 2024-02-07 PROBLEM — S61.219A LACERATION OF FINGER: Status: ACTIVE | Noted: 2022-11-24

## 2024-02-07 PROBLEM — M22.2X9 DISORDER OF PATELLOFEMORAL JOINT: Status: ACTIVE | Noted: 2023-11-27

## 2024-02-07 PROBLEM — M79.606 PAIN OF LOWER EXTREMITY: Status: ACTIVE | Noted: 2023-11-27

## 2024-02-07 PROBLEM — M76.899: Status: ACTIVE | Noted: 2024-02-07

## 2024-02-07 PROBLEM — R06.00 DYSPNEA: Status: ACTIVE | Noted: 2024-02-07

## 2024-02-07 PROBLEM — N48.1 BALANITIS: Status: ACTIVE | Noted: 2024-02-07

## 2024-02-07 PROBLEM — I31.39 PERICARDIAL EFFUSION (HHS-HCC): Status: ACTIVE | Noted: 2024-02-07

## 2024-02-07 PROBLEM — I83.10 VARICOSE VEINS OF LOWER EXTREMITY WITH INFLAMMATION: Status: ACTIVE | Noted: 2023-09-13

## 2024-02-07 PROBLEM — M67.88 OTHER SPECIFIED DISORDERS OF SYNOVIUM AND TENDON, OTHER SITE: Status: ACTIVE | Noted: 2024-02-07

## 2024-02-07 PROBLEM — R73.09 BLOOD GLUCOSE ABNORMAL: Status: ACTIVE | Noted: 2024-02-07

## 2024-02-07 PROBLEM — R31.9 HEMATURIA: Status: ACTIVE | Noted: 2022-11-18

## 2024-02-07 PROBLEM — M17.0 PRIMARY OSTEOARTHRITIS OF BOTH KNEES: Status: ACTIVE | Noted: 2023-07-24

## 2024-02-07 PROBLEM — M23.305 DERANGEMENT OF MEDIAL MENISCUS: Status: ACTIVE | Noted: 2023-11-27

## 2024-02-07 PROBLEM — K30 NON-ULCER DYSPEPSIA: Status: ACTIVE | Noted: 2024-02-07

## 2024-02-07 PROBLEM — Z79.01 LONG TERM CURRENT USE OF ANTICOAGULANT THERAPY: Status: ACTIVE | Noted: 2022-11-24

## 2024-02-07 PROBLEM — H00.019 HORDEOLUM EXTERNUM UNSPECIFIED EYE, UNSPECIFIED EYELID: Status: ACTIVE | Noted: 2024-02-07

## 2024-02-07 PROBLEM — M63.859: Status: ACTIVE | Noted: 2022-11-24

## 2024-02-07 PROBLEM — S30.22XA HEMATOMA OF SCROTUM: Status: ACTIVE | Noted: 2024-02-07
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
## 2024-02-07 PROBLEM — M25.579 ANKLE PAIN: Status: ACTIVE | Noted: 2023-11-27

## 2024-02-07 PROBLEM — M25.569 KNEE PAIN: Status: ACTIVE | Noted: 2023-07-07

## 2024-02-07 PROBLEM — M65.342 TRIGGER FINGER, LEFT RING FINGER: Status: ACTIVE | Noted: 2024-02-07

## 2024-02-07 PROBLEM — M21.70 LEG LENGTH DISCREPANCY: Status: ACTIVE | Noted: 2023-07-07

## 2024-02-07 PROBLEM — D50.9 IRON DEFICIENCY ANEMIA: Status: ACTIVE | Noted: 2022-12-30

## 2024-02-07 PROCEDURE — 4010F ACE/ARB THERAPY RXD/TAKEN: CPT | Performed by: INTERNAL MEDICINE

## 2024-02-07 PROCEDURE — 1036F TOBACCO NON-USER: CPT | Performed by: INTERNAL MEDICINE

## 2024-02-07 PROCEDURE — 99214 OFFICE O/P EST MOD 30 MIN: CPT | Performed by: INTERNAL MEDICINE

## 2024-02-07 PROCEDURE — 3078F DIAST BP <80 MM HG: CPT | Performed by: INTERNAL MEDICINE

## 2024-02-07 PROCEDURE — 3077F SYST BP >= 140 MM HG: CPT | Performed by: INTERNAL MEDICINE

## 2024-02-07 PROCEDURE — 93000 ELECTROCARDIOGRAM COMPLETE: CPT | Performed by: INTERNAL MEDICINE

## 2024-02-07 RX ORDER — NEEDLES, SAFETY 25GX1"
1 NEEDLE, DISPOSABLE MISCELLANEOUS SEE ADMIN INSTRUCTIONS
COMMUNITY
Start: 2023-02-02

## 2024-02-07 RX ORDER — ISOSORBIDE MONONITRATE 30 MG/1
30 TABLET, EXTENDED RELEASE ORAL DAILY
Qty: 90 TABLET | Refills: 3 | Status: SHIPPED | OUTPATIENT
Start: 2024-02-07 | End: 2024-03-28 | Stop reason: SINTOL

## 2024-02-07 ASSESSMENT — PAIN SCALES - GENERAL: PAINLEVEL: 0-NO PAIN

## 2024-02-07 ASSESSMENT — PATIENT HEALTH QUESTIONNAIRE - PHQ9
2. FEELING DOWN, DEPRESSED OR HOPELESS: NOT AT ALL
SUM OF ALL RESPONSES TO PHQ9 QUESTIONS 1 AND 2: 0
1. LITTLE INTEREST OR PLEASURE IN DOING THINGS: NOT AT ALL
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
## 2024-02-16 DIAGNOSIS — I10 PRIMARY HYPERTENSION: Primary | ICD-10-CM

## 2024-02-16 RX ORDER — LOSARTAN POTASSIUM 50 MG/1
50 TABLET ORAL 2 TIMES DAILY
Qty: 180 TABLET | Refills: 3 | Status: SHIPPED | OUTPATIENT
Start: 2024-02-16

## 2024-02-16 NOTE — TELEPHONE ENCOUNTER
Pharmacy is requesting a refill on pt behalf    Requested Prescriptions     Pending Prescriptions Disp Refills    losartan (Cozaar) 50 mg tablet [Pharmacy Med Name: LOSARTAN TABS 50MG] 180 tablet 3     Sig: TAKE 1 TABLET TWICE A DAY

## 2024-02-27 DIAGNOSIS — I10 PRIMARY HYPERTENSION: ICD-10-CM

## 2024-03-01 RX ORDER — METOPROLOL SUCCINATE 100 MG/1
100 TABLET, EXTENDED RELEASE ORAL 2 TIMES DAILY
Qty: 180 TABLET | Refills: 3 | Status: SHIPPED | OUTPATIENT
Start: 2024-03-01

## 2024-03-01 NOTE — TELEPHONE ENCOUNTER
Pharmacy requesting refill of the patients medication for a 90 day supply with refills.   Please send the attached prescription as soon as possible.   Thank you  Castro Agosto MA       Requested Prescriptions     Pending Prescriptions Disp Refills    metoprolol succinate XL (Toprol-XL) 100 mg 24 hr tablet [Pharmacy Med Name: METOPROLOL SUCCINATE ER TABS 100MG] 180 tablet 3     Sig: TAKE 1 TABLET TWICE A DAY

## 2024-03-07 DIAGNOSIS — E78.2 MIXED HYPERLIPIDEMIA: ICD-10-CM

## 2024-03-08 RX ORDER — ATORVASTATIN CALCIUM 40 MG/1
40 TABLET, FILM COATED ORAL DAILY
Qty: 90 TABLET | Refills: 3 | Status: SHIPPED | OUTPATIENT
Start: 2024-03-08

## 2024-03-08 NOTE — TELEPHONE ENCOUNTER
Pharmacy requesting refill of the patients medication for a 90 day supply with refills.   Please send the attached prescription as soon as possible.   Thank you  Castro Agosto MA     Requested Prescriptions     Pending Prescriptions Disp Refills    atorvastatin (Lipitor) 40 mg tablet [Pharmacy Med Name: ATORVASTATIN TABS 40MG] 90 tablet 3     Sig: TAKE 1 TABLET DAILY

## 2024-03-28 ENCOUNTER — OFFICE VISIT (OUTPATIENT)
Dept: CARDIOLOGY | Facility: CLINIC | Age: 44
End: 2024-03-28
Payer: COMMERCIAL

## 2024-03-28 VITALS
DIASTOLIC BLOOD PRESSURE: 72 MMHG | RESPIRATION RATE: 18 BRPM | HEART RATE: 71 BPM | OXYGEN SATURATION: 97 % | HEIGHT: 68 IN | WEIGHT: 315 LBS | TEMPERATURE: 98.6 F | BODY MASS INDEX: 47.74 KG/M2 | SYSTOLIC BLOOD PRESSURE: 114 MMHG

## 2024-03-28 DIAGNOSIS — I25.10 CORONARY ARTERY DISEASE INVOLVING NATIVE CORONARY ARTERY OF NATIVE HEART WITHOUT ANGINA PECTORIS: Primary | ICD-10-CM

## 2024-03-28 DIAGNOSIS — I31.39 PERICARDIAL EFFUSION (HHS-HCC): ICD-10-CM

## 2024-03-28 DIAGNOSIS — E78.2 MIXED HYPERLIPIDEMIA: ICD-10-CM

## 2024-03-28 DIAGNOSIS — E11.9 DIABETES MELLITUS WITHOUT COMPLICATION (MULTI): ICD-10-CM

## 2024-03-28 DIAGNOSIS — I10 PRIMARY HYPERTENSION: ICD-10-CM

## 2024-03-28 PROCEDURE — 3078F DIAST BP <80 MM HG: CPT | Performed by: INTERNAL MEDICINE

## 2024-03-28 PROCEDURE — 3074F SYST BP LT 130 MM HG: CPT | Performed by: INTERNAL MEDICINE

## 2024-03-28 PROCEDURE — 1036F TOBACCO NON-USER: CPT | Performed by: INTERNAL MEDICINE

## 2024-03-28 PROCEDURE — 99214 OFFICE O/P EST MOD 30 MIN: CPT | Performed by: INTERNAL MEDICINE

## 2024-03-28 PROCEDURE — 4010F ACE/ARB THERAPY RXD/TAKEN: CPT | Performed by: INTERNAL MEDICINE

## 2024-03-28 RX ORDER — FUROSEMIDE 40 MG/1
40 TABLET ORAL DAILY
COMMUNITY
Start: 2024-03-03 | End: 2024-06-04

## 2024-03-28 ASSESSMENT — ENCOUNTER SYMPTOMS
LOSS OF SENSATION IN FEET: 0
DEPRESSION: 0
OCCASIONAL FEELINGS OF UNSTEADINESS: 0

## 2024-03-28 ASSESSMENT — COLUMBIA-SUICIDE SEVERITY RATING SCALE - C-SSRS
6. HAVE YOU EVER DONE ANYTHING, STARTED TO DO ANYTHING, OR PREPARED TO DO ANYTHING TO END YOUR LIFE?: NO
2. HAVE YOU ACTUALLY HAD ANY THOUGHTS OF KILLING YOURSELF?: NO
1. IN THE PAST MONTH, HAVE YOU WISHED YOU WERE DEAD OR WISHED YOU COULD GO TO SLEEP AND NOT WAKE UP?: NO

## 2024-03-28 ASSESSMENT — PAIN SCALES - GENERAL: PAINLEVEL: 0-NO PAIN
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
## 2024-03-29 NOTE — PROGRESS NOTES
Verbal consent of the patient and/or verbal parental consent for patients under the age of 18 have been obtained to conduct a physical examination at this office visit.    Established patient  History Of Present Illness  04/03/24 Ned Ernandez is a 43 y.o. male who presents for follow up of BILATERAL knees. He reports significant improvement to his knee pain from before doing the injections. On rare occasion he will feel some minimal pain to the lateral aspect of his RIGHT knee. Additionally he will feel pain when ascending stairs in the LEFT knee. Overall his pain is very infrequent and minimal and so he has not needed to treat his pain with any pain management interventions. He denies any popping, catching, locking or instability of either knee. He continues to do his home exercise program that he previously learned in Physical Therapy every day. Ned also wears good supportive shoes with full foot insoles and a heel lift in his LEFT shoe as well as his compression stockings to promote good blood flow and proper body mechanics. He has also been taking Move Free and turmeric supplementation. He is extremely happy with his improvement with the injections and would like to pursue another round in the future to avoid surgical intervention so he can enjoy riding his bike in the summer.     All previous Progress Notes and imaging results related to this patients chief complaint have been reviewed in preparation for this examination.    Past Medical History  He has a past medical history of Acute myocardial infarction (CMS/[facility]) (09/13/2023), CAD (coronary artery disease) (11/24/2022), Chest pain (02/07/2024), Diabetes mellitus type 1 (CMS/[facility]), MI (myocardial infarction) (CMS/[facility]), Mixed hyperlipidemia (09/13/2023), Osteoarthritis (10/2023), Pericardial effusion (02/07/2024), Primary hypertension (09/13/2023), and Varicose veins of lower extremity with inflammation (09/13/2023).    Surgical History  He has a past  surgical history that includes Cardiac catheterization (11/2014); Arterial bypass surgry (12/2014); Coronary stent placement (08/31/2020); IR ablation vein (11/2022); and Spider vein injection (Right, 11/2022).     Social History  He reports that he has never smoked. He has never been exposed to tobacco smoke. He has never used smokeless tobacco. He reports that he does not drink alcohol and does not use drugs.    Family History  Family History   Problem Relation Name Age of Onset    No Known Problems Mother      No Known Problems Father      No Known Problems Sister      Cancer Maternal Grandmother      No Known Problems Maternal Grandfather      Heart attack Paternal Grandmother      Heart disease Paternal Grandfather          Allergies  Cat dander and House dust    Historical Clinical Intake  July 7, 2023 Verbal consent of the patient and/or verbal parental consent for patients under the age of 18 have been obtained to conduct a physical examination at this office visit. Ned is a 42 year old male who was graciously referred into the office by Dr. López who present for an initial evaluation of his BILATERAL knee pain R>L. He reports a history of RIGHT knee pain with an injury occurring at 18 years old while playing racket ball. He describes his RIGHT knee pain as a throbbing ache proximal to the posterolateral aspect of his patella that will radiate into his right Achilles tendon. He states his LEFT knee pain has gotten significantly worse since his compensating for his right knee occurs along the medial aspect which he also describes as a throbbing ache. Ned has positive crepitus with reported popping with ambulation. He denies any numbness, tingling or instability. He rates his pain at 6/10 today. He takes OTC Aleve alternating with OTC Tylenol and elevates his BLE for pain management with minimal to no relief. His PCP, Dr. López, ordered an US of his RLE which was negative for DVT prior to this examination. I  stressed to him the importance based off his medical history of having open heart surgery already that he should not be taking any non steroid anti-inflammatory drugs whatsoever he should only be taking Tylenol. We talked in detail about extra supplements that he could take. We also talked in detail about a significant amount of weight gain that he needs to start being very attentive to is very active and we talked about modifications and exercises that he can do regularly.  ------------------------  September 20, 2023 Verbal consent of the patient and/or verbal parental consent for patients under the age of 18 have been obtained to conduct a physical examination at this office visit. Ned is a newly established patient who is present here today for a reevaluation of his BILATERAL knees. Ned states that he has not noticed any significant change in symptoms; and he continues to c/o bony pain along the posterior medial aspect of both knees He is taking Curcumin/Tumeric and OTC Move Free as directed previously and is also taking Tylenol Arthritis with no change in symptoms. He is unable to take NSAIDS due to history of heart surgery. He has completed 6-8 weeks of physical therapy with no significant improvement in knee pain and is continuing therapy and performing his home exercise program. He was referred back to our office for reevaluation as he was not improving. He states that his RIGHT knee hurts more than his LEFT. Pain is currently 4/10 in right posterior lateral knee and 1/10 on the LEFT knee. Pain is worse with climbing stairs, squatting, and prolonged walking. He was referred back to office by PT because not getting any better and actuaaly getting worse.  ------------------------------------------------------  10/30/23Ned STEF Ernandez is a 43 y.o. male who presents to review his BILATERAL knee MRI which was performed at [facility]. Pt continues to have bilateral knee pain R >L. Pt reports intermittent  pain on lateral and posterior knees. Worse with standing for long periods of time and with exertion. Currently treating pain with Tylenol Arthritis, Move Free, and turmeric-curcumin supplements and ice. Pt has been wearing good supportive shoes and inserts and wears compression stockings. Pt completed physical therapy and is continuing with HEP. Pt feels pain has lessened in frequency since starting supplements and doing PT. Walking up and down stairs causes pain as he has to go one foot at a time. He is an  and sits typically all day.  He would like to start injections as soon as possible.  He says that since he is plateaued so much during physical therapy and it has not helped much she is hoping that the injections will help.  I told them are going to have to get approval for Zilretta injections since he is diabetic for his right knee for his distal IT band syndrome friction syndrome.  Also had to get approval for viscosupplementation injections as well.  We also discussed he has to talk to his cardiologist who prescribed his blood thinner to see what they want us to do for stopping before injections and keeping him off of it after injections.   -------------------------------  11/28/23 Ned Ernandez is a 43 y.o. male who presents for a corticosteroid injection into the patients BILATERAL knees. Patient continues to experience pain in both knees around all sides but the lateral and posterior side are worse. He states he constantly feels the bones of both knees popping. Pain is worse when moving from standing to sitting position and ascending stairs. Pain is also worse without shoes. He treats the pain with Tylenol when needed. He continues to wear good supportive footwear with inserts with his 4mm heel lift in LEFT shoe. He completed physical therapy but continues to participate in HEP. He also continues to take Move Free an turmeric supplements. Patient discussed stopping his Xarelto and aspirin with  cardiologist, Dr. Alexis during the course of the injections.   I told him we try to get approval for the Zilretta injections however his insurance did not allow it so he had a corticosteroid injection and they wanted to see how that affected his blood sugars.  I told him to monitor his blood sugars regularly and he may have to use some insulin.    --------------------------------------------------  12/11/23 Ned Ernandez is a 43 y.o. male who presents for Euflexxa Injection 1/3 injection into the patients BILATERAL knees. Patient reports resolution of BILATERAL knee pain until yesterday when he reports placing a stress on the outside of his RIGHT knee stepping out of a car. Patient notes 1/10 pain in the lateral aspect of his RIGHT knee that was not significant enough to take NSAIDs to resolve, but is still present this morning. Patient received corticosteroid injection to bilateral knees two weeks ago. He was denied Zilretta injections by his insurance despite being diabetic. He reports that his pain corticosteroid injection improved significantly after the injections; however, he battled high blood glucose levels. He reports that he needed to take 3-4 times the amount of normal insulin to keep his sugars between 350-400. He states that this lasted 4 days before his sugar levels returned to normal just above 100.    -------------------------------------------------------  12/27/23 Ned Ernandez is a 43 y.o. male who presents for Euflexxa Injection 2/3 injection into his BILATERAL knees. Pt states that he has had noticeable improvement since beginning injections. There is intermittent sharp mid right patellar pain and posterior pain which occurs less frequently along his mid patella. Upon walking up stairs pain is exacerbated. He is not currently using any pain management interventions at this time.   Overall he is very happy with the results of the injections that he feels are helping his knees tremendously    -------------------------------------------------------  1/3/24  Ned Enrandez is a 43 y.o. male who presents for Euflexxa Injection 3/3 injection into the patients BILATERAL Knees. He states that overall he is doing significantly better since receiving Euflexxa injections into his bilateral knees. He denies any pain, instability, locking, catching, or swelling in either knee. He continues to take Tylenol and Ibuprofen for pain management as needed and is also continuing to perform his home exercise program as previously directed.  He rates his bilateral knee pain at 0/10 today.   he says he is very happy with the results of the injections and how well his knees are feeling.  He is hoping honestly he will be able to continue on getting these injections in the future and to avoid surgical intervention.    Review of Systems:  CONSTITUTIONAL:   Negative for weight change, loss of appetite, fatigue, weakness, fever, chills, night sweats, headaches .           HEENT:   Negative for cold, cough, sore throat, sinus pain, swollen lymph nodes.           OPHTHALMOLOGY:   Negative for diminished vision, blurred vision, loss of vision, double vision.           ALLERGY:   Negative for runny nose, scratchy throat, sinus congestion, rash, facial pressure, nasal congestion, post-nasal drip.           CARDIOLOGY:   Negative for chest pain, palpitations, murmurs, irregular heart beat, shortness of breath, leg edema, dyspnea on exertion, fatigue, dizziness.           RESPIRATORY:   Negative for chest pain, shortness of breath, swelling of the legs, asthma/copd, chest congestion, pain with breathing .           GASTROENTEROLOGY:   Negative for nausea, vomitting, heartburn, constipation, diarrhea, blood in stool, change in bowel habits, black stool.           HEMATOLOGY/LYMPH:   Negative for fatigue, loss of appetitie, easy bruising, easy bleeding, anemia, abnormal bleeding, slow healing.           ENDOCRINOLOGY:   Negative for  polyuria, polydipsia, polyphagia, fatigue, weight loss, weight gain, cold intolerance, heat intolerance, diabetes.           MUSCULOSKELETAL:   Positive  for Bilateral Knee pain         DERMATOLOGY:   Negative for rash, bruising.           NEUROLOGY:   Negative for tingling, numbness, gait abnormality, paresthesias, weakness, sciatica.          General Examination:  GENERAL APPEARANCE: appears well, pleasant, and cooperative, NAD.   HEENT: normal, unremarkable.   NECK: supple.   HEART: RRR, normal S1S2.   LUNGS: clear to auscultation bilaterally.   ABDOMEN: soft, NT/ND, BS present.   EXTREMITIES: no cyanosis, clubbing.   SKIN: no rash or cellulitis.   NEUROLOGIC EXAM: awake, A&O x 3, CN's II-XII grossly intact.   PSYCH: good eye contact, appropriate mood and affect .            General (KNEE):   All findings improved tremendously since doing joint lubrication injections and physical therapy  Location:  BILATERAL  LEFT more than RIGHT  Erythema: Negative.   Edema:   Negative   Effusion: Negative.   Warmth: Negative.   Percussion Test:  Negative:  Tuning Fork Test:  Negative:   Ecchymosis/Bruising: Negative.   Abrasions: Negative.   Palpation: Negative.  Baker's Cyst: Negative.   Orientation: Symmetrical.   Range of Motion: FROM, Not Painful  Knee Flexion ( 0-135 degrees)  Knee Extension ( 0-15 degrees)  Hip Flexion (120-130 degrees)  Hip Extension (10-20 degrees)  Hip ABduction towards body (45 degrees)  Hip ADduction away from body (30 degrees)  Internal Rotation (45 degrees)          External Rotation (50 degrees).         Muscle Strength: All findings improved tremendously since doing joint lubrication injections and physical therapy  +5/+5 Quadricep Extension  +5/+5 Hamstring Flexion  +5+5 Hip Flexion  +5+5 Hip Extension  +5/+5 Hip ABduction away from body  +5/+5 Hip ADduction towards body  +5/+5 Hip Internal Rotation  +5/+5 Hip External Rotation  +5+5 IT-Band  +5+5 Gastrocnemius  +5+5 Soleus.       DTR/Neurological:   +2/+4 Patellar (L4)  +2/+4 Posterior Tibialis Reflex and Medial Hamstrings (L5)  +2/+4 Achilles (S1).            Sensation/Neurological Lumbar:    Negative, Sensation intact:  L1: Low back, hips, and groin  L2: Low back and front of inside of upper leg/thigh  L3: Low back and front of upper leg/thigh  L4: Low back, front of upper leg/thigh, front of lower leg/calf, front of medial area of knee, and inside of ankle  L5: Low back, front and lateral knee, front and outside of lower leg/calf, top and bottom of foot and first four toes especially big toe.      Sensation/Neurological Sacrum:   Negative, Sensation Intact, 2 -Point Discrimination Test: Negative  S1: low back, back of upper leg/thigh, back and inside of lower leg, calf and little toe  S2: Buttocks, genitals, back of upper leg/thigh and calves  S3: Buttocks and genitals  S4: Buttocks  S5: Buttocks.   Pain with Squat:  Positive  Pain with Sumo Squat:  Positive  Vascular: +2/+4 Femoral, +2/+4 Dorsalis Pedis, +2/+4 Posterior Tibial  Capillary Refill less than 2 seconds.     Feet/Foot: PositiveValgus Foot Deformity (Pes Planus) BILATERAL.   Leg length discrepancy:  Positive: Noted on physical examination standing erect pelvis x-ray shows minimal leg-length discrepancy.      Knee - ACL:  Anterior Drawer Test: Negative.   Lachman Test: Negative.   Prone Lachman Test: Negative.   Lelli Test: Negative.   Pivot Shift Test: Negative.   Crossover Test: Negative.      KNEE - FAT PAD:  Squeeze Test Knee Bent going into extension: Negative.      KNEE - IT BAND: All findings improved tremendously since doing joint lubrication injections and physical therapy  Belkys Test:  Negative:   Noble Test: Negative:         KNEE - MCL / LCL:All findings improved tremendously since doing joint lubrication injections and physical therapy  Valgus Stress Test:  90 degrees: Negative, 20-30 degrees: Negative.   Varus Stress Test:  90 degrees: Negative, 20-30 degrees:  Negative.   Apley Distraction Test:  Positive:, Medial:, MCL:.   Thessaly Test:  Negative        KNEE - MENISCUS: All findings improved tremendously since doing joint lubrication injections and physical therapy  Apley Compression Test:  Negative  Duck Walk Test:  Negative.   Stienmann 1 Test:  Negative.   Stienmann 2 Test:  Negative.   Porfirio Test:  Negative  Bragard's Test:  Negative.   Bounce Home Test:  Negative.   Payr Test:  Negative.   Thessaly Test:  Negative:  Yordan's Test:  Negative.         KNEE - PATELLA All findings improved tremendously since doing joint lubrication injections and physical therapy  Apprehension Test:  Negative.   Glide Test:  Negative.   Facet Tenderness Test:  Negative.   Medial Patellar Plica Test:  Negative.   Grind Test:  Negative   Patella Tracking Test:  Negative.   Medial/Lateral Subluxation Test:  Negative.   Suppression Test:  Negative.         Knee - PCL/POSTERIOR LATERAL CORNER:  Posterior Drawer Test: Negative.   Jimenes 90/90 (Sag Sign) Test: Negative.   Dial Test: Negative.   Reverse Lachman Test: Negative.   Quad Activation Test: Negative.   ER / Recurvatum Test: Negative.   Reverse Pivot Shift Test: Negative.      KNEE - POPLITEUS:  Jean Pierre's Test: Negative.      KNEE - QUADRICEPS:  Dreyer Test: Negative.   VMO Test: Negative.   VLO Test: Negative.      Hip/Pelvis - Sacrum:  Leg Length Supine:  Negative  Leg Length Supine to Seated (Derbolowsky Sign):  Negative  Standing Flexion Test:  Negative.   Seated Flexion Test:  Negative   Spring Test:  Negative   Sacral Somatic Dysfunction:  Negative   Sacroiliac (SI) Joint Fixation Test:  Negative   Hip Flexor Tightness:  Negative   Hamstring Tightness:  Positive.      Diagnostic studies:  MRI KNEE LEFT performed at [facility] on 10/13/23    IMPRESSION MRI KNEE LEFT:  Intact menisci, ligaments and tendons  Underlying degenerative changes as follows: Mild chondral thinning of the weightbearing aspect of the lateral  tibial plateau, mild thinning of the articular cartilage of the weightbearing aspects of the medial femoral condyle and tibial plateau, full thickness cartilage loss of the medial patellar facet and high-grade chondral loss of the cartilage of the lateral facet with subchondral signal change noted laterally.  Joint effusion - small suprapatellar  Diffuse subcutaneous soft tissue edema  --------------------------  MRI KNEE RIGHT performed at [facility] on 10/13/23    IMPRESSION:MRI KNEE RIGHT:    IT band syndrome as follows: Edema like signal in between the IT band and lateral femoral condyle compatible with IT band friction syndrome.  Complex tear of the posterior horn and body of the medial meniscus   Osteoarthritis of the medial patellofemoral compartments  Joint effusion - moderate sized suprapatellar  Subcutaneous soft tissue edema    ----------------------  Standing erect pelvis x-ray shows the following:  Left leg shorter than right leg at the iliac crest by 6 millimeters mm  Right leg shorter than left leg at Sacral Base by 6 millimeters mm  Left leg shorter than right leg at midline Femoral Heads by 4 mm  Overall difference left leg shorter than right leg by approximately 4 millimeters                    PROCEDURE: PELVIS 1 OR 2 VIEW - TXR 0039  REASON FOR EXAM: LEG LENGTH DISCREPANCY  RESULT: MRN: [id number] Patient Name: KENDALL HITCHCOCK  STUDY: PELVIS 1 OR 2 VIEW 7/7/2023 8:15 am  INDICATION: LEG LENGTH DISCREPANCY  COMPARISON: None available.     ACCESSION NUMBER(S): [id number]  ORDERING CLINICIAN: AUSTEN BONNER     TECHNIQUE: Erect AP view of the pelvis     FINDINGS:  Normal mineralization. The hip joint space is maintained bilaterally. No fracture, dislocation, or bony abnormality is seen. The pelvic ring is intact without fracture or disruption.     IMPRESSION PELVIS XR:  Normal erect AP view of the pelvis     Dictation workstation: [id number]  Original Interpreting Physician: ISRRAEL STOVER  M.D.  Original Transcribed by/Date: MMODAL Jul 7 2023 7:42A  ----------------------------------------------------  PROCEDURE: KNEE BI MIN 4 VIEW - TXR 0220  REASON FOR EXAM: BILAT KNEE PAIN  RESULT: MRN: [id number] Patient Name: KENDALL HITCHCOCK  STUDY: KNEE BI MIN 4 VIEW 7/7/2023 8:15 am  INDICATION: BILAT KNEE PAIN  COMPARISON: None available.     ACCESSION NUMBER(S): [id number]  ORDERING CLINICIAN: AUSTEN BONNER     TECHNIQUE: Four views of each knee are completed with 3 of the images bilaterally performed in the erect position.     FINDINGS:  On the LEFT, there is narrowing of the medial femorotibial compartment as well as the patellofemoral compartment with mild osteophytosis of the patellar articular surface and a tiny spur projecting from medial tibial plateau. There is minimal suprapatellar effusion.     On the RIGHT, there are surgical clips along the posteromedial aspect of the distal portion of the right upper leg and right knee. The medial compartment is narrowed with small osteophytes projecting from the medial tibial plateau. Small spurs also arise from the patellar articular surface. The patellofemoral compartment is also narrowed.     IMPRESSION BILATERAL KNEE XR:  There is narrowing of the patellofemoral and medial compartments of each knee with the medial compartmental narrowing more pronounced on the right than on the left. There is mild osteophytosis of each knee observed as well.     Dictation workstation: [id number]  Original Interpreting Physician: ISRRAEL STOVER M.D.  Original Transcribed by/Date: [facility] Jul 7 2023 7:42A  ----------------------------------------------------  PROCEDURE: LOWER LEG RT NON VASC LIMITED - WUS 2647  REASON FOR EXAM: PAIN IN RIGHT KNEE  RESULT: MRN: [id number] Patient Name: KENDALL HITCHCOCK  STUDY:LOWER LEG RT NON VASC LIMITED; 6/16/2023 3:49 pm  INDICATION:PAIN IN RIGHT KNEE posteriorly.  COMPARISON:None     ACCESSION NUMBER(S):[id number]  ORDERING CLINICIAN:CHEKO WALSH      TECHNIQUE: Using a combination of gray scale ultrasound, spectral waveform analysis, compression technique, pulsed and color Doppler, the deep venous system of the right popliteal fossa was examined.                            FINDINGS:  No cystic or solid lesions are visualized in the right popliteal fossa; specifically no Baker's cyst is identified.  The popliteal vein is compressible and displays normal flow signal.  There is no evidence of deep venous thrombosis.     IMPRESSION RLE US:  Normal ultrasound of the right popliteal fossa.     Dictation workstation: [id number]  Original Interpreting Physician: ABBY PALMA M.D.  Original Transcribed by/Date: MMODAL Jun 16 2023 3:04P        Assessment   1. Patellar tendinitis of right knee        2. Acute pain of left knee        3. Primary osteoarthritis of left knee      degenerative changes of the lateral tibial plateau, articular cartilage of the medial femoral condyle and tibial plateau, medial patellar and lateral facet      4. Effusion of left knee      MRI shows: Joint effusion - small suprapatellar      5. Patellofemoral disorders, left knee        6. Effusion of right knee      MRI shows the following: Joint effusion - moderate sized suprapatellar      7. Acute pain of right knee        8. Primary osteoarthritis of right knee      MRI shows: Osteoarthritis of the medial patellofemoral compartments      9. Acute medial meniscal injury of right knee, subsequent encounter      MRI shows: Complex tear of the posterior horn and body of the medial meniscus      10. Patellofemoral disorders, right knee        11. Iliotibial band syndrome of right side      MRI shows: IT band syndrome as follows: Edema like signal in between the IT band and lateral femoral condyle compatible with IT band friction syndrome.      12. Patellar tendinitis, right knee        13. Hamstring tightness        14. Leg length discrepancy        15. Valgus deformity of both feet        16.  Disorder of patellofemoral joint, unspecified laterality        17. Disorders of muscle in diseases classd elswhr, unsp thigh            Treatment or Intervention:  May continue to alternate ice and moist heat as needed  Once again, reviewed degenerative joint disease of the knees, patellofemoral tracking syndrome, and/or patellar tendinitis as well as meniscus injury in detail with the patient to the level of their understanding  Continue  exercises learned in Physical Therapy daily   Once again, reviewed home exercises to be performed by the patient routinely  Once again stressed the importance of  continuing to wear shoes with good stability control to help with the biomechanics affecting the knees and lower extremities  Once again, stressed the importance of  continuing to wear full foot insoles to help with the biomechanics affecting the knees and lower extremities  Once again, recommendation to continue over-the-counter vitamin-D 2 -3000+ milligrams a day, as well as a daily multivitamin.  Continue to  take over-the-counter curcumin, turmeric, boswellia, as well as egg shell membrane as directed to aid with joint inflammation.  Continue to take over-the-counter Move Free for joint health.  Once again patient advised regarding the risks and/or potential adverse reactions and/or side effects of any prescribed medications along with any over-the-counter medications or any supplements used. Patient advised to seek immediate medical care if any adverse reactions occur. The patient and/or patient(s) parent(s) verbalized their understanding  Once again Discussed in detail with the patient to the level of their understanding the possibility in the future of regenerative injections versus corticosteroid injections versus  another round of viscosupplementation injections versus a combination  Once again, talked in detail about exercise modifications and how to avoid repetitive overuse his knees.  Once again, talked  about different diets as well as intermittent fasting  Continue to wear 4 millimeter heel lift placed into his left shoe to accommodate leg-length discrepancy found on standing erect pelvis x-ray   staff to initiate approval for joint lubrication injections at 3-month follow-up  Follow-up in 3 months for BILATERAL knees or for more injections or sooner if needed.  Please note that this report has been produced using speech recognition software.  It may contain errors related to grammar, punctuation or spelling.  Electronically signed, but not reviewed.  KTAE Lamb, Director of Sports Medicine       AUSTEN BONNER on 4/3/24 at 3:18 PM.     RIO Lamb DO

## 2024-04-03 ENCOUNTER — OFFICE VISIT (OUTPATIENT)
Dept: SPORTS MEDICINE | Facility: CLINIC | Age: 44
End: 2024-04-03
Payer: COMMERCIAL

## 2024-04-03 VITALS
HEART RATE: 64 BPM | SYSTOLIC BLOOD PRESSURE: 102 MMHG | BODY MASS INDEX: 47.74 KG/M2 | DIASTOLIC BLOOD PRESSURE: 64 MMHG | HEIGHT: 68 IN | WEIGHT: 315 LBS

## 2024-04-03 DIAGNOSIS — M22.2X1 PATELLOFEMORAL DISORDERS, RIGHT KNEE: ICD-10-CM

## 2024-04-03 DIAGNOSIS — M76.51 PATELLAR TENDINITIS OF RIGHT KNEE: Primary | ICD-10-CM

## 2024-04-03 DIAGNOSIS — M25.462 EFFUSION OF LEFT KNEE: ICD-10-CM

## 2024-04-03 DIAGNOSIS — M76.31 ILIOTIBIAL BAND SYNDROME OF RIGHT SIDE: ICD-10-CM

## 2024-04-03 DIAGNOSIS — S83.8X1D ACUTE MEDIAL MENISCAL INJURY OF RIGHT KNEE, SUBSEQUENT ENCOUNTER: ICD-10-CM

## 2024-04-03 DIAGNOSIS — M62.89 HAMSTRING TIGHTNESS: ICD-10-CM

## 2024-04-03 DIAGNOSIS — M21.70 LEG LENGTH DISCREPANCY: ICD-10-CM

## 2024-04-03 DIAGNOSIS — M25.562 ACUTE PAIN OF LEFT KNEE: ICD-10-CM

## 2024-04-03 DIAGNOSIS — M22.2X9 DISORDER OF PATELLOFEMORAL JOINT, UNSPECIFIED LATERALITY: ICD-10-CM

## 2024-04-03 DIAGNOSIS — M17.12 PRIMARY OSTEOARTHRITIS OF LEFT KNEE: ICD-10-CM

## 2024-04-03 DIAGNOSIS — M22.2X2 PATELLOFEMORAL DISORDERS, LEFT KNEE: ICD-10-CM

## 2024-04-03 DIAGNOSIS — M63.859: ICD-10-CM

## 2024-04-03 DIAGNOSIS — Q66.6 VALGUS DEFORMITY OF BOTH FEET: ICD-10-CM

## 2024-04-03 DIAGNOSIS — M17.11 PRIMARY OSTEOARTHRITIS OF RIGHT KNEE: ICD-10-CM

## 2024-04-03 DIAGNOSIS — M25.461 EFFUSION OF RIGHT KNEE: ICD-10-CM

## 2024-04-03 DIAGNOSIS — M25.561 ACUTE PAIN OF RIGHT KNEE: ICD-10-CM

## 2024-04-03 DIAGNOSIS — M76.51 PATELLAR TENDINITIS, RIGHT KNEE: ICD-10-CM

## 2024-04-03 PROBLEM — M17.0 TRICOMPARTMENT OSTEOARTHRITIS OF BOTH KNEES: Status: ACTIVE | Noted: 2024-04-03

## 2024-04-03 PROBLEM — S83.8X1A ACUTE MEDIAL MENISCAL INJURY OF RIGHT KNEE: Status: ACTIVE | Noted: 2024-04-03

## 2024-04-03 PROCEDURE — 99214 OFFICE O/P EST MOD 30 MIN: CPT | Performed by: FAMILY MEDICINE

## 2024-04-03 PROCEDURE — 1036F TOBACCO NON-USER: CPT | Performed by: FAMILY MEDICINE

## 2024-04-03 PROCEDURE — 4010F ACE/ARB THERAPY RXD/TAKEN: CPT | Performed by: FAMILY MEDICINE

## 2024-04-03 PROCEDURE — 3074F SYST BP LT 130 MM HG: CPT | Performed by: FAMILY MEDICINE

## 2024-04-03 PROCEDURE — 3078F DIAST BP <80 MM HG: CPT | Performed by: FAMILY MEDICINE

## 2024-04-03 ASSESSMENT — PATIENT HEALTH QUESTIONNAIRE - PHQ9
SUM OF ALL RESPONSES TO PHQ9 QUESTIONS 1 AND 2: 0
1. LITTLE INTEREST OR PLEASURE IN DOING THINGS: NOT AT ALL
2. FEELING DOWN, DEPRESSED OR HOPELESS: NOT AT ALL

## 2024-04-03 ASSESSMENT — PAIN SCALES - GENERAL: PAINLEVEL: 0-NO PAIN

## 2024-04-03 ASSESSMENT — ENCOUNTER SYMPTOMS
OCCASIONAL FEELINGS OF UNSTEADINESS: 0
DEPRESSION: 0
LOSS OF SENSATION IN FEET: 0

## 2024-04-03 ASSESSMENT — PAIN - FUNCTIONAL ASSESSMENT: PAIN_FUNCTIONAL_ASSESSMENT: NO/DENIES PAIN

## 2024-04-12 DIAGNOSIS — I25.10 CORONARY ARTERY DISEASE INVOLVING NATIVE HEART, UNSPECIFIED VESSEL OR LESION TYPE, UNSPECIFIED WHETHER ANGINA PRESENT: ICD-10-CM

## 2024-04-15 RX ORDER — DILTIAZEM HYDROCHLORIDE 120 MG/1
120 TABLET, FILM COATED ORAL 2 TIMES DAILY
Qty: 180 TABLET | Refills: 3 | Status: SHIPPED | OUTPATIENT
Start: 2024-04-15 | End: 2025-04-10

## 2024-05-07 RX ORDER — TORSEMIDE 100 MG/1
100 TABLET ORAL DAILY
COMMUNITY
Start: 2022-05-06

## 2024-05-07 RX ORDER — FERROUS SULFATE 325(65) MG
325 TABLET ORAL EVERY 24 HOURS
COMMUNITY
Start: 2023-02-06

## 2024-05-07 RX ORDER — TADALAFIL 20 MG/1
20 TABLET ORAL DAILY PRN
COMMUNITY
Start: 2022-11-11

## 2024-05-08 ENCOUNTER — LAB (OUTPATIENT)
Dept: LAB | Facility: LAB | Age: 44
End: 2024-05-08
Payer: COMMERCIAL

## 2024-05-08 DIAGNOSIS — E11.9 DIABETES MELLITUS WITHOUT COMPLICATION (MULTI): ICD-10-CM

## 2024-05-08 LAB
ALBUMIN SERPL-MCNC: 3.9 G/DL (ref 3.5–5)
ALP BLD-CCNC: 93 U/L (ref 35–125)
ALT SERPL-CCNC: 13 U/L (ref 5–40)
ANION GAP SERPL CALC-SCNC: 14 MMOL/L
AST SERPL-CCNC: 16 U/L (ref 5–40)
BASOPHILS # BLD AUTO: 0.04 X10*3/UL (ref 0–0.1)
BASOPHILS NFR BLD AUTO: 0.5 %
BILIRUB SERPL-MCNC: 0.5 MG/DL (ref 0.1–1.2)
BUN SERPL-MCNC: 18 MG/DL (ref 8–25)
CALCIUM SERPL-MCNC: 9.2 MG/DL (ref 8.5–10.4)
CHLORIDE SERPL-SCNC: 101 MMOL/L (ref 97–107)
CHOLEST SERPL-MCNC: 116 MG/DL (ref 133–200)
CHOLEST/HDLC SERPL: 2.3 {RATIO}
CO2 SERPL-SCNC: 23 MMOL/L (ref 24–31)
CREAT SERPL-MCNC: 0.9 MG/DL (ref 0.4–1.6)
CREAT UR-MCNC: 241.7 MG/DL
EGFRCR SERPLBLD CKD-EPI 2021: >90 ML/MIN/1.73M*2
EOSINOPHIL # BLD AUTO: 0.14 X10*3/UL (ref 0–0.7)
EOSINOPHIL NFR BLD AUTO: 1.7 %
ERYTHROCYTE [DISTWIDTH] IN BLOOD BY AUTOMATED COUNT: 13.7 % (ref 11.5–14.5)
EST. AVERAGE GLUCOSE BLD GHB EST-MCNC: 183 MG/DL
GLUCOSE SERPL-MCNC: 213 MG/DL (ref 65–99)
HBA1C MFR BLD: 8 %
HCT VFR BLD AUTO: 44.5 % (ref 41–52)
HDLC SERPL-MCNC: 51 MG/DL
HGB BLD-MCNC: 13.8 G/DL (ref 13.5–17.5)
IMM GRANULOCYTES # BLD AUTO: 0.03 X10*3/UL (ref 0–0.7)
IMM GRANULOCYTES NFR BLD AUTO: 0.4 % (ref 0–0.9)
LDLC SERPL CALC-MCNC: 54 MG/DL (ref 65–130)
LYMPHOCYTES # BLD AUTO: 2.05 X10*3/UL (ref 1.2–4.8)
LYMPHOCYTES NFR BLD AUTO: 25 %
MCH RBC QN AUTO: 27.4 PG (ref 26–34)
MCHC RBC AUTO-ENTMCNC: 31 G/DL (ref 32–36)
MCV RBC AUTO: 89 FL (ref 80–100)
MICROALBUMIN UR-MCNC: <12 MG/L (ref 0–23)
MICROALBUMIN/CREAT UR: NORMAL MG/G{CREAT}
MONOCYTES # BLD AUTO: 0.64 X10*3/UL (ref 0.1–1)
MONOCYTES NFR BLD AUTO: 7.8 %
NEUTROPHILS # BLD AUTO: 5.31 X10*3/UL (ref 1.2–7.7)
NEUTROPHILS NFR BLD AUTO: 64.6 %
NRBC BLD-RTO: 0 /100 WBCS (ref 0–0)
PLATELET # BLD AUTO: 269 X10*3/UL (ref 150–450)
POTASSIUM SERPL-SCNC: 4.7 MMOL/L (ref 3.4–5.1)
PROT SERPL-MCNC: 6.6 G/DL (ref 5.9–7.9)
RBC # BLD AUTO: 5.03 X10*6/UL (ref 4.5–5.9)
SODIUM SERPL-SCNC: 138 MMOL/L (ref 133–145)
TRIGL SERPL-MCNC: 55 MG/DL (ref 40–150)
TSH SERPL DL<=0.05 MIU/L-ACNC: 3.88 MIU/L (ref 0.27–4.2)
URATE SERPL-MCNC: 4.7 MG/DL (ref 3.6–7.7)
VIT B12 SERPL-MCNC: 849 PG/ML (ref 211–946)
WBC # BLD AUTO: 8.2 X10*3/UL (ref 4.4–11.3)

## 2024-05-08 PROCEDURE — 82607 VITAMIN B-12: CPT

## 2024-05-08 PROCEDURE — 82043 UR ALBUMIN QUANTITATIVE: CPT

## 2024-05-08 PROCEDURE — 80053 COMPREHEN METABOLIC PANEL: CPT

## 2024-05-08 PROCEDURE — 80061 LIPID PANEL: CPT

## 2024-05-08 PROCEDURE — 85025 COMPLETE CBC W/AUTO DIFF WBC: CPT

## 2024-05-08 PROCEDURE — 82570 ASSAY OF URINE CREATININE: CPT

## 2024-05-08 PROCEDURE — 84443 ASSAY THYROID STIM HORMONE: CPT

## 2024-05-08 PROCEDURE — 84550 ASSAY OF BLOOD/URIC ACID: CPT

## 2024-05-08 PROCEDURE — 83036 HEMOGLOBIN GLYCOSYLATED A1C: CPT

## 2024-05-08 PROCEDURE — 36415 COLL VENOUS BLD VENIPUNCTURE: CPT

## 2024-05-10 ENCOUNTER — OFFICE VISIT (OUTPATIENT)
Dept: PRIMARY CARE | Facility: CLINIC | Age: 44
End: 2024-05-10
Payer: COMMERCIAL

## 2024-05-10 VITALS
BODY MASS INDEX: 47.74 KG/M2 | HEIGHT: 68 IN | DIASTOLIC BLOOD PRESSURE: 68 MMHG | WEIGHT: 315 LBS | TEMPERATURE: 98.6 F | SYSTOLIC BLOOD PRESSURE: 118 MMHG | HEART RATE: 72 BPM | OXYGEN SATURATION: 96 %

## 2024-05-10 DIAGNOSIS — E11.65 TYPE 2 DIABETES MELLITUS WITH HYPERGLYCEMIA, WITH LONG-TERM CURRENT USE OF INSULIN (MULTI): Primary | ICD-10-CM

## 2024-05-10 DIAGNOSIS — Z79.4 TYPE 2 DIABETES MELLITUS WITH HYPERGLYCEMIA, WITH LONG-TERM CURRENT USE OF INSULIN (MULTI): Primary | ICD-10-CM

## 2024-05-10 PROCEDURE — 3074F SYST BP LT 130 MM HG: CPT | Performed by: FAMILY MEDICINE

## 2024-05-10 PROCEDURE — 3078F DIAST BP <80 MM HG: CPT | Performed by: FAMILY MEDICINE

## 2024-05-10 PROCEDURE — 99213 OFFICE O/P EST LOW 20 MIN: CPT | Performed by: FAMILY MEDICINE

## 2024-05-10 PROCEDURE — 3052F HG A1C>EQUAL 8.0%<EQUAL 9.0%: CPT | Performed by: FAMILY MEDICINE

## 2024-05-10 PROCEDURE — 3062F POS MACROALBUMINURIA REV: CPT | Performed by: FAMILY MEDICINE

## 2024-05-10 PROCEDURE — 4010F ACE/ARB THERAPY RXD/TAKEN: CPT | Performed by: FAMILY MEDICINE

## 2024-05-10 PROCEDURE — 3048F LDL-C <100 MG/DL: CPT | Performed by: FAMILY MEDICINE

## 2024-05-10 RX ORDER — INSULIN DEGLUDEC 100 U/ML
50 INJECTION, SOLUTION SUBCUTANEOUS 2 TIMES DAILY
Qty: 90 ML | Refills: 3 | Status: SHIPPED | OUTPATIENT
Start: 2024-05-10 | End: 2024-05-10

## 2024-05-10 RX ORDER — INSULIN DEGLUDEC 100 U/ML
50 INJECTION, SOLUTION SUBCUTANEOUS 2 TIMES DAILY
Qty: 90 ML | Refills: 3 | Status: SHIPPED | OUTPATIENT
Start: 2024-05-10

## 2024-05-10 ASSESSMENT — PATIENT HEALTH QUESTIONNAIRE - PHQ9
SUM OF ALL RESPONSES TO PHQ9 QUESTIONS 1 & 2: 0
2. FEELING DOWN, DEPRESSED OR HOPELESS: NOT AT ALL
1. LITTLE INTEREST OR PLEASURE IN DOING THINGS: NOT AT ALL

## 2024-05-10 ASSESSMENT — ENCOUNTER SYMPTOMS
NAUSEA: 0
DIFFICULTY URINATING: 0
DIZZINESS: 0
SHORTNESS OF BREATH: 0
FEVER: 0
ENDOCRINE NEGATIVE: 1
DIARRHEA: 0

## 2024-05-10 ASSESSMENT — PAIN SCALES - GENERAL: PAINLEVEL: 0-NO PAIN

## 2024-05-10 NOTE — PROGRESS NOTES
"Subjective   Patient ID: Ned Ernandez is a 43 y.o. male who presents for Diabetes, lab results, and jury duty note.    DM-chronic on meds    Diabetes Mellitus  Patient presents for follow up of diabetes. Current symptoms include: none. Symptoms have been intermittent. Patient denies chest pain . Evaluation to date has included: hemoglobin A1C--8.0 in 5/2024.  Home sugars: fluctuate. Current treatment: insulin. Last dilated eye exam: June 2023, scheduled for 7/1/24.      Diabetes  Pertinent negatives for hypoglycemia include no dizziness. Pertinent negatives for diabetes include no chest pain.        Review of Systems   Constitutional:  Negative for fever.        Also see HPI   Eyes:  Negative for visual disturbance.   Respiratory:  Negative for shortness of breath.    Cardiovascular:  Negative for chest pain.   Gastrointestinal:  Negative for diarrhea and nausea.   Endocrine: Negative.    Genitourinary:  Negative for difficulty urinating.   Skin:  Negative for rash.   Neurological:  Negative for dizziness.        No focal deficits   Psychiatric/Behavioral:  Negative for suicidal ideas.    All other systems reviewed and are negative.      Objective   /68   Pulse 72   Temp 37 °C (98.6 °F)   Ht 1.727 m (5' 8\")   Wt (!) 187 kg (412 lb)   SpO2 96%   BMI 62.64 kg/m²     Physical Exam  Vitals and nursing note reviewed.   Constitutional:       Appearance: Normal appearance.   HENT:      Head: Normocephalic and atraumatic.   Eyes:      Extraocular Movements: Extraocular movements intact.      Conjunctiva/sclera: Conjunctivae normal.   Cardiovascular:      Rate and Rhythm: Normal rate and regular rhythm.      Heart sounds: Normal heart sounds.   Pulmonary:      Effort: Pulmonary effort is normal.      Breath sounds: Normal breath sounds.      Comments: Lungs essentially CTA b/l  Abdominal:      General: There is no distension.      Palpations: Abdomen is soft. There is no mass.      Tenderness: There is no " abdominal tenderness.   Musculoskeletal:      Right lower leg: No edema.      Left lower leg: No edema.   Skin:     Coloration: Skin is not jaundiced.      Findings: No rash.   Neurological:      General: No focal deficit present.      Mental Status: He is alert and oriented to person, place, and time.   Psychiatric:         Mood and Affect: Mood normal.         Behavior: Behavior normal.         Thought Content: Thought content normal.         Judgment: Judgment normal.       Assessment/Plan   Diagnoses and all orders for this visit:  Type 2 diabetes mellitus with hyperglycemia, with long-term current use of insulin (Multi)  -     insulin degludec (Tresiba U-100 Insulin) 100 unit/mL injection; Inject 50 Units under the skin 2 times a day.

## 2024-05-10 NOTE — LETTER
Ned Ernandez  1980    5/10/2024    To Whom This May Concern:    My patient, Ned Ernandez, is unable to perform Jury Duty due to a mental or physical condition that renders the prospective juror unfit for jury service for the remainder of the jury year.    Should you have any questions please do not hesitate to call.    Thank you for your cooperation.    Sincerely,    Elmer López, DO

## 2024-05-15 DIAGNOSIS — I25.10 CAD (CORONARY ARTERY DISEASE): Primary | ICD-10-CM

## 2024-05-15 NOTE — TELEPHONE ENCOUNTER
Pharmacy is requesting a refill on behalf of the           Requested Prescriptions     Pending Prescriptions Disp Refills    Xarelto 2.5 mg tablet [Pharmacy Med Name: XARELTO TABS 2.5MG] 180 tablet 3     Sig: TAKE 1 TABLET TWICE A DAY

## 2024-05-16 RX ORDER — RIVAROXABAN 2.5 MG/1
2.5 TABLET, FILM COATED ORAL 2 TIMES DAILY
Qty: 180 TABLET | Refills: 3 | Status: SHIPPED | OUTPATIENT
Start: 2024-05-16

## 2024-05-24 ENCOUNTER — TELEPHONE (OUTPATIENT)
Dept: CARDIOLOGY | Facility: CLINIC | Age: 44
End: 2024-05-24
Payer: COMMERCIAL

## 2024-06-03 DIAGNOSIS — I25.10 CORONARY ARTERY DISEASE INVOLVING NATIVE CORONARY ARTERY OF NATIVE HEART, UNSPECIFIED WHETHER ANGINA PRESENT: ICD-10-CM

## 2024-06-03 DIAGNOSIS — I10 PRIMARY HYPERTENSION: ICD-10-CM

## 2024-06-04 RX ORDER — FUROSEMIDE 40 MG/1
40 TABLET ORAL DAILY
Qty: 90 TABLET | Refills: 3 | Status: SHIPPED | OUTPATIENT
Start: 2024-06-04 | End: 2025-05-30

## 2024-06-04 NOTE — TELEPHONE ENCOUNTER
Pharmacy is requesting a refill on behalf of pt       Requested Prescriptions     Pending Prescriptions Disp Refills    furosemide (Lasix) 40 mg tablet [Pharmacy Med Name: FUROSEMIDE TABS 40MG] 90 tablet 3     Sig: Take 1 tablet (40 mg) by mouth once daily.

## 2024-06-05 ENCOUNTER — TELEPHONE (OUTPATIENT)
Dept: SPORTS MEDICINE | Facility: CLINIC | Age: 44
End: 2024-06-05
Payer: COMMERCIAL

## 2024-06-06 ENCOUNTER — TELEPHONE (OUTPATIENT)
Dept: SPORTS MEDICINE | Facility: CLINIC | Age: 44
End: 2024-06-06
Payer: COMMERCIAL

## 2024-06-06 NOTE — TELEPHONE ENCOUNTER
06/06/2024- Initiated benefits investigation via [employer] for Viscosupplementation Injections into the patients B/L KNEES     Patient was AUTO APPROVED for EUFLEXXA Injections into his B/L KNEES.   1x a week for 3 weeks.    Patient has appointment on Monday 06/10/2024 and he can receive Euflexxa #1 as scheduled. Please call and inform patient of approval and see if he would like to schedule his remaining 2 appointments.    Auth: [id number]  Dates: 06/06/2024-12/02/2024    Waiting on FAXED Approval Letter then will scan into patients chart.

## 2024-06-07 DIAGNOSIS — I10 PRIMARY HYPERTENSION: ICD-10-CM

## 2024-06-07 DIAGNOSIS — I21.9 ACUTE MYOCARDIAL INFARCTION, UNSPECIFIED MI TYPE, UNSPECIFIED ARTERY (MULTI): ICD-10-CM

## 2024-06-10 ENCOUNTER — OFFICE VISIT (OUTPATIENT)
Dept: SPORTS MEDICINE | Facility: CLINIC | Age: 44
End: 2024-06-10
Payer: COMMERCIAL

## 2024-06-10 VITALS
HEIGHT: 68 IN | SYSTOLIC BLOOD PRESSURE: 128 MMHG | DIASTOLIC BLOOD PRESSURE: 82 MMHG | BODY MASS INDEX: 47.74 KG/M2 | HEART RATE: 70 BPM | WEIGHT: 315 LBS

## 2024-06-10 DIAGNOSIS — S83.8X1D ACUTE MEDIAL MENISCAL INJURY OF RIGHT KNEE, SUBSEQUENT ENCOUNTER: ICD-10-CM

## 2024-06-10 DIAGNOSIS — M17.12 PRIMARY OSTEOARTHRITIS OF LEFT KNEE: ICD-10-CM

## 2024-06-10 DIAGNOSIS — M22.2X1 PATELLOFEMORAL DISORDERS, RIGHT KNEE: ICD-10-CM

## 2024-06-10 DIAGNOSIS — M62.89 HAMSTRING TIGHTNESS: ICD-10-CM

## 2024-06-10 DIAGNOSIS — M76.52 PATELLAR TENDINITIS, LEFT KNEE: ICD-10-CM

## 2024-06-10 DIAGNOSIS — M25.462 EFFUSION OF LEFT KNEE: ICD-10-CM

## 2024-06-10 DIAGNOSIS — M76.31 ILIOTIBIAL BAND SYNDROME OF RIGHT SIDE: ICD-10-CM

## 2024-06-10 DIAGNOSIS — M76.32 ILIOTIBIAL BAND SYNDROME OF LEFT SIDE: ICD-10-CM

## 2024-06-10 DIAGNOSIS — M22.2X2 PATELLOFEMORAL DISORDERS, LEFT KNEE: ICD-10-CM

## 2024-06-10 DIAGNOSIS — S83.8X2D ACUTE MEDIAL MENISCAL INJURY OF LEFT KNEE, SUBSEQUENT ENCOUNTER: ICD-10-CM

## 2024-06-10 DIAGNOSIS — M21.70 LEG LENGTH DISCREPANCY: ICD-10-CM

## 2024-06-10 DIAGNOSIS — M25.562 ACUTE PAIN OF LEFT KNEE: ICD-10-CM

## 2024-06-10 DIAGNOSIS — M25.461 EFFUSION OF RIGHT KNEE: ICD-10-CM

## 2024-06-10 DIAGNOSIS — M17.11 PRIMARY OSTEOARTHRITIS OF RIGHT KNEE: ICD-10-CM

## 2024-06-10 DIAGNOSIS — M25.561 ACUTE PAIN OF RIGHT KNEE: ICD-10-CM

## 2024-06-10 DIAGNOSIS — M17.0 PRIMARY OSTEOARTHRITIS OF BOTH KNEES: ICD-10-CM

## 2024-06-10 DIAGNOSIS — Q66.6 VALGUS DEFORMITY OF BOTH FEET: ICD-10-CM

## 2024-06-10 DIAGNOSIS — M76.51 PATELLAR TENDINITIS, RIGHT KNEE: ICD-10-CM

## 2024-06-10 PROCEDURE — 2500000004 HC RX 250 GENERAL PHARMACY W/ HCPCS (ALT 636 FOR OP/ED): Mod: JZ | Performed by: FAMILY MEDICINE

## 2024-06-10 PROCEDURE — 99214 OFFICE O/P EST MOD 30 MIN: CPT | Mod: 50 | Performed by: FAMILY MEDICINE

## 2024-06-10 RX ADMIN — Medication 2 ML: at 17:41

## 2024-06-10 ASSESSMENT — ENCOUNTER SYMPTOMS
LOSS OF SENSATION IN FEET: 0
OCCASIONAL FEELINGS OF UNSTEADINESS: 0
DEPRESSION: 0

## 2024-06-10 ASSESSMENT — PATIENT HEALTH QUESTIONNAIRE - PHQ9
SUM OF ALL RESPONSES TO PHQ9 QUESTIONS 1 AND 2: 0
2. FEELING DOWN, DEPRESSED OR HOPELESS: NOT AT ALL
1. LITTLE INTEREST OR PLEASURE IN DOING THINGS: NOT AT ALL

## 2024-06-10 ASSESSMENT — PAIN - FUNCTIONAL ASSESSMENT: PAIN_FUNCTIONAL_ASSESSMENT: NO/DENIES PAIN

## 2024-06-10 ASSESSMENT — PAIN SCALES - GENERAL: PAINLEVEL: 0-NO PAIN

## 2024-06-10 ASSESSMENT — LIFESTYLE VARIABLES
HOW MANY STANDARD DRINKS CONTAINING ALCOHOL DO YOU HAVE ON A TYPICAL DAY: PATIENT DOES NOT DRINK
HOW OFTEN DO YOU HAVE A DRINK CONTAINING ALCOHOL: NEVER

## 2024-06-10 NOTE — PATIENT INSTRUCTIONS
The following discharge instructions were reviewed in detail with the patient to the level of their understanding:    PAIN:  A mild to moderate amount of discomfort, tenderness, stiffness, and achiness-caused by the inflammation of the area can be expected for a few days after the injection. This is normal and good as the inflammation is an important part of the healing process.    SKIN: Due to the numbing spray used, you may develop a red, itchy, scaly rash in the area that was sprayed. Should your skin become itchy, wash the area with mild soap and warm water. If needed, you may apply topical over-the-counter Hydrocortisone cream to alleviate any itchiness. AVOID scratching due to risk of infection.,     BATHING/SWIMMING: You may shower after your procedure with regular soap and water, but no baths, no swimming, and no hot tubs for 3-4 days after the procedure.,     ACTIVITIES:  Immediately following the injection, you may resume daily activities (such as work) and light exercise. We suggest that you refrain from strenuous activity and heavy lifting, particularly the injured body part, for a week post-procedure, but sometimes this can change as well.    MOIST HEAT/ICE:  Moist heat may be used on the injection area. NO ICE.  MEDICATION: You may continue your prescribed medications and any over-the-counter supplements; however, it is not recommended to use aspirin or anti-inflammatories (Motrin, Advil, Aleve, Ibuprofen, Naproxen etc.) for up to 2 weeks post procedure. Tylenol Extra Strength, Tylenol Arthritis and/or any prescribed narcotics or muscle relaxants are OK to take.    APPOINTMENTS: You should have a follow-up appointment with Dr. Woodruff scheduled 1-3 weeks as recommended after your procedure for your next round of injections or to follow-up after you have completed your injection series. Please schedule your follow-up appointment on your way out after your procedure, or call the office to schedule these  appointments as soon as possible.    PRECAUTIONS: Smoking, caffeine, alcohol, sex and anti-inflammatories post-procedure may reduce the effectiveness of Prolotherapy/Neural Prolotherapy/Prolozone injections. Early, rare post procedure problems that can be concerning are as follows: an increase in pain not relieved by medication (over the counter or prescription), a temperature above 101 degrees, bleeding or progressive, extreme swelling, or numbness.     CALL THE OFFICE OR GO TO THE EMERGENCY ROOM IF ANY OF THESE SYMPTOMS OCCUR.   If you have any questions or problems, please call our office at [phone]       Treatment or Intervention:  May continue to alternate ice and moist heat as needed  Once again, reviewed degenerative joint disease of the knees, patellofemoral tracking syndrome, and/or patellar tendinitis as well as meniscus injury in detail with the patient to the level of their understanding  Stressed the importance of continuing to perform home exercises previously learned in Physical Therapy daily   Once again, reviewed home exercises to be performed by the patient routinely  Once again stressed the importance of  continuing to wear shoes with good stability control to help with the biomechanics affecting the knees and lower extremities  Once again, stressed the importance of  continuing to wear full foot insoles to help with the biomechanics affecting the knees and lower extremities  Once again, recommendation to continue over-the-counter vitamin-D 2 -3000+ milligrams a day, as well as a daily multivitamin.  May continue to take over-the-counter curcumin, turmeric, boswellia, as well as egg shell membrane as directed to aid with joint inflammation.  May continue to take over-the-counter Move Free for joint health.  Patient advised regarding the risks and/or potential adverse reactions and/or side effects of any prescribed medications along with any over-the-counter medications or any supplements  used. Patient advised to seek immediate medical care if any adverse reactions occur. The patient and/or patient(s) parent(s) verbalized their understanding  Once again, discussed in detail with the patient to the level of their understanding the possibility in the future of regenerative injections versus corticosteroid injections versus  another round of viscosupplementation injections versus a combination  Once again, reviewed in detail about exercise modifications and how to avoid repetitive overuse his knees.  Once again, reviewed different dietary modifications, including intermittent fasting to aid with weight management  Stressed the importance of continuing to wear 4 millimeter heel lift placed into his left shoe to accommodate leg-length discrepancy found on standing erect pelvis x-ray  Performed Euflexxa injection #1 (NS) into the patients BILATERAL knees under ultrasound. The patient tolerated the procedure well and without any adverse effects. Discharge instructions for VISCOSUPPLEMENTATION injections were reviewed in detail with the patient to the level of their understanding; a copy of these instructions were provided to the patient at the time of this office visit.  Follow-up next week for Euflexxa #2 injection into the patients BILATERAL knees or sooner as needed

## 2024-06-10 NOTE — PROGRESS NOTES
Verbal consent of the patient and/or verbal parental consent for patients under the age of 18 have been obtained to conduct a physical examination at this office visit.    Established patient  History Of Present Illness  06/11/24 Ned Ernandez is a 43 y.o. male who presents for Euflexxa #1 (NS) injection into his BILATERAL knees. He continues to perform his home strengthening and stretching exercises previously learned in Physical Therapy. He reports approximately x1-2 weeks following his last evaluation onset of sharp pains along the inferolateral aspect of his LEFT > Right patella, most notable when ascending stairs. He cites living in a home where he is unable to avoid use of stairs on a daily basis. He denies any numbness, tingling or radiculopathy at this time. He denies pain at rest, but says when present pain can reach 5/10. He is not utilizing any pain management interventions at this time. He wears bilateral compression stockings, full foot insoles, appropriate heel lift in his LEFT shoe and supportive footwear as previously recommended.  he still is happy with the injections and will like to try to avoid surgical intervention long-term wise.    All previous Progress Notes and imaging results related to this patients chief complaint have been reviewed in preparation for this examination.    Past Medical History  He has a past medical history of Acute myocardial infarction (Multi) (09/13/2023), CAD (coronary artery disease) (11/24/2022), Chest pain (02/07/2024), Diabetes mellitus type 1 (Multi), MI (myocardial infarction) (Multi), Mixed hyperlipidemia (09/13/2023), Osteoarthritis (10/2023), Pericardial effusion (HHS-HCC) (02/07/2024), Primary hypertension (09/13/2023), and Varicose veins of lower extremity with inflammation (09/13/2023).    Surgical History  He has a past surgical history that includes Cardiac catheterization (11/2014); Arterial bypass surgry (12/2014); Coronary stent placement  (08/31/2020); IR ablation vein (11/2022); and Spider vein injection (Right, 11/2022).     Social History  He reports that he has never smoked. He has never been exposed to tobacco smoke. He has never used smokeless tobacco. He reports that he does not drink alcohol and does not use drugs.    Family History  Family History   Problem Relation Name Age of Onset    No Known Problems Mother      No Known Problems Father      No Known Problems Sister      No Known Problems Son      Cancer Maternal Grandmother      No Known Problems Maternal Grandfather      Heart attack Paternal Grandmother      Heart disease Paternal Grandfather          Allergies  Cat dander and House dust    Historical Clinical Intake  July 7, 2023 Verbal consent of the patient and/or verbal parental consent for patients under the age of 18 have been obtained to conduct a physical examination at this office visit. Ned is a 42 year old male who was graciously referred into the office by Dr. López who present for an initial evaluation of his BILATERAL knee pain R>L. He reports a history of RIGHT knee pain with an injury occurring at 18 years old while playing racket ball. He describes his RIGHT knee pain as a throbbing ache proximal to the posterolateral aspect of his patella that will radiate into his right Achilles tendon. He states his LEFT knee pain has gotten significantly worse since his compensating for his right knee occurs along the medial aspect which he also describes as a throbbing ache. Ned has positive crepitus with reported popping with ambulation. He denies any numbness, tingling or instability. He rates his pain at 6/10 today. He takes OTC Aleve alternating with OTC Tylenol and elevates his BLE for pain management with minimal to no relief. His PCP, Dr. López, ordered an US of his RLE which was negative for DVT prior to this examination. I stressed to him the importance based off his medical history of having open heart surgery already  that he should not be taking any non steroid anti-inflammatory drugs whatsoever he should only be taking Tylenol. We talked in detail about extra supplements that he could take. We also talked in detail about a significant amount of weight gain that he needs to start being very attentive to is very active and we talked about modifications and exercises that he can do regularly.  ------------------------  September 20, 2023 Verbal consent of the patient and/or verbal parental consent for patients under the age of 18 have been obtained to conduct a physical examination at this office visit. Ned is a newly established patient who is present here today for a reevaluation of his BILATERAL knees. Ned states that he has not noticed any significant change in symptoms; and he continues to c/o bony pain along the posterior medial aspect of both knees He is taking Curcumin/Tumeric and OTC Move Free as directed previously and is also taking Tylenol Arthritis with no change in symptoms. He is unable to take NSAIDS due to history of heart surgery. He has completed 6-8 weeks of physical therapy with no significant improvement in knee pain and is continuing therapy and performing his home exercise program. He was referred back to our office for reevaluation as he was not improving. He states that his RIGHT knee hurts more than his LEFT. Pain is currently 4/10 in right posterior lateral knee and 1/10 on the LEFT knee. Pain is worse with climbing stairs, squatting, and prolonged walking. He was referred back to office by PT because not getting any better and actuaaly getting worse.  ------------------------------------------------------  10/30/23Ned STEF Ernandez is a 43 y.o. male who presents to review his BILATERAL knee MRI which was performed at [facility]. Pt continues to have bilateral knee pain R >L. Pt reports intermittent pain on lateral and posterior knees. Worse with standing for long periods of time and with  exertion. Currently treating pain with Tylenol Arthritis, Move Free, and turmeric-curcumin supplements and ice. Pt has been wearing good supportive shoes and inserts and wears compression stockings. Pt completed physical therapy and is continuing with HEP. Pt feels pain has lessened in frequency since starting supplements and doing PT. Walking up and down stairs causes pain as he has to go one foot at a time. He is an  and sits typically all day.  He would like to start injections as soon as possible.  He says that since he is plateaued so much during physical therapy and it has not helped much she is hoping that the injections will help.  I told them are going to have to get approval for Zilretta injections since he is diabetic for his right knee for his distal IT band syndrome friction syndrome.  Also had to get approval for viscosupplementation injections as well.  We also discussed he has to talk to his cardiologist who prescribed his blood thinner to see what they want us to do for stopping before injections and keeping him off of it after injections.   -------------------------------  11/28/23 Ned Ernandez is a 43 y.o. male who presents for a corticosteroid injection into the patients BILATERAL knees. Patient continues to experience pain in both knees around all sides but the lateral and posterior side are worse. He states he constantly feels the bones of both knees popping. Pain is worse when moving from standing to sitting position and ascending stairs. Pain is also worse without shoes. He treats the pain with Tylenol when needed. He continues to wear good supportive footwear with inserts with his 4mm heel lift in LEFT shoe. He completed physical therapy but continues to participate in HEP. He also continues to take Move Free an turmeric supplements. Patient discussed stopping his Xarelto and aspirin with cardiologist, Dr. Alexis during the course of the injections.   I told him we try to get  approval for the Zilretta injections however his insurance did not allow it so he had a corticosteroid injection and they wanted to see how that affected his blood sugars.  I told him to monitor his blood sugars regularly and he may have to use some insulin.    --------------------------------------------------  12/11/23 Ned Ernandez is a 43 y.o. male who presents for Euflexxa Injection 1/3 injection into the patients BILATERAL knees. Patient reports resolution of BILATERAL knee pain until yesterday when he reports placing a stress on the outside of his RIGHT knee stepping out of a car. Patient notes 1/10 pain in the lateral aspect of his RIGHT knee that was not significant enough to take NSAIDs to resolve, but is still present this morning. Patient received corticosteroid injection to bilateral knees two weeks ago. He was denied Zilretta injections by his insurance despite being diabetic. He reports that his pain corticosteroid injection improved significantly after the injections; however, he battled high blood glucose levels. He reports that he needed to take 3-4 times the amount of normal insulin to keep his sugars between 350-400. He states that this lasted 4 days before his sugar levels returned to normal just above 100.    -------------------------------------------------------  12/27/23 Ned Ernandez is a 43 y.o. male who presents for Euflexxa Injection 2/3 injection into his BILATERAL knees. Pt states that he has had noticeable improvement since beginning injections. There is intermittent sharp mid right patellar pain and posterior pain which occurs less frequently along his mid patella. Upon walking up stairs pain is exacerbated. He is not currently using any pain management interventions at this time.   Overall he is very happy with the results of the injections that he feels are helping his knees tremendously   -------------------------------------------------------  1/3/24  Ned Ernandez is a 43  y.o. male who presents for Euflexxa Injection 3/3 injection into the patients BILATERAL Knees. He states that overall he is doing significantly better since receiving Euflexxa injections into his bilateral knees. He denies any pain, instability, locking, catching, or swelling in either knee. He continues to take Tylenol and Ibuprofen for pain management as needed and is also continuing to perform his home exercise program as previously directed.  He rates his bilateral knee pain at 0/10 today.   he says he is very happy with the results of the injections and how well his knees are feeling.  He is hoping honestly he will be able to continue on getting these injections in the future and to avoid surgical intervention.  -------------------------------------------------------  04/03/24 Ned Ernandez is a 43 y.o. male who presents for follow up of BILATERAL knees. He reports significant improvement to his knee pain from before doing the injections. On rare occasion he will feel some minimal pain to the lateral aspect of his RIGHT knee. Additionally he will feel pain when ascending stairs in the LEFT knee. Overall his pain is very infrequent and minimal and so he has not needed to treat his pain with any pain management interventions. He denies any popping, catching, locking or instability of either knee. He continues to do his home exercise program that he previously learned in Physical Therapy every day. Ned also wears good supportive shoes with full foot insoles and a heel lift in his LEFT shoe as well as his compression stockings to promote good blood flow and proper body mechanics. He has also been taking Move Free and turmeric supplementation. He is extremely happy with his improvement with the injections and would like to pursue another round in the future to avoid surgical intervention so he can enjoy riding his bike in the summer.       Review of Systems:  CONSTITUTIONAL:   Negative for weight change, loss of  appetite, fatigue, weakness, fever, chills, night sweats, headaches .           HEENT:   Negative for cold, cough, sore throat, sinus pain, swollen lymph nodes.           OPHTHALMOLOGY:   Negative for diminished vision, blurred vision, loss of vision, double vision.           ALLERGY:   Negative for runny nose, scratchy throat, sinus congestion, rash, facial pressure, nasal congestion, post-nasal drip.           CARDIOLOGY:   Negative for chest pain, palpitations, murmurs, irregular heart beat, shortness of breath, leg edema, dyspnea on exertion, fatigue, dizziness.           RESPIRATORY:   Negative for chest pain, shortness of breath, swelling of the legs, asthma/copd, chest congestion, pain with breathing .           GASTROENTEROLOGY:   Negative for nausea, vomitting, heartburn, constipation, diarrhea, blood in stool, change in bowel habits, black stool.           HEMATOLOGY/LYMPH:   Negative for fatigue, loss of appetitie, easy bruising, easy bleeding, anemia, abnormal bleeding, slow healing.           ENDOCRINOLOGY:   Negative for polyuria, polydipsia, polyphagia, fatigue, weight loss, weight gain, cold intolerance, heat intolerance, diabetes.           MUSCULOSKELETAL:   Positive  for Bilateral Knee pain         DERMATOLOGY:   Negative for rash, bruising.           NEUROLOGY:   Negative for tingling, numbness, gait abnormality, paresthesias, weakness, sciatica.          General Examination:  GENERAL APPEARANCE: appears well, pleasant, and cooperative, NAD.   HEENT: normal, unremarkable.   NECK: supple.   HEART: RRR, normal S1S2.   LUNGS: clear to auscultation bilaterally.   ABDOMEN: soft, NT/ND, BS present.   EXTREMITIES: no cyanosis, clubbing.   SKIN: no rash or cellulitis.   NEUROLOGIC EXAM: awake, A&O x 3, CN's II-XII grossly intact.   PSYCH: good eye contact, appropriate mood and affect .            General (KNEE):  improved overall since last set of viscosupplementation injections  Location:  BILATERAL   LEFT > RIGHT  Erythema: Negative.   Edema:   Negative   Effusion: Negative.   Warmth: Negative.   Percussion Test:  Negative:  Tuning Fork Test:  Negative:   Ecchymosis/Bruising: Negative.   Abrasions: Negative.   Palpation: Negative.  Baker's Cyst: Negative.   Orientation: Symmetrical.   Range of Motion: FROM, Not Painful  Knee Flexion ( 0-135 degrees)  Knee Extension ( 0-15 degrees)  Hip Flexion (120-130 degrees)  Hip Extension (10-20 degrees)  Hip ABduction towards body (45 degrees)  Hip ADduction away from body (30 degrees)  Internal Rotation (45 degrees)          External Rotation (50 degrees).         Muscle Strength:   improved overall since last set of viscosupplementation injections  +5/+5 Quadricep Extension  +5/+5 Hamstring Flexion  +5+5 Hip Flexion  +5+5 Hip Extension  +5/+5 Hip ABduction away from body  +5/+5 Hip ADduction towards body  +5/+5 Hip Internal Rotation  +5/+5 Hip External Rotation  +5+5 IT-Band  +5+5 Gastrocnemius  +5+5 Soleus.      DTR/Neurological:   +2/+4 Patellar (L4)  +2/+4 Posterior Tibialis Reflex and Medial Hamstrings (L5)  +2/+4 Achilles (S1).            Sensation/Neurological Lumbar:    Negative, Sensation intact:  L1: Low back, hips, and groin  L2: Low back and front of inside of upper leg/thigh  L3: Low back and front of upper leg/thigh  L4: Low back, front of upper leg/thigh, front of lower leg/calf, front of medial area of knee, and inside of ankle  L5: Low back, front and lateral knee, front and outside of lower leg/calf, top and bottom of foot and first four toes especially big toe.      Sensation/Neurological Sacrum:   Negative, Sensation Intact, 2 -Point Discrimination Test: Negative  S1: low back, back of upper leg/thigh, back and inside of lower leg, calf and little toe  S2: Buttocks, genitals, back of upper leg/thigh and calves  S3: Buttocks and genitals  S4: Buttocks  S5: Buttocks.       Pain with Squat:  Positive improved overall since last set of viscosupplementation  injections  Pain with Sumo Squat:  Positive improved overall since last set of viscosupplementation injections  Vascular: +2/+4 Femoral, +2/+4 Dorsalis Pedis, +2/+4 Posterior Tibial  Capillary Refill less than 2 seconds.     Leg length discrepancy:  Positive: LEFT leg shorter than the Right by approximately 4 mm verified with standing erect pelvis x-ray   Feet/Foot: Valgus Foot Deformity (Pes Planus) Positive  BILATERAL.      Knee - ACL:  Anterior Drawer Test: Negative.   Lachman Test: Negative.   Prone Lachman Test: Negative.   Lelli Test: Negative.   Pivot Shift Test: Negative.   Crossover Test: Negative.      KNEE - FAT PAD:  Squeeze Test Knee Bent going into extension: Negative.      KNEE - IT BAND:   Belkys Test:  Negative:   Noble Test: Negative:         KNEE - MCL / LCL:   Valgus Stress Test:  90 degrees: Negative, 20-30 degrees: Negative.   Varus Stress Test:  90 degrees: Negative, 20-30 degrees: Negative.   Apley Distraction Test:  Positive:, Medial:, MCL:.   Thessaly Test:  Negative        KNEE - MENISCUS:   improved overall since last set of viscosupplementation injections  Apley Compression Test:  Negative  Duck Walk Test:  Negative.   Stienmann 1 Test:  Negative.   Stienmann 2 Test:  Negative.   Porfirio Test:  Negative  Bragard's Test:  Negative.   Bounce Home Test:  Negative.   Payr Test:  Negative.   Thessaly Test:  Negative:  Yordan's Test:  Negative.         KNEE - PATELLA: improved overall since last set of viscosupplementation injections  Apprehension Test:   Positive.  Glide Test:  Negative.   Facet Tenderness Test:   Positive..   Medial Patellar Plica Test:  Negative.   Grind Test:   Positive.  Patella Tracking Test Positive.  Medial/Lateral Subluxation Test:  Negative.   Suppression Test:  Negative.         Knee - PCL/POSTERIOR LATERAL CORNER:  Posterior Drawer Test: Negative.   Jimenes 90/90 (Sag Sign) Test: Negative.   Dial Test: Negative.   Reverse Lachman Test: Negative.   Quad Activation  Test: Negative.   ER / Recurvatum Test: Negative.   Reverse Pivot Shift Test: Negative.      KNEE - POPLITEUS:  Jean Pierre's Test: Negative.      KNEE - QUADRICEPS:  Dreyer Test: Negative.   VMO Test: Negative.   VLO Test: Negative.      Hip/Pelvis - Sacrum:  Leg Length Supine:  Negative  Leg Length Supine to Seated (Derbolowsky Sign):  Negative  Standing Flexion Test:  Negative.   Seated Flexion Test:  Negative   Spring Test:  Negative   Sacral Somatic Dysfunction:  Negative   Sacroiliac (SI) Joint Fixation Test:  Negative   Hip Flexor Tightness:  Negative   Hamstring Tightness:  Positive.      Diagnostic studies:  MRI KNEE LEFT performed at [facility] on 10/13/23    IMPRESSION MRI KNEE LEFT:  Intact menisci, ligaments and tendons  Underlying degenerative changes as follows: Mild chondral thinning of the weightbearing aspect of the lateral tibial plateau, mild thinning of the articular cartilage of the weightbearing aspects of the medial femoral condyle and tibial plateau, full thickness cartilage loss of the medial patellar facet and high-grade chondral loss of the cartilage of the lateral facet with subchondral signal change noted laterally.  Joint effusion - small suprapatellar  Diffuse subcutaneous soft tissue edema  --------------------------  MRI KNEE RIGHT performed at [facility] on 10/13/23    IMPRESSION:MRI KNEE RIGHT:    IT band syndrome as follows: Edema like signal in between the IT band and lateral femoral condyle compatible with IT band friction syndrome.  Complex tear of the posterior horn and body of the medial meniscus   Osteoarthritis of the medial patellofemoral compartments  Joint effusion - moderate sized suprapatellar  Subcutaneous soft tissue edema    ----------------------  Standing erect pelvis x-ray shows the following:  Left leg shorter than right leg at the iliac crest by 6 millimeters mm  Right leg shorter than left leg at Sacral Base by 6 millimeters mm  Left leg shorter  than right leg at midline Femoral Heads by 4 mm  Overall difference left leg shorter than right leg by approximately 4 millimeters                    PROCEDURE: PELVIS 1 OR 2 VIEW - TXR 0039  STUDY: PELVIS 1 OR 2 VIEW 7/7/2023 8:15 am     IMPRESSION PELVIS XR:  Normal erect AP view of the pelvis     Original Interpreting Physician: ISRRAEL STOVER M.D.  Original Transcribed by/Date: MMODAL Jul 7 2023 7:42A  ----------------------------------------------------  PROCEDURE: KNEE BI MIN 4 VIEW - TXR 0220  STUDY: KNEE BI MIN 4 VIEW 7/7/2023 8:15 am     IMPRESSION BILATERAL KNEE XR:  There is narrowing of the patellofemoral and medial compartments of each knee with the medial compartmental narrowing more pronounced on the right than on the left. There is mild osteophytosis of each knee observed as well.     Original Interpreting Physician: ISRRAEL STOVER M.D.  Original Transcribed by/Date: MMODAL Jul 7 2023 7:42A  ----------------------------------------------------  PROCEDURE: LOWER LEG RT NON VASC LIMITED - [facility] 2647  STUDY:LOWER LEG RT NON VASC LIMITED; 6/16/2023 3:49 pm     IMPRESSION RLE US:  Normal ultrasound of the right popliteal fossa.     Original Interpreting Physician: ABBY PALMA M.D.  Original Transcribed by/Date: MMODAL Jun 16 2023 3:04P        Assessment   1. Acute pain of left knee  Point of Care Ultrasound      2. Primary osteoarthritis of left knee  Point of Care Ultrasound      3. Effusion of left knee  Point of Care Ultrasound      4. Patellofemoral disorders, left knee  Point of Care Ultrasound      5. Acute medial meniscal injury of left knee, subsequent encounter  Point of Care Ultrasound      6. Patellar tendinitis, left knee  Point of Care Ultrasound      7. Iliotibial band syndrome of left side  Point of Care Ultrasound      8. Acute pain of right knee  Point of Care Ultrasound      9. Effusion of right knee  Point of Care Ultrasound      10. Primary osteoarthritis of right knee  Point of Care  Ultrasound      11. Acute medial meniscal injury of right knee, subsequent encounter  Point of Care Ultrasound      12. Patellofemoral disorders, right knee  Point of Care Ultrasound      13. Patellar tendinitis, right knee  Point of Care Ultrasound      14. Iliotibial band syndrome of right side        15. Hamstring tightness        16. Leg length discrepancy        17. Valgus deformity of both feet        18. Primary osteoarthritis of both knees          L Inj/Asp: bilateral knee on 6/10/2024 5:41 PM  Indications: pain and diagnostic evaluation  Details: 22 G needle, ultrasound-guided  Medications (Right): 2 mL sodium hyaluronate 10 mg/mL(mw 2.4 -3.6 million)  Medications (Left): 2 mL sodium hyaluronate 10 mg/mL(mw 2.4 -3.6 million)  Outcome: tolerated well, no immediate complications  Procedure, treatment alternatives, risks and benefits explained, specific risks discussed. Consent was given by the patient. Immediately prior to procedure a time out was called to verify the correct patient, procedure, equipment, support staff and site/side marked as required.            Procedure   Time Out (5 Minutes): Yes  Patient Name: Ned Ernandez  YOB: 1980  Procedure: Euflexxa Injection 1/3  Needed Supplies Available: Correct Supplies  Laterality: BILATERAL knee  Site Verified and Marked: Correct Site(s) Marked  Timeout Performed by Provider: Dr. Jair Woodruff, D.O.  Staff Initials: ALZ    Patient is informed and consent has been signed by the patient if over 18 years old., Patient parent and/or legal guardian is informed and consent has been signed., Patient and/or parent and/or legal guardian accept all risks, benefits, and possible complications associated with procedure(s) and/or manipulation(s) and/or injection(s)., Patient and/or parent and/or legal guardian deny patient allergy or known complications with any of the medications, instruments, products, and/or techniques used., All questions and  concerns were answered and/or addressed with the patient and/or parent and/or legal guardian., The patient and/or parent and/or legal guardian agree to proceed with the procedure(s) and/or manipulation(s) and/or injection(s)., Prep: The area was cleansed with a mixture of equal parts rubbing alcohol 70% and Hibclens., Utilizing clean technique, the injection site(s) were marked with a skin marker., and Injection site(s) were anesthestized with topical analgesic spray prior to injection.    Performed regenerative Euflexxa Injection 1/3 into the patients BILATERAL knee, under ultrasound.  Euflexxa (1%)    Band-aid and/or compressive bandage was placed over the injection site(s). After the injection(s) performed osteopathic manipulation therapy to the affected area(s) to help increase blood flow to aid with the healing process as well as circulation of the medication. The patient tolerated the procedure well without any complications. The patient was instructed to gently massage the treatment site(s) as well as to apply moist heat to the affected area(s). Additionally, the patient was instructed to contact the office immediately with any complications or concerns.    The Nurse, Silvana, was present in the room during the entire procedure.    The following discharge instructions were reviewed in detail with the patient to the level of their understanding:    PAIN:  A mild to moderate amount of discomfort, tenderness, stiffness, and achiness-caused by the inflammation of the area can be expected for a few days after the injection. This is normal and good as the inflammation is an important part of the healing process.    SKIN: Due to the numbing spray used, you may develop a red, itchy, scaly rash in the area that was sprayed. Should your skin become itchy, wash the area with mild soap and warm water. If needed, you may apply topical over-the-counter Hydrocortisone cream to alleviate any itchiness. AVOID scratching due to  risk of infection.,     BATHING/SWIMMING: You may shower after your procedure with regular soap and water, but no baths, no swimming, and no hot tubs for 3-4 days after the procedure.,     ACTIVITIES:  Immediately following the injection, you may resume daily activities (such as work) and light exercise. We suggest that you refrain from strenuous activity and heavy lifting, particularly the injured body part, for a week post-procedure, but sometimes this can change as well.    MOIST HEAT/ICE:  Moist heat may be used on the injection area. NO ICE.  MEDICATION: You may continue your prescribed medications and any over-the-counter supplements; however, it is not recommended to use aspirin or anti-inflammatories (Motrin, Advil, Aleve, Ibuprofen, Naproxen etc.) for up to 2 weeks post procedure. Tylenol Extra Strength, Tylenol Arthritis and/or any prescribed narcotics or muscle relaxants are OK to take.    APPOINTMENTS: You should have a follow-up appointment with Dr. Woodruff scheduled 1-3 weeks as recommended after your procedure for your next round of injections or to follow-up after you have completed your injection series. Please schedule your follow-up appointment on your way out after your procedure, or call the office to schedule these appointments as soon as possible.    PRECAUTIONS: Smoking, caffeine, alcohol, sex and anti-inflammatories post-procedure may reduce the effectiveness of Prolotherapy/Neural Prolotherapy/Prolozone injections. Early, rare post procedure problems that can be concerning are as follows: an increase in pain not relieved by medication (over the counter or prescription), a temperature above 101 degrees, bleeding or progressive, extreme swelling, or numbness.     CALL THE OFFICE OR GO TO THE EMERGENCY ROOM IF ANY OF THESE SYMPTOMS OCCUR.   If you have any questions or problems, please call our office at [phone]       Treatment or Intervention:  May continue to alternate ice and moist heat  as needed  Once again, reviewed degenerative joint disease of the knees, patellofemoral tracking syndrome, and/or patellar tendinitis as well as meniscus injury in detail with the patient to the level of their understanding  Stressed the importance of continuing to perform home exercises previously learned in Physical Therapy daily   Once again, reviewed home exercises to be performed by the patient routinely  Once again stressed the importance of  continuing to wear shoes with good stability control to help with the biomechanics affecting the knees and lower extremities  Once again, stressed the importance of  continuing to wear full foot insoles to help with the biomechanics affecting the knees and lower extremities  Once again, recommendation to continue over-the-counter vitamin-D 2 -3000+ milligrams a day, as well as a daily multivitamin.  May continue to take over-the-counter curcumin, turmeric, boswellia, as well as egg shell membrane as directed to aid with joint inflammation.  May continue to take over-the-counter Move Free for joint health.  Patient advised regarding the risks and/or potential adverse reactions and/or side effects of any prescribed medications along with any over-the-counter medications or any supplements used. Patient advised to seek immediate medical care if any adverse reactions occur. The patient and/or patient(s) parent(s) verbalized their understanding  Once again, discussed in detail with the patient to the level of their understanding the possibility in the future of regenerative injections versus corticosteroid injections versus  another round of viscosupplementation injections versus a combination  Once again, reviewed in detail about exercise modifications and how to avoid repetitive overuse his knees.  Once again, reviewed different dietary modifications, including intermittent fasting to aid with weight management  Stressed the importance of continuing to wear 4 millimeter heel  lift placed into his left shoe to accommodate leg-length discrepancy found on standing erect pelvis x-ray  Performed Euflexxa injection #1 (NS) into the patients BILATERAL knees under ultrasound. The patient tolerated the procedure well and without any adverse effects. Discharge instructions for VISCOSUPPLEMENTATION injections were reviewed in detail with the patient to the level of their understanding; a copy of these instructions were provided to the patient at the time of this office visit.  Follow-up next week for Euflexxa #2 injection into the patients BILATERAL knees or sooner as needed    Please note that this report has been produced using speech recognition software.  It may contain errors related to grammar, punctuation or spelling.  Electronically signed, but not reviewed.  KATE Lamb, Director of Sports Medicine       AUSTEN BONNER on 6/11/24 at 8:46 AM.     RIO Lamb DO     injected bilateral knees with viscosupplementation injections of Euflexxa

## 2024-06-11 ENCOUNTER — HOSPITAL ENCOUNTER (OUTPATIENT)
Dept: RADIOLOGY | Facility: EXTERNAL LOCATION | Age: 44
Discharge: HOME | End: 2024-06-11

## 2024-06-11 NOTE — TELEPHONE ENCOUNTER
Pharmacy is requesting a refill on behalf of the pt       Requested Prescriptions     Pending Prescriptions Disp Refills    ranolazine (Ranexa) 500 mg 12 hr tablet [Pharmacy Med Name: RANOLAZINE ER TABS 500MG] 180 tablet 3     Sig: Take 1 tablet (500 mg) by mouth 2 times a day.

## 2024-06-12 RX ORDER — RANOLAZINE 500 MG/1
500 TABLET, EXTENDED RELEASE ORAL 2 TIMES DAILY
Qty: 180 TABLET | Refills: 3 | Status: SHIPPED | OUTPATIENT
Start: 2024-06-12 | End: 2025-06-07

## 2024-06-19 NOTE — PROGRESS NOTES
"Subjective   Patient ID: Ned Ernandez is a 43 y.o. male who presents for Diabetes, lab results, and jury duty note.    DM-chronic on meds    Diabetes         Review of Systems    Objective   /68   Pulse 72   Temp 37 °C (98.6 °F)   Ht 1.727 m (5' 8\")   Wt (!) 187 kg (412 lb)   SpO2 96%   BMI 62.64 kg/m²     Physical Exam    Assessment/Plan   Diagnoses and all orders for this visit:  Type 2 diabetes mellitus with hyperglycemia, with long-term current use of insulin (Multi)  -     insulin degludec (Tresiba U-100 Insulin) 100 unit/mL injection; Inject 50 Units under the skin 2 times a day.         "

## 2024-06-21 ENCOUNTER — HOSPITAL ENCOUNTER (OUTPATIENT)
Dept: RADIOLOGY | Facility: EXTERNAL LOCATION | Age: 44
Discharge: HOME | End: 2024-06-21

## 2024-06-21 ENCOUNTER — OFFICE VISIT (OUTPATIENT)
Dept: SPORTS MEDICINE | Facility: CLINIC | Age: 44
End: 2024-06-21
Payer: COMMERCIAL

## 2024-06-21 VITALS
SYSTOLIC BLOOD PRESSURE: 126 MMHG | HEIGHT: 68 IN | HEART RATE: 72 BPM | BODY MASS INDEX: 47.74 KG/M2 | DIASTOLIC BLOOD PRESSURE: 88 MMHG | WEIGHT: 315 LBS

## 2024-06-21 DIAGNOSIS — S83.8X1D ACUTE MEDIAL MENISCAL INJURY OF RIGHT KNEE, SUBSEQUENT ENCOUNTER: ICD-10-CM

## 2024-06-21 DIAGNOSIS — M22.2X1 PATELLOFEMORAL DISORDERS, RIGHT KNEE: ICD-10-CM

## 2024-06-21 DIAGNOSIS — M76.32 ILIOTIBIAL BAND SYNDROME OF LEFT SIDE: ICD-10-CM

## 2024-06-21 DIAGNOSIS — M76.51 PATELLAR TENDINITIS, RIGHT KNEE: ICD-10-CM

## 2024-06-21 DIAGNOSIS — M76.52 PATELLAR TENDINITIS, LEFT KNEE: ICD-10-CM

## 2024-06-21 DIAGNOSIS — Q66.6 VALGUS DEFORMITY OF BOTH FEET: ICD-10-CM

## 2024-06-21 DIAGNOSIS — M25.561 ACUTE PAIN OF RIGHT KNEE: ICD-10-CM

## 2024-06-21 DIAGNOSIS — M22.2X2 PATELLOFEMORAL DISORDERS, LEFT KNEE: ICD-10-CM

## 2024-06-21 DIAGNOSIS — M62.89 HAMSTRING TIGHTNESS: ICD-10-CM

## 2024-06-21 DIAGNOSIS — M17.12 PRIMARY OSTEOARTHRITIS OF LEFT KNEE: ICD-10-CM

## 2024-06-21 DIAGNOSIS — M76.31 ILIOTIBIAL BAND SYNDROME OF RIGHT SIDE: ICD-10-CM

## 2024-06-21 DIAGNOSIS — M21.70 LEG LENGTH DISCREPANCY: ICD-10-CM

## 2024-06-21 DIAGNOSIS — S83.8X2D ACUTE MEDIAL MENISCAL INJURY OF LEFT KNEE, SUBSEQUENT ENCOUNTER: ICD-10-CM

## 2024-06-21 DIAGNOSIS — M17.11 PRIMARY OSTEOARTHRITIS OF RIGHT KNEE: ICD-10-CM

## 2024-06-21 DIAGNOSIS — M25.562 ACUTE PAIN OF LEFT KNEE: ICD-10-CM

## 2024-06-21 DIAGNOSIS — M25.462 EFFUSION OF LEFT KNEE: ICD-10-CM

## 2024-06-21 DIAGNOSIS — M25.461 EFFUSION OF RIGHT KNEE: ICD-10-CM

## 2024-06-21 PROCEDURE — 99214 OFFICE O/P EST MOD 30 MIN: CPT | Mod: 50 | Performed by: FAMILY MEDICINE

## 2024-06-21 PROCEDURE — 3074F SYST BP LT 130 MM HG: CPT | Performed by: FAMILY MEDICINE

## 2024-06-21 PROCEDURE — 2500000004 HC RX 250 GENERAL PHARMACY W/ HCPCS (ALT 636 FOR OP/ED): Mod: JZ | Performed by: FAMILY MEDICINE

## 2024-06-21 PROCEDURE — 4010F ACE/ARB THERAPY RXD/TAKEN: CPT | Performed by: FAMILY MEDICINE

## 2024-06-21 PROCEDURE — 3052F HG A1C>EQUAL 8.0%<EQUAL 9.0%: CPT | Performed by: FAMILY MEDICINE

## 2024-06-21 PROCEDURE — 20611 DRAIN/INJ JOINT/BURSA W/US: CPT | Performed by: FAMILY MEDICINE

## 2024-06-21 PROCEDURE — 3062F POS MACROALBUMINURIA REV: CPT | Performed by: FAMILY MEDICINE

## 2024-06-21 PROCEDURE — 3079F DIAST BP 80-89 MM HG: CPT | Performed by: FAMILY MEDICINE

## 2024-06-21 PROCEDURE — 3048F LDL-C <100 MG/DL: CPT | Performed by: FAMILY MEDICINE

## 2024-06-21 ASSESSMENT — PATIENT HEALTH QUESTIONNAIRE - PHQ9
2. FEELING DOWN, DEPRESSED OR HOPELESS: NOT AT ALL
1. LITTLE INTEREST OR PLEASURE IN DOING THINGS: NOT AT ALL
SUM OF ALL RESPONSES TO PHQ9 QUESTIONS 1 AND 2: 0

## 2024-06-21 ASSESSMENT — LIFESTYLE VARIABLES: HOW MANY STANDARD DRINKS CONTAINING ALCOHOL DO YOU HAVE ON A TYPICAL DAY: PATIENT DOES NOT DRINK

## 2024-06-21 ASSESSMENT — ENCOUNTER SYMPTOMS
LOSS OF SENSATION IN FEET: 0
DEPRESSION: 0
OCCASIONAL FEELINGS OF UNSTEADINESS: 0

## 2024-06-21 ASSESSMENT — PAIN SCALES - GENERAL: PAINLEVEL: 1

## 2024-06-21 NOTE — PROGRESS NOTES
Verbal consent of the patient and/or verbal parental consent for patients under the age of 18 have been obtained to conduct a physical examination at this office visit.    Established patient  History Of Present Illness  06/21/24 Ned Ernandez is a 43 y.o. male who presents for Euflexxa #2 injection into his BILATERAL knees. He performs his home strengthening and stretching exercises previously learned in Physical Therapy. He describes his pain as an ache along the inferolateral aspect of both patella LEFT > Right. He notes ascending stairs is when pain is at its greatest. He denies any instability, locking, or catching at this time. He rates his pain at 1/10 today. He continues to wear bilateral compression sleeves to both lower extremities, as well as supportive footwear, full foot insoles and heel lift in his left shoe as directed. He continues to note improvement in pain and functionality overall since starting this series of injections.    All previous Progress Notes and imaging results related to this patients chief complaint have been reviewed in preparation for this examination.    Past Medical History  He has a past medical history of Acute myocardial infarction (Multi) (09/13/2023), CAD (coronary artery disease) (11/24/2022), Chest pain (02/07/2024), Diabetes mellitus type 1 (Multi), MI (myocardial infarction) (Multi), Mixed hyperlipidemia (09/13/2023), Osteoarthritis (10/2023), Pericardial effusion ([facility]) (02/07/2024), Primary hypertension (09/13/2023), and Varicose veins of lower extremity with inflammation (09/13/2023).    Surgical History  He has a past surgical history that includes Cardiac catheterization (11/2014); Arterial bypass surgry (12/2014); Coronary stent placement (08/31/2020); IR ablation vein (11/2022); and Spider vein injection (Right, 11/2022).     Social History  He reports that he has never smoked. He has never been exposed to tobacco smoke. He has never used smokeless tobacco. He  reports that he does not drink alcohol and does not use drugs.    Family History  Family History   Problem Relation Name Age of Onset    No Known Problems Mother      No Known Problems Father      No Known Problems Sister      No Known Problems Son      Cancer Maternal Grandmother      No Known Problems Maternal Grandfather      Heart attack Paternal Grandmother      Heart disease Paternal Grandfather          Allergies  Cat dander and House dust    Historical Clinical Intake  July 7, 2023 Verbal consent of the patient and/or verbal parental consent for patients under the age of 18 have been obtained to conduct a physical examination at this office visit. Ned is a 42 year old male who was graciously referred into the office by Dr. López who present for an initial evaluation of his BILATERAL knee pain R>L. He reports a history of RIGHT knee pain with an injury occurring at 18 years old while playing racket ball. He describes his RIGHT knee pain as a throbbing ache proximal to the posterolateral aspect of his patella that will radiate into his right Achilles tendon. He states his LEFT knee pain has gotten significantly worse since his compensating for his right knee occurs along the medial aspect which he also describes as a throbbing ache. Ned has positive crepitus with reported popping with ambulation. He denies any numbness, tingling or instability. He rates his pain at 6/10 today. He takes OTC Aleve alternating with OTC Tylenol and elevates his BLE for pain management with minimal to no relief. His PCP, Dr. López, ordered an US of his RLE which was negative for DVT prior to this examination. I stressed to him the importance based off his medical history of having open heart surgery already that he should not be taking any non steroid anti-inflammatory drugs whatsoever he should only be taking Tylenol. We talked in detail about extra supplements that he could take. We also talked in detail about a significant  amount of weight gain that he needs to start being very attentive to is very active and we talked about modifications and exercises that he can do regularly.  ------------------------  September 20, 2023 Verbal consent of the patient and/or verbal parental consent for patients under the age of 18 have been obtained to conduct a physical examination at this office visit. Ned is a newly established patient who is present here today for a reevaluation of his BILATERAL knees. Ned states that he has not noticed any significant change in symptoms; and he continues to c/o bony pain along the posterior medial aspect of both knees He is taking Curcumin/Tumeric and OTC Move Free as directed previously and is also taking Tylenol Arthritis with no change in symptoms. He is unable to take NSAIDS due to history of heart surgery. He has completed 6-8 weeks of physical therapy with no significant improvement in knee pain and is continuing therapy and performing his home exercise program. He was referred back to our office for reevaluation as he was not improving. He states that his RIGHT knee hurts more than his LEFT. Pain is currently 4/10 in right posterior lateral knee and 1/10 on the LEFT knee. Pain is worse with climbing stairs, squatting, and prolonged walking. He was referred back to office by PT because not getting any better and actuaaly getting worse.  ------------------------------------------------------  10/30/23Ned STEF Ernandez is a 43 y.o. male who presents to review his BILATERAL knee MRI which was performed at [facility]. Pt continues to have bilateral knee pain R >L. Pt reports intermittent pain on lateral and posterior knees. Worse with standing for long periods of time and with exertion. Currently treating pain with Tylenol Arthritis, Move Free, and turmeric-curcumin supplements and ice. Pt has been wearing good supportive shoes and inserts and wears compression stockings. Pt completed physical therapy  and is continuing with HEP. Pt feels pain has lessened in frequency since starting supplements and doing PT. Walking up and down stairs causes pain as he has to go one foot at a time. He is an  and sits typically all day.  He would like to start injections as soon as possible.  He says that since he is plateaued so much during physical therapy and it has not helped much she is hoping that the injections will help.  I told them are going to have to get approval for Zilretta injections since he is diabetic for his right knee for his distal IT band syndrome friction syndrome.  Also had to get approval for viscosupplementation injections as well.  We also discussed he has to talk to his cardiologist who prescribed his blood thinner to see what they want us to do for stopping before injections and keeping him off of it after injections.   -------------------------------  11/28/23 Ned Ernandez is a 43 y.o. male who presents for a corticosteroid injection into the patients BILATERAL knees. Patient continues to experience pain in both knees around all sides but the lateral and posterior side are worse. He states he constantly feels the bones of both knees popping. Pain is worse when moving from standing to sitting position and ascending stairs. Pain is also worse without shoes. He treats the pain with Tylenol when needed. He continues to wear good supportive footwear with inserts with his 4mm heel lift in LEFT shoe. He completed physical therapy but continues to participate in HEP. He also continues to take Move Free an turmeric supplements. Patient discussed stopping his Xarelto and aspirin with cardiologist, Dr. Alexis during the course of the injections.   I told him we try to get approval for the Zilretta injections however his insurance did not allow it so he had a corticosteroid injection and they wanted to see how that affected his blood sugars.  I told him to monitor his blood sugars regularly and he may  have to use some insulin.    --------------------------------------------------  12/11/23 Ned Ernandez is a 43 y.o. male who presents for Euflexxa Injection 1/3 injection into the patients BILATERAL knees. Patient reports resolution of BILATERAL knee pain until yesterday when he reports placing a stress on the outside of his RIGHT knee stepping out of a car. Patient notes 1/10 pain in the lateral aspect of his RIGHT knee that was not significant enough to take NSAIDs to resolve, but is still present this morning. Patient received corticosteroid injection to bilateral knees two weeks ago. He was denied Zilretta injections by his insurance despite being diabetic. He reports that his pain corticosteroid injection improved significantly after the injections; however, he battled high blood glucose levels. He reports that he needed to take 3-4 times the amount of normal insulin to keep his sugars between 350-400. He states that this lasted 4 days before his sugar levels returned to normal just above 100.    -------------------------------------------------------  12/27/23 Ned Ernandez is a 43 y.o. male who presents for Euflexxa Injection 2/3 injection into his BILATERAL knees. Pt states that he has had noticeable improvement since beginning injections. There is intermittent sharp mid right patellar pain and posterior pain which occurs less frequently along his mid patella. Upon walking up stairs pain is exacerbated. He is not currently using any pain management interventions at this time.   Overall he is very happy with the results of the injections that he feels are helping his knees tremendously   -------------------------------------------------------  1/3/24  Ned Ernandez is a 43 y.o. male who presents for Euflexxa Injection 3/3 injection into the patients BILATERAL Knees. He states that overall he is doing significantly better since receiving Euflexxa injections into his bilateral knees. He denies any pain,  instability, locking, catching, or swelling in either knee. He continues to take Tylenol and Ibuprofen for pain management as needed and is also continuing to perform his home exercise program as previously directed.  He rates his bilateral knee pain at 0/10 today.   he says he is very happy with the results of the injections and how well his knees are feeling.  He is hoping honestly he will be able to continue on getting these injections in the future and to avoid surgical intervention.  -------------------------------------------------------  04/03/24 Ned Ernandez is a 43 y.o. male who presents for follow up of BILATERAL knees. He reports significant improvement to his knee pain from before doing the injections. On rare occasion he will feel some minimal pain to the lateral aspect of his RIGHT knee. Additionally he will feel pain when ascending stairs in the LEFT knee. Overall his pain is very infrequent and minimal and so he has not needed to treat his pain with any pain management interventions. He denies any popping, catching, locking or instability of either knee. He continues to do his home exercise program that he previously learned in Physical Therapy every day. Ned also wears good supportive shoes with full foot insoles and a heel lift in his LEFT shoe as well as his compression stockings to promote good blood flow and proper body mechanics. He has also been taking Move Free and turmeric supplementation. He is extremely happy with his improvement with the injections and would like to pursue another round in the future to avoid surgical intervention so he can enjoy riding his bike in the summer.   -------------------------------------------------------  06/10/24 Ned Ernandez is a 43 y.o. male who presents for Euflexxa #1 (NS) injection into his BILATERAL knees. He continues to perform his home strengthening and stretching exercises previously learned in Physical Therapy. He reports approximately x1-2  weeks following his last evaluation onset of sharp pains along the inferolateral aspect of his LEFT > Right patella, most notable when ascending stairs. He cites living in a home where he is unable to avoid use of stairs on a daily basis. He denies any numbness, tingling or radiculopathy at this time. He denies pain at rest, but says when present pain can reach 5/10. He is not utilizing any pain management interventions at this time. He wears bilateral compression stockings, full foot insoles, appropriate heel lift in his LEFT shoe and supportive footwear as previously recommended.  he still is happy with the injections and will like to try to avoid surgical intervention long-term wise.      Review of Systems:  CONSTITUTIONAL:   Negative for weight change, loss of appetite, fatigue, weakness, fever, chills, night sweats, headaches .           HEENT:   Negative for cold, cough, sore throat, sinus pain, swollen lymph nodes.           OPHTHALMOLOGY:   Negative for diminished vision, blurred vision, loss of vision, double vision.           ALLERGY:   Negative for runny nose, scratchy throat, sinus congestion, rash, facial pressure, nasal congestion, post-nasal drip.           CARDIOLOGY:   Negative for chest pain, palpitations, murmurs, irregular heart beat, shortness of breath, leg edema, dyspnea on exertion, fatigue, dizziness.           RESPIRATORY:   Negative for chest pain, shortness of breath, swelling of the legs, asthma/copd, chest congestion, pain with breathing .           GASTROENTEROLOGY:   Negative for nausea, vomitting, heartburn, constipation, diarrhea, blood in stool, change in bowel habits, black stool.           HEMATOLOGY/LYMPH:   Negative for fatigue, loss of appetitie, easy bruising, easy bleeding, anemia, abnormal bleeding, slow healing.           ENDOCRINOLOGY:   Negative for polyuria, polydipsia, polyphagia, fatigue, weight loss, weight gain, cold intolerance, heat intolerance, diabetes.            MUSCULOSKELETAL:   Positive  for Bilateral Knee pain         DERMATOLOGY:   Negative for rash, bruising.           NEUROLOGY:   Negative for tingling, numbness, gait abnormality, paresthesias, weakness, sciatica.          General Examination:  GENERAL APPEARANCE: appears well, pleasant, and cooperative, NAD.   HEENT: normal, unremarkable.   NECK: supple.   HEART: RRR, normal S1S2.   LUNGS: clear to auscultation bilaterally.   ABDOMEN: soft, NT/ND, BS present.   EXTREMITIES: no cyanosis, clubbing.   SKIN: no rash or cellulitis.   NEUROLOGIC EXAM: awake, A&O x 3, CN's II-XII grossly intact.   PSYCH: good eye contact, appropriate mood and affect .     ALL PREVIOUS FINDINGS IMPROVED SINCE STARTING VISCOSUPPLEMENTATION INJECTIONS           General (KNEE):   Location:  BILATERAL  LEFT > RIGHT  Erythema: Negative.   Edema:   Negative   Effusion: Negative.   Warmth: Negative.   Percussion Test:  Negative:  Tuning Fork Test:  Negative:   Ecchymosis/Bruising: Negative.   Abrasions: Negative.   Palpation: Negative.  Baker's Cyst: Negative.   Orientation: Symmetrical.   Range of Motion: FROM, Not Painful  Knee Flexion ( 0-135 degrees)  Knee Extension ( 0-15 degrees)  Hip Flexion (120-130 degrees)  Hip Extension (10-20 degrees)  Hip ABduction towards body (45 degrees)  Hip ADduction away from body (30 degrees)  Internal Rotation (45 degrees)          External Rotation (50 degrees).         Muscle Strength:   +5/+5 Quadricep Extension  +5/+5 Hamstring Flexion  +5+5 Hip Flexion  +5+5 Hip Extension  +5/+5 Hip ABduction away from body  +5/+5 Hip ADduction towards body  +5/+5 Hip Internal Rotation  +5/+5 Hip External Rotation  +5+5 IT-Band  +5+5 Gastrocnemius  +5+5 Soleus.      DTR/Neurological:   +2/+4 Patellar (L4)  +2/+4 Posterior Tibialis Reflex and Medial Hamstrings (L5)  +2/+4 Achilles (S1).            Sensation/Neurological Lumbar:    Negative, Sensation intact:  L1: Low back, hips, and groin  L2: Low back and front of  inside of upper leg/thigh  L3: Low back and front of upper leg/thigh  L4: Low back, front of upper leg/thigh, front of lower leg/calf, front of medial area of knee, and inside of ankle  L5: Low back, front and lateral knee, front and outside of lower leg/calf, top and bottom of foot and first four toes especially big toe.      Sensation/Neurological Sacrum:   Negative, Sensation Intact, 2 -Point Discrimination Test: Negative  S1: low back, back of upper leg/thigh, back and inside of lower leg, calf and little toe  S2: Buttocks, genitals, back of upper leg/thigh and calves  S3: Buttocks and genitals  S4: Buttocks  S5: Buttocks.       Pain with Squat:  Positive   Pain with Sumo Squat:  Positive   Vascular: +2/+4 Femoral, +2/+4 Dorsalis Pedis, +2/+4 Posterior Tibial  Capillary Refill less than 2 seconds.     Leg length discrepancy:  Positive: LEFT leg shorter than the Right by approximately 4 mm verified with standing erect pelvis x-ray   Feet/Foot: Positive  Valgus Foot Deformity (Pes Planus) BILATERAL.      Knee - ACL:  Anterior Drawer Test: Negative.   Lachman Test: Negative.   Prone Lachman Test: Negative.   Lelli Test: Negative.   Pivot Shift Test: Negative.   Crossover Test: Negative.      KNEE - FAT PAD:  Squeeze Test Knee Bent going into extension: Negative.      KNEE - IT BAND:   Belkys Test:  Negative:   Noble Test: Negative:         KNEE - MCL / LCL:   Valgus Stress Test:  90 degrees: Negative, 20-30 degrees: Negative.   Varus Stress Test:  90 degrees: Negative, 20-30 degrees: Negative.   Apley Distraction Test:  Positive:, Medial:, MCL:.   Thessaly Test:  Negative        KNEE - MENISCUS:    Apley Compression Test:  Negative  Duck Walk Test:  Negative.   Stienmann 1 Test:  Negative.   Stienmann 2 Test:  Negative.   Porfirio Test:  Negative  Bragard's Test:  Negative.   Bounce Home Test:  Negative.   Payr Test:  Negative.   Thessaly Test:  Negative:  Yordan's Test:  Negative.         KNEE - PATELLA:     Apprehension Test:   Positive.  Glide Test:  Negative.   Facet Tenderness Test:   Positive..   Medial Patellar Plica Test:  Negative.   Grind Test:   Positive.  Patella Tracking Test Positive.  Medial/Lateral Subluxation Test:  Negative.   Suppression Test:  Negative.         Knee - PCL/POSTERIOR LATERAL CORNER:  Posterior Drawer Test: Negative.   Jimenes 90/90 (Sag Sign) Test: Negative.   Dial Test: Negative.   Reverse Lachman Test: Negative.   Quad Activation Test: Negative.   ER / Recurvatum Test: Negative.   Reverse Pivot Shift Test: Negative.      KNEE - POPLITEUS:  Jean Pierre's Test: Negative.      KNEE - QUADRICEPS:  Dreyer Test: Negative.   VMO Test: Negative.   VLO Test: Negative.      Hip/Pelvis - Sacrum:  Leg Length Supine:  Negative  Leg Length Supine to Seated (Derbolowsky Sign):  Negative  Standing Flexion Test:  Negative.   Seated Flexion Test:  Negative   Spring Test:  Negative   Sacral Somatic Dysfunction:  Negative   Sacroiliac (SI) Joint Fixation Test:  Negative   Hip Flexor Tightness:  Negative   Hamstring Tightness:  Positive.      Diagnostic studies:  MRI KNEE LEFT performed at [facility] on 10/13/23    IMPRESSION MRI KNEE LEFT:  Intact menisci, ligaments and tendons  Underlying degenerative changes as follows: Mild chondral thinning of the weightbearing aspect of the lateral tibial plateau, mild thinning of the articular cartilage of the weightbearing aspects of the medial femoral condyle and tibial plateau, full thickness cartilage loss of the medial patellar facet and high-grade chondral loss of the cartilage of the lateral facet with subchondral signal change noted laterally.  Joint effusion - small suprapatellar  Diffuse subcutaneous soft tissue edema  --------------------------  MRI KNEE RIGHT performed at [facility] on 10/13/23    IMPRESSION:MRI KNEE RIGHT:    IT band syndrome as follows: Edema like signal in between the IT band and lateral femoral condyle compatible with IT  band friction syndrome.  Complex tear of the posterior horn and body of the medial meniscus   Osteoarthritis of the medial patellofemoral compartments  Joint effusion - moderate sized suprapatellar  Subcutaneous soft tissue edema    ----------------------  Standing erect pelvis x-ray shows the following:  Left leg shorter than right leg at the iliac crest by 6 millimeters mm  Right leg shorter than left leg at Sacral Base by 6 millimeters mm  Left leg shorter than right leg at midline Femoral Heads by 4 mm  Overall difference left leg shorter than right leg by approximately 4 millimeters                    PROCEDURE: PELVIS 1 OR 2 VIEW - TXR 0039  STUDY: PELVIS 1 OR 2 VIEW 7/7/2023 8:15 am     IMPRESSION PELVIS XR:  Normal erect AP view of the pelvis     Original Interpreting Physician: ISRRAEL STOVER M.D.  Original Transcribed by/Date: [facility] Jul 7 2023 7:42A  ----------------------------------------------------  PROCEDURE: KNEE BI MIN 4 VIEW - TXR 0220  STUDY: KNEE BI MIN 4 VIEW 7/7/2023 8:15 am     IMPRESSION BILATERAL KNEE XR:  There is narrowing of the patellofemoral and medial compartments of each knee with the medial compartmental narrowing more pronounced on the right than on the left. There is mild osteophytosis of each knee observed as well.     Original Interpreting Physician: ISRRAEL STOVER M.D.  Original Transcribed by/Date: [facility] Jul 7 2023 7:42A  ----------------------------------------------------  PROCEDURE: LOWER LEG RT NON VASC LIMITED - [facility] [id number]  STUDY:LOWER LEG RT NON VASC LIMITED; 6/16/2023 3:49 pm     IMPRESSION RLE US:  Normal ultrasound of the right popliteal fossa.     Original Interpreting Physician: ABBY PALMA M.D.  Original Transcribed by/Date: [facility] Jun 16 2023 3:04P        Assessment   1. Acute pain of left knee        2. Primary osteoarthritis of left knee        3. Effusion of left knee        4. Patellofemoral disorders, left knee        5. Acute medial meniscal injury of left knee,  subsequent encounter        6. Patellar tendinitis, left knee        7. Iliotibial band syndrome of left side        8. Acute pain of right knee        9. Effusion of right knee        10. Primary osteoarthritis of right knee        11. Acute medial meniscal injury of right knee, subsequent encounter        12. Patellofemoral disorders, right knee        13. Patellar tendinitis, right knee        14. Iliotibial band syndrome of right side        15. Hamstring tightness        16. Leg length discrepancy        17. Valgus deformity of both feet            L Inj/Asp: bilateral knee on 6/21/2024 8:05 AM  Indications: pain and diagnostic evaluation  Details: 22 G needle, ultrasound-guided  Medications (Right): 2 mL sodium hyaluronate 10 mg/mL(mw 2.4 -3.6 million)  Medications (Left): 2 mL sodium hyaluronate 10 mg/mL(mw 2.4 -3.6 million)  Outcome: tolerated well, no immediate complications  Procedure, treatment alternatives, risks and benefits explained, specific risks discussed. Consent was given by the patient. Immediately prior to procedure a time out was called to verify the correct patient, procedure, equipment, support staff and site/side marked as required.          Procedure   Time Out (5 Minutes): Yes  Patient Name: Ned Ernandez  YOB: 1980  Procedure: Euflexxa Injection 2/3  Needed Supplies Available: Correct Supplies  Laterality: BILATERAL knee  Site Verified and Marked: Correct Site(s) Marked  Timeout Performed by Provider: Dr. Jair Woodruff D.O.  Staff Initials: ALZ    Patient is informed and consent has been signed by the patient if over 18 years old., Patient parent and/or legal guardian is informed and consent has been signed., Patient and/or parent and/or legal guardian accept all risks, benefits, and possible complications associated with procedure(s) and/or manipulation(s) and/or injection(s)., Patient and/or parent and/or legal guardian deny patient allergy or known complications  with any of the medications, instruments, products, and/or techniques used., All questions and concerns were answered and/or addressed with the patient and/or parent and/or legal guardian., The patient and/or parent and/or legal guardian agree to proceed with the procedure(s) and/or manipulation(s) and/or injection(s)., Prep: The area was cleansed with a mixture of equal parts rubbing alcohol 70% and Hibclens., Utilizing clean technique, the injection site(s) were marked with a skin marker., and Injection site(s) were anesthestized with topical analgesic spray prior to injection.    Performed regenerative Euflexxa Injection 2/3 into the patients BILATERAL knee, under ultrasound.  Euflexxa (1%)    Band-aid and/or compressive bandage was placed over the injection site(s). After the injection(s) performed osteopathic manipulation therapy to the affected area(s) to help increase blood flow to aid with the healing process as well as circulation of the medication. The patient tolerated the procedure well without any complications. The patient was instructed to gently massage the treatment site(s) as well as to apply moist heat to the affected area(s). Additionally, the patient was instructed to contact the office immediately with any complications or concerns.    The Nurse, Silvana, was present in the room during the entire procedure.    The following discharge instructions were reviewed in detail with the patient to the level of their understanding:    PAIN:  A mild to moderate amount of discomfort, tenderness, stiffness, and achiness-caused by the inflammation of the area can be expected for a few days after the injection. This is normal and good as the inflammation is an important part of the healing process.    SKIN: Due to the numbing spray used, you may develop a red, itchy, scaly rash in the area that was sprayed. Should your skin become itchy, wash the area with mild soap and warm water. If needed, you may apply  topical over-the-counter Hydrocortisone cream to alleviate any itchiness. AVOID scratching due to risk of infection.,     BATHING/SWIMMING: You may shower after your procedure with regular soap and water, but no baths, no swimming, and no hot tubs for 3-4 days after the procedure.,     ACTIVITIES:  Immediately following the injection, you may resume daily activities (such as work) and light exercise. We suggest that you refrain from strenuous activity and heavy lifting, particularly the injured body part, for a week post-procedure, but sometimes this can change as well.    MOIST HEAT/ICE:  Moist heat may be used on the injection area. NO ICE.  MEDICATION: You may continue your prescribed medications and any over-the-counter supplements; however, it is not recommended to use aspirin or anti-inflammatories (Motrin, Advil, Aleve, Ibuprofen, Naproxen etc.) for up to 2 weeks post procedure. Tylenol Extra Strength, Tylenol Arthritis and/or any prescribed narcotics or muscle relaxants are OK to take.    APPOINTMENTS: You should have a follow-up appointment with Dr. Woodruff scheduled 1-3 weeks as recommended after your procedure for your next round of injections or to follow-up after you have completed your injection series. Please schedule your follow-up appointment on your way out after your procedure, or call the office to schedule these appointments as soon as possible.    PRECAUTIONS: Smoking, caffeine, alcohol, sex and anti-inflammatories post-procedure may reduce the effectiveness of Prolotherapy/Neural Prolotherapy/Prolozone injections. Early, rare post procedure problems that can be concerning are as follows: an increase in pain not relieved by medication (over the counter or prescription), a temperature above 101 degrees, bleeding or progressive, extreme swelling, or numbness.     CALL THE OFFICE OR GO TO THE EMERGENCY ROOM IF ANY OF THESE SYMPTOMS OCCUR.   If you have any questions or problems, please call our  office at [phone]       Treatment or Intervention:  May continue to alternate ice and moist heat as needed  Once again, reviewed degenerative joint disease of the knees, patellofemoral tracking syndrome, and/or patellar tendinitis as well as meniscus injury in detail with the patient to the level of their understanding  Stressed the importance of continuing to perform home exercises previously learned in Physical Therapy daily   Once again, reviewed home exercises to be performed by the patient routinely  Once again stressed the importance of  continuing to wear shoes with good stability control to help with the biomechanics affecting the knees and lower extremities  Once again, stressed the importance of  continuing to wear full foot insoles to help with the biomechanics affecting the knees and lower extremities  Once again, recommendation to continue over-the-counter vitamin-D 2 -3000+ milligrams a day, as well as a daily multivitamin.  May continue to take over-the-counter curcumin, turmeric, boswellia, as well as egg shell membrane as directed to aid with joint inflammation.  May continue to take over-the-counter Move Free for joint health.  Patient advised regarding the risks and/or potential adverse reactions and/or side effects of any prescribed medications along with any over-the-counter medications or any supplements used. Patient advised to seek immediate medical care if any adverse reactions occur. The patient and/or patient(s) parent(s) verbalized their understanding  Once again, discussed in detail with the patient to the level of their understanding the possibility in the future of regenerative injections   Once again, reviewed in detail about exercise modifications and how to avoid repetitive overuse his knees.  Once again, reviewed different dietary modifications, including intermittent fasting to aid with weight management  Stressed the importance of continuing to wear 4 millimeter heel lift  placed into his left shoe to accommodate leg-length discrepancy found on standing erect pelvis x-ray  Performed Euflexxa injection #2 (NS) into the patients BILATERAL knees under ultrasound. The patient tolerated the procedure well and without any adverse effects. Discharge instructions for VISCOSUPPLEMENTATION injections were reviewed in detail with the patient to the level of their understanding; a copy of these instructions were provided to the patient at the time of this office visit.  Follow-up next week for Euflexxa #3 injection into the patients BILATERAL knees or sooner as needed    Please note that this report has been produced using speech recognition software.  It may contain errors related to grammar, punctuation or spelling.  Electronically signed, but not reviewed.  KATE Lamb, Director of Sports Medicine       KOKO HORTON on 6/21/24 at 8:06 AM.     RIO Lamb DO     injected bilateral knees with viscosupplementation injections of Euflexxa

## 2024-06-25 NOTE — PROGRESS NOTES
Verbal consent of the patient and/or verbal parental consent for patients under the age of 18 have been obtained to conduct a physical examination at this office visit.    Established patient  History Of Present Illness  06/27/24 Ned Ernandez is a 43 y.o. male who presents for Euflexxa #3 injection into his BILATERAL knees.    All previous Progress Notes and imaging results related to this patients chief complaint have been reviewed in preparation for this examination.    Past Medical History  He has a past medical history of Acute myocardial infarction (Multi) (09/13/2023), CAD (coronary artery disease) (11/24/2022), Chest pain (02/07/2024), Diabetes mellitus type 1 (Multi), MI (myocardial infarction) (Multi), Mixed hyperlipidemia (09/13/2023), Osteoarthritis (10/2023), Pericardial effusion ([facility]) (02/07/2024), Primary hypertension (09/13/2023), and Varicose veins of lower extremity with inflammation (09/13/2023).    Surgical History  He has a past surgical history that includes Cardiac catheterization (11/2014); Arterial bypass surgry (12/2014); Coronary stent placement (08/31/2020); IR ablation vein (11/2022); and Spider vein injection (Right, 11/2022).     Social History  He reports that he has never smoked. He has never been exposed to tobacco smoke. He has never used smokeless tobacco. He reports that he does not drink alcohol and does not use drugs.    Family History  Family History   Problem Relation Name Age of Onset    No Known Problems Mother      No Known Problems Father      No Known Problems Sister      No Known Problems Son      Cancer Maternal Grandmother      No Known Problems Maternal Grandfather      Heart attack Paternal Grandmother      Heart disease Paternal Grandfather       Allergies  Cat dander and House dust    Historical Clinical Intake  Historical Clinical Intake  July 7, 2023 Verbal consent of the patient and/or verbal parental consent for patients under the age of 18 have been  obtained to conduct a physical examination at this office visit. Ned is a 42 year old male who was graciously referred into the office by Dr. López who present for an initial evaluation of his BILATERAL knee pain R>L. He reports a history of RIGHT knee pain with an injury occurring at 18 years old while playing racket ball. He describes his RIGHT knee pain as a throbbing ache proximal to the posterolateral aspect of his patella that will radiate into his right Achilles tendon. He states his LEFT knee pain has gotten significantly worse since his compensating for his right knee occurs along the medial aspect which he also describes as a throbbing ache. Ned has positive crepitus with reported popping with ambulation. He denies any numbness, tingling or instability. He rates his pain at 6/10 today. He takes OTC Aleve alternating with OTC Tylenol and elevates his BLE for pain management with minimal to no relief. His PCP, Dr. López, ordered an US of his RLE which was negative for DVT prior to this examination. I stressed to him the importance based off his medical history of having open heart surgery already that he should not be taking any non steroid anti-inflammatory drugs whatsoever he should only be taking Tylenol. We talked in detail about extra supplements that he could take. We also talked in detail about a significant amount of weight gain that he needs to start being very attentive to is very active and we talked about modifications and exercises that he can do regularly.  ------------------------  September 20, 2023 Verbal consent of the patient and/or verbal parental consent for patients under the age of 18 have been obtained to conduct a physical examination at this office visit. Ned is a newly established patient who is present here today for a reevaluation of his BILATERAL knees. Ned states that he has not noticed any significant change in symptoms; and he continues to c/o bony pain along the posterior  medial aspect of both knees He is taking Curcumin/Tumeric and OTC Move Free as directed previously and is also taking Tylenol Arthritis with no change in symptoms. He is unable to take NSAIDS due to history of heart surgery. He has completed 6-8 weeks of physical therapy with no significant improvement in knee pain and is continuing therapy and performing his home exercise program. He was referred back to our office for reevaluation as he was not improving. He states that his RIGHT knee hurts more than his LEFT. Pain is currently 4/10 in right posterior lateral knee and 1/10 on the LEFT knee. Pain is worse with climbing stairs, squatting, and prolonged walking. He was referred back to office by PT because not getting any better and actuaaly getting worse.  ------------------------------------------------------  10/30/23Ned STEF Ernandez is a 43 y.o. male who presents to review his BILATERAL knee MRI which was performed at [facility]. Pt continues to have bilateral knee pain R >L. Pt reports intermittent pain on lateral and posterior knees. Worse with standing for long periods of time and with exertion. Currently treating pain with Tylenol Arthritis, Move Free, and turmeric-curcumin supplements and ice. Pt has been wearing good supportive shoes and inserts and wears compression stockings. Pt completed physical therapy and is continuing with HEP. Pt feels pain has lessened in frequency since starting supplements and doing PT. Walking up and down stairs causes pain as he has to go one foot at a time. He is an  and sits typically all day.  He would like to start injections as soon as possible.  He says that since he is plateaued so much during physical therapy and it has not helped much she is hoping that the injections will help.  I told them are going to have to get approval for Zilretta injections since he is diabetic for his right knee for his distal IT band syndrome friction syndrome.  Also had to get  approval for viscosupplementation injections as well.  We also discussed he has to talk to his cardiologist who prescribed his blood thinner to see what they want us to do for stopping before injections and keeping him off of it after injections.   -------------------------------  11/28/23 Ned Ernandez is a 43 y.o. male who presents for a corticosteroid injection into the patients BILATERAL knees. Patient continues to experience pain in both knees around all sides but the lateral and posterior side are worse. He states he constantly feels the bones of both knees popping. Pain is worse when moving from standing to sitting position and ascending stairs. Pain is also worse without shoes. He treats the pain with Tylenol when needed. He continues to wear good supportive footwear with inserts with his 4mm heel lift in LEFT shoe. He completed physical therapy but continues to participate in HEP. He also continues to take Move Free an turmeric supplements. Patient discussed stopping his Xarelto and aspirin with cardiologist, Dr. Alexis during the course of the injections.   I told him we try to get approval for the Zilretta injections however his insurance did not allow it so he had a corticosteroid injection and they wanted to see how that affected his blood sugars.  I told him to monitor his blood sugars regularly and he may have to use some insulin.    --------------------------------------------------  12/11/23 Ned Ernandez is a 43 y.o. male who presents for Euflexxa Injection 1/3 injection into the patients BILATERAL knees. Patient reports resolution of BILATERAL knee pain until yesterday when he reports placing a stress on the outside of his RIGHT knee stepping out of a car. Patient notes 1/10 pain in the lateral aspect of his RIGHT knee that was not significant enough to take NSAIDs to resolve, but is still present this morning. Patient received corticosteroid injection to bilateral knees two weeks ago. He was  denied Zilretta injections by his insurance despite being diabetic. He reports that his pain corticosteroid injection improved significantly after the injections; however, he battled high blood glucose levels. He reports that he needed to take 3-4 times the amount of normal insulin to keep his sugars between 350-400. He states that this lasted 4 days before his sugar levels returned to normal just above 100.    -------------------------------------------------------  12/27/23 Ned Ernandez is a 43 y.o. male who presents for Euflexxa Injection 2/3 injection into his BILATERAL knees. Pt states that he has had noticeable improvement since beginning injections. There is intermittent sharp mid right patellar pain and posterior pain which occurs less frequently along his mid patella. Upon walking up stairs pain is exacerbated. He is not currently using any pain management interventions at this time.   Overall he is very happy with the results of the injections that he feels are helping his knees tremendously   -------------------------------------------------------  1/3/24  Ned Ernandez is a 43 y.o. male who presents for Euflexxa Injection 3/3 injection into the patients BILATERAL Knees. He states that overall he is doing significantly better since receiving Euflexxa injections into his bilateral knees. He denies any pain, instability, locking, catching, or swelling in either knee. He continues to take Tylenol and Ibuprofen for pain management as needed and is also continuing to perform his home exercise program as previously directed.  He rates his bilateral knee pain at 0/10 today.   he says he is very happy with the results of the injections and how well his knees are feeling.  He is hoping honestly he will be able to continue on getting these injections in the future and to avoid surgical intervention.  -------------------------------------------------------  04/03/24 ParvezSTEF Ernandez is a 43 y.o. male who presents  for follow up of BILATERAL knees. He reports significant improvement to his knee pain from before doing the injections. On rare occasion he will feel some minimal pain to the lateral aspect of his RIGHT knee. Additionally he will feel pain when ascending stairs in the LEFT knee. Overall his pain is very infrequent and minimal and so he has not needed to treat his pain with any pain management interventions. He denies any popping, catching, locking or instability of either knee. He continues to do his home exercise program that he previously learned in Physical Therapy every day. Ned also wears good supportive shoes with full foot insoles and a heel lift in his LEFT shoe as well as his compression stockings to promote good blood flow and proper body mechanics. He has also been taking Move Free and turmeric supplementation. He is extremely happy with his improvement with the injections and would like to pursue another round in the future to avoid surgical intervention so he can enjoy riding his bike in the summer.   -------------------------------------------------------  06/10/24 Ned Ernandez is a 43 y.o. male who presents for Euflexxa #1 (NS) injection into his BILATERAL knees. He continues to perform his home strengthening and stretching exercises previously learned in Physical Therapy. He reports approximately x1-2 weeks following his last evaluation onset of sharp pains along the inferolateral aspect of his LEFT > Right patella, most notable when ascending stairs. He cites living in a home where he is unable to avoid use of stairs on a daily basis. He denies any numbness, tingling or radiculopathy at this time. He denies pain at rest, but says when present pain can reach 5/10. He is not utilizing any pain management interventions at this time. He wears bilateral compression stockings, full foot insoles, appropriate heel lift in his LEFT shoe and supportive footwear as previously recommended.  he still is happy  with the injections and will like to try to avoid surgical intervention long-term wise.  -----------------------------------------------------   06/21/24 Ned Ernandez is a 43 y.o. male who presents for Euflexxa #2 injection into his BILATERAL knees. He performs his home strengthening and stretching exercises previously learned in Physical Therapy. He describes his pain as an ache along the inferolateral aspect of both patella LEFT > Right. He notes ascending stairs is when pain is at its greatest. He denies any instability, locking, or catching at this time. He rates his pain at 1/10 today. He continues to wear bilateral compression sleeves to both lower extremities, as well as supportive footwear, full foot insoles and heel lift in his left shoe as directed. He continues to note improvement in pain and functionality overall since starting this series of injections.      Review of Systems:  CONSTITUTIONAL:   Negative for weight change, loss of appetite, fatigue, weakness, fever, chills, night sweats, headaches .           HEENT:   Negative for cold, cough, sore throat, sinus pain, swollen lymph nodes.           OPHTHALMOLOGY:   Negative for diminished vision, blurred vision, loss of vision, double vision.           ALLERGY:   Negative for runny nose, scratchy throat, sinus congestion, rash, facial pressure, nasal congestion, post-nasal drip.           CARDIOLOGY:   Negative for chest pain, palpitations, murmurs, irregular heart beat, shortness of breath, leg edema, dyspnea on exertion, fatigue, dizziness.           RESPIRATORY:   Negative for chest pain, shortness of breath, swelling of the legs, asthma/copd, chest congestion, pain with breathing .           GASTROENTEROLOGY:   Negative for nausea, vomitting, heartburn, constipation, diarrhea, blood in stool, change in bowel habits, black stool.           HEMATOLOGY/LYMPH:   Negative for fatigue, loss of appetitie, easy bruising, easy bleeding, anemia, abnormal  bleeding, slow healing.           ENDOCRINOLOGY:   Negative for polyuria, polydipsia, polyphagia, fatigue, weight loss, weight gain, cold intolerance, heat intolerance, diabetes.           MUSCULOSKELETAL:   Positive  for Bilateral Knee pain         DERMATOLOGY:   Negative for rash, bruising.           NEUROLOGY:   Negative for tingling, numbness, gait abnormality, paresthesias, weakness, sciatica.          General Examination:  GENERAL APPEARANCE: appears well, pleasant, and cooperative, NAD.   HEENT: normal, unremarkable.   NECK: supple.   HEART: RRR, normal S1S2.   LUNGS: clear to auscultation bilaterally.   ABDOMEN: soft, NT/ND, BS present.   EXTREMITIES: no cyanosis, clubbing.   SKIN: no rash or cellulitis.   NEUROLOGIC EXAM: awake, A&O x 3, CN's II-XII grossly intact.   PSYCH: good eye contact, appropriate mood and affect .      ALL PREVIOUS FINDINGS IMPROVED SINCE STARTING VISCOSUPPLEMENTATION INJECTIONS           General (KNEE):   Location:  BILATERAL  LEFT > RIGHT  Erythema: Negative.   Edema:   Negative   Effusion: Negative.   Warmth: Negative.   Percussion Test:  Negative:  Tuning Fork Test:  Negative:   Ecchymosis/Bruising: Negative.   Abrasions: Negative.   Palpation: Negative.  Baker's Cyst: Negative.   Orientation: Symmetrical.   Range of Motion: FROM, Not Painful  Knee Flexion ( 0-135 degrees)  Knee Extension ( 0-15 degrees)  Hip Flexion (120-130 degrees)  Hip Extension (10-20 degrees)  Hip ABduction towards body (45 degrees)  Hip ADduction away from body (30 degrees)  Internal Rotation (45 degrees)          External Rotation (50 degrees).         Muscle Strength:   +5/+5 Quadricep Extension  +5/+5 Hamstring Flexion  +5+5 Hip Flexion  +5+5 Hip Extension  +5/+5 Hip ABduction away from body  +5/+5 Hip ADduction towards body  +5/+5 Hip Internal Rotation  +5/+5 Hip External Rotation  +5+5 IT-Band  +5+5 Gastrocnemius  +5+5 Soleus.      DTR/Neurological:   +2/+4 Patellar (L4)  +2/+4 Posterior Tibialis  Reflex and Medial Hamstrings (L5)  +2/+4 Achilles (S1).            Sensation/Neurological Lumbar:    Negative, Sensation intact:  L1: Low back, hips, and groin  L2: Low back and front of inside of upper leg/thigh  L3: Low back and front of upper leg/thigh  L4: Low back, front of upper leg/thigh, front of lower leg/calf, front of medial area of knee, and inside of ankle  L5: Low back, front and lateral knee, front and outside of lower leg/calf, top and bottom of foot and first four toes especially big toe.      Sensation/Neurological Sacrum:   Negative, Sensation Intact, 2 -Point Discrimination Test: Negative  S1: low back, back of upper leg/thigh, back and inside of lower leg, calf and little toe  S2: Buttocks, genitals, back of upper leg/thigh and calves  S3: Buttocks and genitals  S4: Buttocks  S5: Buttocks.         Pain with Squat:  Positive   Pain with Sumo Squat:  Positive   Vascular: +2/+4 Femoral, +2/+4 Dorsalis Pedis, +2/+4 Posterior Tibial  Capillary Refill less than 2 seconds.      Leg length discrepancy:  Positive: LEFT leg shorter than the Right by approximately 4 mm verified with standing erect pelvis x-ray   Feet/Foot: Positive  Valgus Foot Deformity (Pes Planus) BILATERAL.      Knee - ACL:  Anterior Drawer Test: Negative.   Lachman Test: Negative.   Prone Lachman Test: Negative.   Lelli Test: Negative.   Pivot Shift Test: Negative.   Crossover Test: Negative.      KNEE - FAT PAD:  Squeeze Test Knee Bent going into extension: Negative.      KNEE - IT BAND:   Belkys Test:  Negative:   Noble Test: Negative:         KNEE - MCL / LCL:   Valgus Stress Test:  90 degrees: Negative, 20-30 degrees: Negative.   Varus Stress Test:  90 degrees: Negative, 20-30 degrees: Negative.   Apley Distraction Test:  Positive:, Medial:, MCL:.   Thessaly Test:  Negative        KNEE - MENISCUS:    Apley Compression Test:  Negative  Duck Walk Test:  Negative.   Stienmann 1 Test:  Negative.   Stienmann 2 Test:  Negative.   Porfirio  Test:  Negative  Bragard's Test:  Negative.   Bounce Home Test:  Negative.   Payr Test:  Negative.   Thessaly Test:  Negative:  Yordan's Test:  Negative.         KNEE - PATELLA:    Apprehension Test:   Positive.  Glide Test:  Negative.   Facet Tenderness Test:   Positive..   Medial Patellar Plica Test:  Negative.   Grind Test:   Positive.  Patella Tracking Test Positive.  Medial/Lateral Subluxation Test:  Negative.   Suppression Test:  Negative.         Knee - PCL/POSTERIOR LATERAL CORNER:  Posterior Drawer Test: Negative.   Jimenes 90/90 (Sag Sign) Test: Negative.   Dial Test: Negative.   Reverse Lachman Test: Negative.   Quad Activation Test: Negative.   ER / Recurvatum Test: Negative.   Reverse Pivot Shift Test: Negative.      KNEE - POPLITEUS:  Jean Pierre's Test: Negative.      KNEE - QUADRICEPS:  Dreyer Test: Negative.   VMO Test: Negative.   VLO Test: Negative.      Hip/Pelvis - Sacrum:  Leg Length Supine:  Negative  Leg Length Supine to Seated (Derbolowsky Sign):  Negative  Standing Flexion Test:  Negative.   Seated Flexion Test:  Negative   Spring Test:  Negative   Sacral Somatic Dysfunction:  Negative   Sacroiliac (SI) Joint Fixation Test:  Negative   Hip Flexor Tightness:  Negative   Hamstring Tightness:  Positive.      Diagnostic studies:  MRI KNEE LEFT performed at [facility] on 10/13/23     IMPRESSION MRI KNEE LEFT:  Intact menisci, ligaments and tendons  Underlying degenerative changes as follows: Mild chondral thinning of the weightbearing aspect of the lateral tibial plateau, mild thinning of the articular cartilage of the weightbearing aspects of the medial femoral condyle and tibial plateau, full thickness cartilage loss of the medial patellar facet and high-grade chondral loss of the cartilage of the lateral facet with subchondral signal change noted laterally.  Joint effusion - small suprapatellar  Diffuse subcutaneous soft tissue edema  --------------------------  MRI KNEE RIGHT performed at  [facility] on 10/13/23     IMPRESSION:MRI KNEE RIGHT:    IT band syndrome as follows: Edema like signal in between the IT band and lateral femoral condyle compatible with IT band friction syndrome.  Complex tear of the posterior horn and body of the medial meniscus   Osteoarthritis of the medial patellofemoral compartments  Joint effusion - moderate sized suprapatellar  Subcutaneous soft tissue edema     ----------------------  Standing erect pelvis x-ray shows the following:  Left leg shorter than right leg at the iliac crest by 6 millimeters mm  Right leg shorter than left leg at Sacral Base by 6 millimeters mm  Left leg shorter than right leg at midline Femoral Heads by 4 mm  Overall difference left leg shorter than right leg by approximately 4 millimeters                    PROCEDURE: PELVIS 1 OR 2 VIEW - TXR 0039  STUDY: PELVIS 1 OR 2 VIEW 7/7/2023 8:15 am     IMPRESSION PELVIS XR:  Normal erect AP view of the pelvis     Original Interpreting Physician: ISRRAEL STOVER M.D.  Original Transcribed by/Date: [facility] Jul 7 2023 7:42A  ----------------------------------------------------  PROCEDURE: KNEE BI MIN 4 VIEW - TXR 0220  STUDY: KNEE BI MIN 4 VIEW 7/7/2023 8:15 am     IMPRESSION BILATERAL KNEE XR:  There is narrowing of the patellofemoral and medial compartments of each knee with the medial compartmental narrowing more pronounced on the right than on the left. There is mild osteophytosis of each knee observed as well.     Original Interpreting Physician: ISRRAEL STOVER M.D.  Original Transcribed by/Date: [facility] Jul 7 2023 7:42A  ----------------------------------------------------  PROCEDURE: LOWER LEG RT NON VASC LIMITED - WUS [id number]  STUDY:LOWER LEG RT NON VASC LIMITED; 6/16/2023 3:49 pm     IMPRESSION RLE US:  Normal ultrasound of the right popliteal fossa.     Original Interpreting Physician: ABBY PALMA M.D.  Original Transcribed by/Date: [facility] Jun 16 2023 3:04P      Assessment   1. Acute pain of left  knee        2. Primary osteoarthritis of left knee        3. Effusion of left knee        4. Patellofemoral disorders, left knee        5. Acute medial meniscal injury of left knee, subsequent encounter        6. Patellar tendinitis, left knee        7. Iliotibial band syndrome of left side        8. Acute pain of right knee        9. Effusion of right knee        10. Primary osteoarthritis of right knee        11. Acute medial meniscal injury of right knee, subsequent encounter        12. Patellofemoral disorders, right knee        13. Patellar tendinitis, right knee        14. Iliotibial band syndrome of right side        15. Hamstring tightness        16. Leg length discrepancy        17. Primary osteoarthritis of both knees        18. Patellar tendinitis of right knee        19. Valgus deformity of both feet        20. Disorder of patellofemoral joint, unspecified laterality        21. Disorders of muscle in diseases classd elswhr, unsp thigh        22. Right ankle pain, unspecified chronicity        23. Pain of lower leg, unspecified laterality        24. Achilles tendinitis of right lower extremity        25. Strain of Achilles tendon, unspecified laterality, subsequent encounter            Treatment or Intervention:  May continue to alternate ice and moist heat as needed  Once again, reviewed degenerative joint disease of the knees, patellofemoral tracking syndrome, and/or patellar tendinitis as well as meniscus injury in detail with the patient to the level of their understanding  Stressed the importance of continuing to perform home exercises previously learned in Physical Therapy daily   Once again, reviewed home exercises to be performed by the patient routinely  Once again stressed the importance of  continuing to wear shoes with good stability control to help with the biomechanics affecting the knees and lower extremities  Once again, stressed the importance of  continuing to wear full foot insoles to  help with the biomechanics affecting the knees and lower extremities  Once again, recommendation to continue over-the-counter vitamin-D 2 -3000+ milligrams a day, as well as a daily multivitamin.  May continue to take over-the-counter curcumin, turmeric, boswellia, as well as egg shell membrane as directed to aid with joint inflammation.  May continue to take over-the-counter Move Free for joint health.  Patient advised regarding the risks and/or potential adverse reactions and/or side effects of any prescribed medications along with any over-the-counter medications or any supplements used. Patient advised to seek immediate medical care if any adverse reactions occur. The patient and/or patient(s) parent(s) verbalized their understanding  Once again, discussed in detail with the patient to the level of their understanding the possibility in the future of regenerative injections   Once again, reviewed in detail about exercise modifications and how to avoid repetitive overuse his knees.  Once again, reviewed different dietary modifications, including intermittent fasting to aid with weight management  Stressed the importance of continuing to wear 4 millimeter heel lift placed into his left shoe to accommodate leg-length discrepancy found on standing erect pelvis x-ray  Performed Euflexxa injection #3 (NS) into the patients BILATERAL knees under ultrasound. The patient tolerated the procedure well and without any adverse effects. Discharge instructions for VISCOSUPPLEMENTATION injections were reviewed in detail with the patient to the level of their understanding; a copy of these instructions were provided to the patient at the time of this office visit.  Follow-up      Please note that this report has been produced using speech recognition software.  It may contain errors related to grammar, punctuation or spelling.  Electronically signed, but not reviewed.  Jair Woodruff D.O. FAOASM, Director of Sports Medicine        KOKO HORTON on 6/27/24 at 5:48 PM.     Jair Woodruff DO, FAOASM

## 2024-06-28 ENCOUNTER — APPOINTMENT (OUTPATIENT)
Dept: SPORTS MEDICINE | Facility: CLINIC | Age: 44
End: 2024-06-28
Payer: COMMERCIAL

## 2024-06-28 DIAGNOSIS — M25.571 RIGHT ANKLE PAIN, UNSPECIFIED CHRONICITY: ICD-10-CM

## 2024-06-28 DIAGNOSIS — S86.019D: ICD-10-CM

## 2024-06-28 DIAGNOSIS — M25.561 ACUTE PAIN OF RIGHT KNEE: ICD-10-CM

## 2024-06-28 DIAGNOSIS — S83.8X1D ACUTE MEDIAL MENISCAL INJURY OF RIGHT KNEE, SUBSEQUENT ENCOUNTER: ICD-10-CM

## 2024-06-28 DIAGNOSIS — M76.51 PATELLAR TENDINITIS OF RIGHT KNEE: ICD-10-CM

## 2024-06-28 DIAGNOSIS — S83.8X2D ACUTE MEDIAL MENISCAL INJURY OF LEFT KNEE, SUBSEQUENT ENCOUNTER: ICD-10-CM

## 2024-06-28 DIAGNOSIS — M17.0 PRIMARY OSTEOARTHRITIS OF BOTH KNEES: ICD-10-CM

## 2024-06-28 DIAGNOSIS — M17.11 PRIMARY OSTEOARTHRITIS OF RIGHT KNEE: ICD-10-CM

## 2024-06-28 DIAGNOSIS — M22.2X9 DISORDER OF PATELLOFEMORAL JOINT, UNSPECIFIED LATERALITY: ICD-10-CM

## 2024-06-28 DIAGNOSIS — M79.669 PAIN OF LOWER LEG, UNSPECIFIED LATERALITY: ICD-10-CM

## 2024-06-28 DIAGNOSIS — M17.12 PRIMARY OSTEOARTHRITIS OF LEFT KNEE: ICD-10-CM

## 2024-06-28 DIAGNOSIS — M22.2X2 PATELLOFEMORAL DISORDERS, LEFT KNEE: ICD-10-CM

## 2024-06-28 DIAGNOSIS — M22.2X1 PATELLOFEMORAL DISORDERS, RIGHT KNEE: ICD-10-CM

## 2024-06-28 DIAGNOSIS — M63.859: ICD-10-CM

## 2024-06-28 DIAGNOSIS — M25.461 EFFUSION OF RIGHT KNEE: ICD-10-CM

## 2024-06-28 DIAGNOSIS — M21.70 LEG LENGTH DISCREPANCY: ICD-10-CM

## 2024-06-28 DIAGNOSIS — M25.562 ACUTE PAIN OF LEFT KNEE: ICD-10-CM

## 2024-06-28 DIAGNOSIS — M25.462 EFFUSION OF LEFT KNEE: ICD-10-CM

## 2024-06-28 DIAGNOSIS — M76.31 ILIOTIBIAL BAND SYNDROME OF RIGHT SIDE: ICD-10-CM

## 2024-06-28 DIAGNOSIS — M76.51 PATELLAR TENDINITIS, RIGHT KNEE: ICD-10-CM

## 2024-06-28 DIAGNOSIS — M76.52 PATELLAR TENDINITIS, LEFT KNEE: ICD-10-CM

## 2024-06-28 DIAGNOSIS — Q66.6 VALGUS DEFORMITY OF BOTH FEET: ICD-10-CM

## 2024-06-28 DIAGNOSIS — M76.61 ACHILLES TENDINITIS OF RIGHT LOWER EXTREMITY: ICD-10-CM

## 2024-06-28 DIAGNOSIS — M62.89 HAMSTRING TIGHTNESS: ICD-10-CM

## 2024-06-28 DIAGNOSIS — N52.01 ERECTILE DYSFUNCTION DUE TO ARTERIAL INSUFFICIENCY: Primary | ICD-10-CM

## 2024-06-28 DIAGNOSIS — M76.32 ILIOTIBIAL BAND SYNDROME OF LEFT SIDE: ICD-10-CM

## 2024-06-28 RX ORDER — SILDENAFIL 100 MG/1
100 TABLET, FILM COATED ORAL DAILY PRN
COMMUNITY
End: 2024-06-28 | Stop reason: SDUPTHER

## 2024-07-05 ENCOUNTER — APPOINTMENT (OUTPATIENT)
Dept: CARDIOLOGY | Facility: CLINIC | Age: 44
End: 2024-07-05
Payer: COMMERCIAL

## 2024-07-12 ENCOUNTER — APPOINTMENT (OUTPATIENT)
Dept: CARDIOLOGY | Facility: CLINIC | Age: 44
End: 2024-07-12
Payer: COMMERCIAL

## 2024-07-12 ENCOUNTER — APPOINTMENT (OUTPATIENT)
Dept: SPORTS MEDICINE | Facility: CLINIC | Age: 44
End: 2024-07-12
Payer: COMMERCIAL

## 2024-07-12 VITALS
OXYGEN SATURATION: 98 % | BODY MASS INDEX: 47.74 KG/M2 | HEIGHT: 68 IN | HEART RATE: 74 BPM | WEIGHT: 315 LBS | TEMPERATURE: 98.6 F | DIASTOLIC BLOOD PRESSURE: 78 MMHG | RESPIRATION RATE: 18 BRPM | SYSTOLIC BLOOD PRESSURE: 126 MMHG

## 2024-07-12 DIAGNOSIS — E78.2 MIXED HYPERLIPIDEMIA: ICD-10-CM

## 2024-07-12 DIAGNOSIS — I10 PRIMARY HYPERTENSION: Primary | ICD-10-CM

## 2024-07-12 DIAGNOSIS — I31.39 PERICARDIAL EFFUSION (HHS-HCC): ICD-10-CM

## 2024-07-12 DIAGNOSIS — I25.10 CORONARY ARTERY DISEASE INVOLVING NATIVE CORONARY ARTERY OF NATIVE HEART WITHOUT ANGINA PECTORIS: ICD-10-CM

## 2024-07-12 PROCEDURE — 3062F POS MACROALBUMINURIA REV: CPT | Performed by: INTERNAL MEDICINE

## 2024-07-12 PROCEDURE — 3078F DIAST BP <80 MM HG: CPT | Performed by: INTERNAL MEDICINE

## 2024-07-12 PROCEDURE — 1036F TOBACCO NON-USER: CPT | Performed by: INTERNAL MEDICINE

## 2024-07-12 PROCEDURE — 3052F HG A1C>EQUAL 8.0%<EQUAL 9.0%: CPT | Performed by: INTERNAL MEDICINE

## 2024-07-12 PROCEDURE — 99214 OFFICE O/P EST MOD 30 MIN: CPT | Performed by: INTERNAL MEDICINE

## 2024-07-12 PROCEDURE — 3074F SYST BP LT 130 MM HG: CPT | Performed by: INTERNAL MEDICINE

## 2024-07-12 PROCEDURE — 3048F LDL-C <100 MG/DL: CPT | Performed by: INTERNAL MEDICINE

## 2024-07-12 PROCEDURE — 4010F ACE/ARB THERAPY RXD/TAKEN: CPT | Performed by: INTERNAL MEDICINE

## 2024-07-12 ASSESSMENT — LIFESTYLE VARIABLES
AUDIT-C TOTAL SCORE: 0
HOW OFTEN DO YOU HAVE SIX OR MORE DRINKS ON ONE OCCASION: NEVER
HOW OFTEN DO YOU HAVE A DRINK CONTAINING ALCOHOL: NEVER
SKIP TO QUESTIONS 9-10: 1
HOW MANY STANDARD DRINKS CONTAINING ALCOHOL DO YOU HAVE ON A TYPICAL DAY: PATIENT DOES NOT DRINK

## 2024-07-12 ASSESSMENT — PAIN SCALES - GENERAL: PAINLEVEL: 0-NO PAIN
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
## 2024-07-12 NOTE — PROGRESS NOTES
Verbal consent of the patient and/or verbal parental consent for patients under the age of 18 have been obtained to conduct a physical examination at this office visit.    Established patient  History Of Present Illness  07/15/24 Ned Ernandez is a 43 y.o. male who presents for Euflexxa #3 injection in to his BILATERAL knees. He performs his home strengthening and stretching exercises previously learned in Physical Therapy. He continues to experience a diffuse ache throughout both knees noting ascending stairs causes his pain to exacerbate. He rates his pain at 2/10 today citing symptoms and ROM have improved with injections. He is not presently utilizing any pain management interventions at this time.   Once again is very happy with the results of the injections and definitely would like to continue on with them in the future to try to avoid surgical intervention.    All previous Progress Notes and imaging results related to this patients chief complaint have been reviewed in preparation for this examination.    Past Medical History  He has a past medical history of Acute myocardial infarction (Multi) (09/13/2023), CAD (coronary artery disease) (11/24/2022), Chest pain (02/07/2024), Diabetes mellitus type 1 (Multi), MI (myocardial infarction) (Multi), Mixed hyperlipidemia (09/13/2023), Osteoarthritis (10/2023), Pericardial effusion ([facility]) (02/07/2024), Primary hypertension (09/13/2023), and Varicose veins of lower extremity with inflammation (09/13/2023).    Surgical History  He has a past surgical history that includes Cardiac catheterization (11/2014); Arterial bypass surgry (12/2014); Coronary stent placement (08/31/2020); IR ablation vein (11/2022); and Spider vein injection (Right, 11/2022).     Social History  He reports that he has never smoked. He has never been exposed to tobacco smoke. He has never used smokeless tobacco. He reports that he does not drink alcohol and does not use drugs.    Family  History  Family History   Problem Relation Name Age of Onset    No Known Problems Mother      No Known Problems Father      No Known Problems Sister      No Known Problems Son      Cancer Maternal Grandmother      No Known Problems Maternal Grandfather      Heart attack Paternal Grandmother      Heart disease Paternal Grandfather       Allergies  Cat dander and House dust    Historical Clinical Intake  July 7, 2023 Verbal consent of the patient and/or verbal parental consent for patients under the age of 18 have been obtained to conduct a physical examination at this office visit. Ned is a 42 year old male who was graciously referred into the office by Dr. López who present for an initial evaluation of his BILATERAL knee pain R>L. He reports a history of RIGHT knee pain with an injury occurring at 18 years old while playing racket ball. He describes his RIGHT knee pain as a throbbing ache proximal to the posterolateral aspect of his patella that will radiate into his right Achilles tendon. He states his LEFT knee pain has gotten significantly worse since his compensating for his right knee occurs along the medial aspect which he also describes as a throbbing ache. Ned has positive crepitus with reported popping with ambulation. He denies any numbness, tingling or instability. He rates his pain at 6/10 today. He takes OTC Aleve alternating with OTC Tylenol and elevates his BLE for pain management with minimal to no relief. His PCP, Dr. López, ordered an US of his RLE which was negative for DVT prior to this examination. I stressed to him the importance based off his medical history of having open heart surgery already that he should not be taking any non steroid anti-inflammatory drugs whatsoever he should only be taking Tylenol. We talked in detail about extra supplements that he could take. We also talked in detail about a significant amount of weight gain that he needs to start being very attentive to is very  active and we talked about modifications and exercises that he can do regularly.  ------------------------  September 20, 2023 Verbal consent of the patient and/or verbal parental consent for patients under the age of 18 have been obtained to conduct a physical examination at this office visit. Ned is a newly established patient who is present here today for a reevaluation of his BILATERAL knees. Ned states that he has not noticed any significant change in symptoms; and he continues to c/o bony pain along the posterior medial aspect of both knees He is taking Curcumin/Tumeric and OTC Move Free as directed previously and is also taking Tylenol Arthritis with no change in symptoms. He is unable to take NSAIDS due to history of heart surgery. He has completed 6-8 weeks of physical therapy with no significant improvement in knee pain and is continuing therapy and performing his home exercise program. He was referred back to our office for reevaluation as he was not improving. He states that his RIGHT knee hurts more than his LEFT. Pain is currently 4/10 in right posterior lateral knee and 1/10 on the LEFT knee. Pain is worse with climbing stairs, squatting, and prolonged walking. He was referred back to office by PT because not getting any better and actuaaly getting worse.  ------------------------------------------------------  10/30/23Ned STEF Ernandez is a 43 y.o. male who presents to review his BILATERAL knee MRI which was performed at [facility]. Pt continues to have bilateral knee pain R >L. Pt reports intermittent pain on lateral and posterior knees. Worse with standing for long periods of time and with exertion. Currently treating pain with Tylenol Arthritis, Move Free, and turmeric-curcumin supplements and ice. Pt has been wearing good supportive shoes and inserts and wears compression stockings. Pt completed physical therapy and is continuing with HEP. Pt feels pain has lessened in frequency since  starting supplements and doing PT. Walking up and down stairs causes pain as he has to go one foot at a time. He is an  and sits typically all day.  He would like to start injections as soon as possible.  He says that since he is plateaued so much during physical therapy and it has not helped much she is hoping that the injections will help.  I told them are going to have to get approval for Zilretta injections since he is diabetic for his right knee for his distal IT band syndrome friction syndrome.  Also had to get approval for viscosupplementation injections as well.  We also discussed he has to talk to his cardiologist who prescribed his blood thinner to see what they want us to do for stopping before injections and keeping him off of it after injections.   -------------------------------  11/28/23 Ned Ernandez is a 43 y.o. male who presents for a corticosteroid injection into the patients BILATERAL knees. Patient continues to experience pain in both knees around all sides but the lateral and posterior side are worse. He states he constantly feels the bones of both knees popping. Pain is worse when moving from standing to sitting position and ascending stairs. Pain is also worse without shoes. He treats the pain with Tylenol when needed. He continues to wear good supportive footwear with inserts with his 4mm heel lift in LEFT shoe. He completed physical therapy but continues to participate in HEP. He also continues to take Move Free an turmeric supplements. Patient discussed stopping his Xarelto and aspirin with cardiologist, Dr. Alexis during the course of the injections.   I told him we try to get approval for the Zilretta injections however his insurance did not allow it so he had a corticosteroid injection and they wanted to see how that affected his blood sugars.  I told him to monitor his blood sugars regularly and he may have to use some insulin.     --------------------------------------------------  12/11/23 Ned Ernandez is a 43 y.o. male who presents for Euflexxa Injection 1/3 injection into the patients BILATERAL knees. Patient reports resolution of BILATERAL knee pain until yesterday when he reports placing a stress on the outside of his RIGHT knee stepping out of a car. Patient notes 1/10 pain in the lateral aspect of his RIGHT knee that was not significant enough to take NSAIDs to resolve, but is still present this morning. Patient received corticosteroid injection to bilateral knees two weeks ago. He was denied Zilretta injections by his insurance despite being diabetic. He reports that his pain corticosteroid injection improved significantly after the injections; however, he battled high blood glucose levels. He reports that he needed to take 3-4 times the amount of normal insulin to keep his sugars between 350-400. He states that this lasted 4 days before his sugar levels returned to normal just above 100.    -------------------------------------------------------  12/27/23 Ned Ernandez is a 43 y.o. male who presents for Euflexxa Injection 2/3 injection into his BILATERAL knees. Pt states that he has had noticeable improvement since beginning injections. There is intermittent sharp mid right patellar pain and posterior pain which occurs less frequently along his mid patella. Upon walking up stairs pain is exacerbated. He is not currently using any pain management interventions at this time.   Overall he is very happy with the results of the injections that he feels are helping his knees tremendously   -------------------------------------------------------  1/3/24  Ned Ernandez is a 43 y.o. male who presents for Euflexxa Injection 3/3 injection into the patients BILATERAL Knees. He states that overall he is doing significantly better since receiving Euflexxa injections into his bilateral knees. He denies any pain, instability, locking, catching,  or swelling in either knee. He continues to take Tylenol and Ibuprofen for pain management as needed and is also continuing to perform his home exercise program as previously directed.  He rates his bilateral knee pain at 0/10 today.   he says he is very happy with the results of the injections and how well his knees are feeling.  He is hoping honestly he will be able to continue on getting these injections in the future and to avoid surgical intervention.  -------------------------------------------------------  04/03/24 Ned Ernandez is a 43 y.o. male who presents for follow up of BILATERAL knees. He reports significant improvement to his knee pain from before doing the injections. On rare occasion he will feel some minimal pain to the lateral aspect of his RIGHT knee. Additionally he will feel pain when ascending stairs in the LEFT knee. Overall his pain is very infrequent and minimal and so he has not needed to treat his pain with any pain management interventions. He denies any popping, catching, locking or instability of either knee. He continues to do his home exercise program that he previously learned in Physical Therapy every day. Ned also wears good supportive shoes with full foot insoles and a heel lift in his LEFT shoe as well as his compression stockings to promote good blood flow and proper body mechanics. He has also been taking Move Free and turmeric supplementation. He is extremely happy with his improvement with the injections and would like to pursue another round in the future to avoid surgical intervention so he can enjoy riding his bike in the summer.   -------------------------------------------------------  06/10/24 Ned Ernandez is a 43 y.o. male who presents for Euflexxa #1 (NS) injection into his BILATERAL knees. He continues to perform his home strengthening and stretching exercises previously learned in Physical Therapy. He reports approximately x1-2 weeks following his last  evaluation onset of sharp pains along the inferolateral aspect of his LEFT > Right patella, most notable when ascending stairs. He cites living in a home where he is unable to avoid use of stairs on a daily basis. He denies any numbness, tingling or radiculopathy at this time. He denies pain at rest, but says when present pain can reach 5/10. He is not utilizing any pain management interventions at this time. He wears bilateral compression stockings, full foot insoles, appropriate heel lift in his LEFT shoe and supportive footwear as previously recommended.  he still is happy with the injections and will like to try to avoid surgical intervention long-term wise.        Review of Systems:  CONSTITUTIONAL:   Negative for weight change, loss of appetite, fatigue, weakness, fever, chills, night sweats, headaches .           HEENT:   Negative for cold, cough, sore throat, sinus pain, swollen lymph nodes.           OPHTHALMOLOGY:   Negative for diminished vision, blurred vision, loss of vision, double vision.           ALLERGY:   Negative for runny nose, scratchy throat, sinus congestion, rash, facial pressure, nasal congestion, post-nasal drip.           CARDIOLOGY:   Negative for chest pain, palpitations, murmurs, irregular heart beat, shortness of breath, leg edema, dyspnea on exertion, fatigue, dizziness.           RESPIRATORY:   Negative for chest pain, shortness of breath, swelling of the legs, asthma/copd, chest congestion, pain with breathing .           GASTROENTEROLOGY:   Negative for nausea, vomitting, heartburn, constipation, diarrhea, blood in stool, change in bowel habits, black stool.           HEMATOLOGY/LYMPH:   Negative for fatigue, loss of appetitie, easy bruising, easy bleeding, anemia, abnormal bleeding, slow healing.           ENDOCRINOLOGY:   Negative for polyuria, polydipsia, polyphagia, fatigue, weight loss, weight gain, cold intolerance, heat intolerance, diabetes.           MUSCULOSKELETAL:    Positive  for Bilateral Knee pain         DERMATOLOGY:   Negative for rash, bruising.           NEUROLOGY:   Negative for tingling, numbness, gait abnormality, paresthesias, weakness, sciatica.          General Examination:  GENERAL APPEARANCE: appears well, pleasant, and cooperative, NAD.   HEENT: normal, unremarkable.   NECK: supple.   HEART: RRR, normal S1S2.   LUNGS: clear to auscultation bilaterally.   ABDOMEN: soft, NT/ND, BS present.   EXTREMITIES: no cyanosis, clubbing.   SKIN: no rash or cellulitis.   NEUROLOGIC EXAM: awake, A&O x 3, CN's II-XII grossly intact.   PSYCH: good eye contact, appropriate mood and affect .              General (KNEE):     Continued improvement since last week and starting joint lubrication injections again  Location:  BILATERAL  LEFT > RIGHT  Erythema: Negative.   Edema:   Negative   Effusion: Negative.   Warmth: Negative.   Percussion Test:  Negative:  Tuning Fork Test:  Negative:   Ecchymosis/Bruising: Negative.   Abrasions: Negative.   Palpation: Negative.  Baker's Cyst: Negative.   Orientation: Symmetrical.   Range of Motion: FROM, Not Painful  Knee Flexion ( 0-135 degrees)  Knee Extension ( 0-15 degrees)  Hip Flexion (120-130 degrees)  Hip Extension (10-20 degrees)  Hip ABduction towards body (45 degrees)  Hip ADduction away from body (30 degrees)  Internal Rotation (45 degrees)          External Rotation (50 degrees).         Muscle Strength:   Continued improvement since last week and starting joint lubrication injections again  +5/+5 Quadricep Extension  +5/+5 Hamstring Flexion  +5+5 Hip Flexion  +5+5 Hip Extension  +5/+5 Hip ABduction away from body  +5/+5 Hip ADduction towards body  +5/+5 Hip Internal Rotation  +5/+5 Hip External Rotation  +5+5 IT-Band  +5+5 Gastrocnemius  +5+5 Soleus.      DTR/Neurological:   +2/+4 Patellar (L4)  +2/+4 Posterior Tibialis Reflex and Medial Hamstrings (L5)  +2/+4 Achilles (S1).            Sensation/Neurological Lumbar:    Negative,  Sensation intact:  L1: Low back, hips, and groin  L2: Low back and front of inside of upper leg/thigh  L3: Low back and front of upper leg/thigh  L4: Low back, front of upper leg/thigh, front of lower leg/calf, front of medial area of knee, and inside of ankle  L5: Low back, front and lateral knee, front and outside of lower leg/calf, top and bottom of foot and first four toes especially big toe.      Sensation/Neurological Sacrum:   Negative, Sensation Intact, 2 -Point Discrimination Test: Negative  S1: low back, back of upper leg/thigh, back and inside of lower leg, calf and little toe  S2: Buttocks, genitals, back of upper leg/thigh and calves  S3: Buttocks and genitals  S4: Buttocks  S5: Buttocks.         Pain with Squat:  Positive   Continued improvement since last week and starting joint lubrication injections again  Pain with Sumo Squat:  Positive   Continued improvement since last week and starting joint lubrication injections again  Vascular: +2/+4 Femoral, +2/+4 Dorsalis Pedis, +2/+4 Posterior Tibial  Capillary Refill less than 2 seconds.      Leg length discrepancy:  Positive: LEFT leg shorter than the Right by approximately 4 mm verified with standing erect pelvis x-ray   Feet/Foot: Positive  Valgus Foot Deformity (Pes Planus) BILATERAL.      Knee - ACL:  Anterior Drawer Test: Negative.   Lachman Test: Negative.   Prone Lachman Test: Negative.   Lelli Test: Negative.   Pivot Shift Test: Negative.   Crossover Test: Negative.      KNEE - FAT PAD:  Squeeze Test Knee Bent going into extension: Negative.      KNEE - IT BAND:   Belkys Test:  Negative:   Noble Test: Negative:         KNEE - MCL / LCL:   Valgus Stress Test:  90 degrees: Negative, 20-30 degrees: Negative.   Varus Stress Test:  90 degrees: Negative, 20-30 degrees: Negative.   Apley Distraction Test:  Positive:, Medial:, MCL:.   Thessaly Test:  Negative        KNEE - MENISCUS:   Continued improvement since last week and starting joint lubrication  injections again  Apley Compression Test:  Negative  Duck Walk Test:  Negative.   Stienmann 1 Test:  Negative.   Stienmann 2 Test:  Negative.   Porfirio Test:  Negative  Bragard's Test:  Negative.   Bounce Home Test:  Negative.   Payr Test:  Negative.   Thessaly Test:  Negative:  Yordan's Test:  Negative.         KNEE - PATELLA:    Continued improvement since last week and starting joint lubrication injections again  Apprehension Test:   Positive.  Glide Test:  Negative.   Facet Tenderness Test:   Positive..   Medial Patellar Plica Test:  Negative.   Grind Test:   Positive.  Patella Tracking Test Positive.  Medial/Lateral Subluxation Test:  Negative.   Suppression Test:  Negative.         Knee - PCL/POSTERIOR LATERAL CORNER:  Posterior Drawer Test: Negative.   Jimenes 90/90 (Sag Sign) Test: Negative.   Dial Test: Negative.   Reverse Lachman Test: Negative.   Quad Activation Test: Negative.   ER / Recurvatum Test: Negative.   Reverse Pivot Shift Test: Negative.      KNEE - POPLITEUS:  Jean Pierre's Test: Negative.      KNEE - QUADRICEPS: Continued improvement since last week and starting joint lubrication injections again  just future  Dreyer Test: Negative.   VMO Test: Negative.   VLO Test: Negative.      Hip/Pelvis - Sacrum:  Leg Length Supine:  Negative  Leg Length Supine to Seated (Derbolowsky Sign):  Negative  Standing Flexion Test:  Negative.   Seated Flexion Test:  Negative   Spring Test:  Negative   Sacral Somatic Dysfunction:  Negative   Sacroiliac (SI) Joint Fixation Test:  Negative   Hip Flexor Tightness:  Negative   Hamstring Tightness:  Positive.      Diagnostic studies:  MRI KNEE LEFT performed at [facility] on 10/13/23     IMPRESSION MRI KNEE LEFT:  Intact menisci, ligaments and tendons  Underlying degenerative changes as follows: Mild chondral thinning of the weightbearing aspect of the lateral tibial plateau, mild thinning of the articular cartilage of the weightbearing aspects of the medial  femoral condyle and tibial plateau, full thickness cartilage loss of the medial patellar facet and high-grade chondral loss of the cartilage of the lateral facet with subchondral signal change noted laterally.  Joint effusion - small suprapatellar  Diffuse subcutaneous soft tissue edema  --------------------------  MRI KNEE RIGHT performed at [facility] on 10/13/23     IMPRESSION:MRI KNEE RIGHT:    IT band syndrome as follows: Edema like signal in between the IT band and lateral femoral condyle compatible with IT band friction syndrome.  Complex tear of the posterior horn and body of the medial meniscus   Osteoarthritis of the medial patellofemoral compartments  Joint effusion - moderate sized suprapatellar  Subcutaneous soft tissue edema     ----------------------  Standing erect pelvis x-ray shows the following:  Left leg shorter than right leg at the iliac crest by 6 millimeters mm  Right leg shorter than left leg at Sacral Base by 6 millimeters mm  Left leg shorter than right leg at midline Femoral Heads by 4 mm  Overall difference left leg shorter than right leg by approximately 4 millimeters                    PROCEDURE: PELVIS 1 OR 2 VIEW - TXR 0039  STUDY: PELVIS 1 OR 2 VIEW 7/7/2023 8:15 am     IMPRESSION PELVIS XR:  Normal erect AP view of the pelvis     Original Interpreting Physician: ISRRAEL STOVER M.D.  Original Transcribed by/Date: [facility] Jul 7 2023 7:42A  ----------------------------------------------------  PROCEDURE: KNEE BI MIN 4 VIEW - TXR 0220  STUDY: KNEE BI MIN 4 VIEW 7/7/2023 8:15 am     IMPRESSION BILATERAL KNEE XR:  There is narrowing of the patellofemoral and medial compartments of each knee with the medial compartmental narrowing more pronounced on the right than on the left. There is mild osteophytosis of each knee observed as well.     Original Interpreting Physician: ISRRAEL STOVER M.D.  Original Transcribed by/Date: [facility] Jul 7 2023  7:42A  ----------------------------------------------------  PROCEDURE: LOWER LEG RT NON VASC LIMITED - [facility] [id number]  STUDY:LOWER LEG RT NON VASC LIMITED; 6/16/2023 3:49 pm     IMPRESSION RLE US:  Normal ultrasound of the right popliteal fossa.     Original Interpreting Physician: ABBY PALMA M.D.  Original Transcribed by/Date: [facility] Jun 16 2023 3:04P    Assessment   1. Acute pain of left knee        2. Primary osteoarthritis of left knee        3. Effusion of left knee        4. Patellofemoral disorders, left knee        5. Acute medial meniscal injury of left knee, subsequent encounter        6. Patellar tendinitis, left knee        7. Iliotibial band syndrome of left side        8. Effusion of right knee        9. Acute pain of right knee        10. Primary osteoarthritis of right knee        11. Acute medial meniscal injury of right knee, subsequent encounter        12. Patellofemoral disorders, right knee        13. Patellar tendinitis, right knee        14. Iliotibial band syndrome of right side        15. Hamstring tightness        16. Leg length discrepancy        17. Valgus deformity of both feet          L Inj/Asp: bilateral knee on 7/15/2024 8:38 AM  Indications: pain and diagnostic evaluation  Details: 22 G needle, ultrasound-guided  Medications (Right): 2 mL sodium hyaluronate 10 mg/mL(mw 2.4 -3.6 million)  Medications (Left): 2 mL sodium hyaluronate 10 mg/mL(mw 2.4 -3.6 million)  Outcome: tolerated well, no immediate complications  Procedure, treatment alternatives, risks and benefits explained, specific risks discussed. Consent was given by the patient. Immediately prior to procedure a time out was called to verify the correct patient, procedure, equipment, support staff and site/side marked as required.            Procedure   Time Out (5 Minutes): Yes  Patient Name: Ned Ernandez  YOB: 1980  Procedure: Euflexxa Injection 3/3  Needed Supplies Available: Correct Supplies  Laterality:  BILATERAL knee  Site Verified and Marked: Correct Site(s) Marked  Timeout Performed by Provider: Dr. Jair Woodruff D.O.  Staff Initials: ALZ    Patient is informed and consent has been signed by the patient if over 18 years old., Patient parent and/or legal guardian is informed and consent has been signed., Patient and/or parent and/or legal guardian accept all risks, benefits, and possible complications associated with procedure(s) and/or manipulation(s) and/or injection(s)., Patient and/or parent and/or legal guardian deny patient allergy or known complications with any of the medications, instruments, products, and/or techniques used., All questions and concerns were answered and/or addressed with the patient and/or parent and/or legal guardian., The patient and/or parent and/or legal guardian agree to proceed with the procedure(s) and/or manipulation(s) and/or injection(s)., Prep: The area was cleansed with a mixture of equal parts rubbing alcohol 70% and Hibclens., Utilizing clean technique, the injection site(s) were marked with a skin marker., and Injection site(s) were anesthestized with topical analgesic spray prior to injection.    Performed regenerative Euflexxa Injection 3/3 into the patients BILATERAL knee, under ultrasound.  Euflexxa (1%)    Band-aid and/or compressive bandage was placed over the injection site(s). After the injection(s) performed osteopathic manipulation therapy to the affected area(s) to help increase blood flow to aid with the healing process as well as circulation of the medication. The patient tolerated the procedure well without any complications. The patient was instructed to gently massage the treatment site(s) as well as to apply moist heat to the affected area(s). Additionally, the patient was instructed to contact the office immediately with any complications or concerns.    The Nurse, Silvana, was present in the room during the entire procedure.    The following discharge  instructions were reviewed in detail with the patient to the level of their understanding:    PAIN:  A mild to moderate amount of discomfort, tenderness, stiffness, and achiness-caused by the inflammation of the area can be expected for a few days after the injection. This is normal and good as the inflammation is an important part of the healing process.    SKIN: Due to the numbing spray used, you may develop a red, itchy, scaly rash in the area that was sprayed. Should your skin become itchy, wash the area with mild soap and warm water. If needed, you may apply topical over-the-counter Hydrocortisone cream to alleviate any itchiness. AVOID scratching due to risk of infection.,     BATHING/SWIMMING: You may shower after your procedure with regular soap and water, but no baths, no swimming, and no hot tubs for 3-4 days after the procedure.,     ACTIVITIES:  Immediately following the injection, you may resume daily activities (such as work) and light exercise. We suggest that you refrain from strenuous activity and heavy lifting, particularly the injured body part, for a week post-procedure, but sometimes this can change as well.    MOIST HEAT/ICE:  Moist heat may be used on the injection area. NO ICE.  MEDICATION: You may continue your prescribed medications and any over-the-counter supplements; however, it is not recommended to use aspirin or anti-inflammatories (Motrin, Advil, Aleve, Ibuprofen, Naproxen etc.) for up to 2 weeks post procedure. Tylenol Extra Strength, Tylenol Arthritis and/or any prescribed narcotics or muscle relaxants are OK to take.    APPOINTMENTS: You should have a follow-up appointment with Dr. Woodruff scheduled 1-3 weeks as recommended after your procedure for your next round of injections or to follow-up after you have completed your injection series. Please schedule your follow-up appointment on your way out after your procedure, or call the office to schedule these appointments as soon as  possible.    PRECAUTIONS: Smoking, caffeine, alcohol, sex and anti-inflammatories post-procedure may reduce the effectiveness of Prolotherapy/Neural Prolotherapy/Prolozone injections. Early, rare post procedure problems that can be concerning are as follows: an increase in pain not relieved by medication (over the counter or prescription), a temperature above 101 degrees, bleeding or progressive, extreme swelling, or numbness.     CALL THE OFFICE OR GO TO THE EMERGENCY ROOM IF ANY OF THESE SYMPTOMS OCCUR.   If you have any questions or problems, please call our office at [phone]       Treatment or Intervention:  May continue to alternate ice and moist heat as needed  Once again, reviewed degenerative joint disease of the knees, patellofemoral tracking syndrome, and/or patellar tendinitis as well as meniscus injury in detail with the patient to the level of their understanding  Stressed the importance of continuing to perform home exercises previously learned in Physical Therapy daily   Once again, reviewed home exercises to be performed by the patient routinely  Once again stressed the importance of  continuing to wear shoes with good stability control to help with the biomechanics affecting the knees and lower extremities  Once again, stressed the importance of  continuing to wear full foot insoles to help with the biomechanics affecting the knees and lower extremities  Once again, recommendation to continue over-the-counter vitamin-D 2 -3000+ milligrams a day, as well as a daily multivitamin.  May continue to take over-the-counter curcumin, turmeric, boswellia, as well as egg shell membrane as directed to aid with joint inflammation.  May continue to take over-the-counter Move Free for joint health.  Patient advised regarding the risks and/or potential adverse reactions and/or side effects of any prescribed medications along with any over-the-counter medications or any supplements used. Patient advised to  seek immediate medical care if any adverse reactions occur. The patient and/or patient(s) parent(s) verbalized their understanding  Once again, discussed in detail with the patient to the level of their understanding the possibility in the future of regenerative injections   Once again, reviewed in detail about exercise modifications and how to avoid repetitive overuse his knees.  Once again, reviewed different dietary modifications, including intermittent fasting to aid with weight management   once again Stressed the importance of continuing to wear 4 millimeter heel lift placed into his left shoe to accommodate leg-length discrepancy found on standing erect pelvis x-ray  Performed Euflexxa injection #3 into the patients BILATERAL knees under ultrasound. The patient tolerated the procedure well and without any adverse effects. Discharge instructions for VISCOSUPPLEMENTATION injections were reviewed in detail with the patient to the level of their understanding; a copy of these instructions were provided to the patient at the time of this office visit.  Follow-up in 3 months for a reevaluation of the patients BILATERAL knees or sooner as needed     Please note that this report has been produced using speech recognition software.  It may contain errors related to grammar, punctuation or spelling.  Electronically signed, but not reviewed.  KATE Lamb, Director of Sports Medicine       KOKO HORTON on 7/15/24 at 8:37 AM.     RIO Lamb DO      injected bilateral knees with Visco supplementation of Euflexxa

## 2024-07-13 RX ORDER — SILDENAFIL 100 MG/1
TABLET, FILM COATED ORAL
Qty: 30 TABLET | Refills: 5 | Status: SHIPPED | OUTPATIENT
Start: 2024-07-13

## 2024-07-15 ENCOUNTER — OFFICE VISIT (OUTPATIENT)
Dept: SPORTS MEDICINE | Facility: CLINIC | Age: 44
End: 2024-07-15
Payer: COMMERCIAL

## 2024-07-15 VITALS
DIASTOLIC BLOOD PRESSURE: 88 MMHG | HEIGHT: 68 IN | WEIGHT: 315 LBS | HEART RATE: 84 BPM | SYSTOLIC BLOOD PRESSURE: 128 MMHG | BODY MASS INDEX: 47.74 KG/M2

## 2024-07-15 DIAGNOSIS — Q66.6 VALGUS DEFORMITY OF BOTH FEET: ICD-10-CM

## 2024-07-15 DIAGNOSIS — M62.89 HAMSTRING TIGHTNESS: ICD-10-CM

## 2024-07-15 DIAGNOSIS — S83.8X1D ACUTE MEDIAL MENISCAL INJURY OF RIGHT KNEE, SUBSEQUENT ENCOUNTER: ICD-10-CM

## 2024-07-15 DIAGNOSIS — M22.2X1 PATELLOFEMORAL DISORDERS, RIGHT KNEE: ICD-10-CM

## 2024-07-15 DIAGNOSIS — M17.11 PRIMARY OSTEOARTHRITIS OF RIGHT KNEE: ICD-10-CM

## 2024-07-15 DIAGNOSIS — M22.2X2 PATELLOFEMORAL DISORDERS, LEFT KNEE: ICD-10-CM

## 2024-07-15 DIAGNOSIS — M76.52 PATELLAR TENDINITIS, LEFT KNEE: ICD-10-CM

## 2024-07-15 DIAGNOSIS — M25.462 EFFUSION OF LEFT KNEE: ICD-10-CM

## 2024-07-15 DIAGNOSIS — M76.32 ILIOTIBIAL BAND SYNDROME OF LEFT SIDE: ICD-10-CM

## 2024-07-15 DIAGNOSIS — M25.461 EFFUSION OF RIGHT KNEE: ICD-10-CM

## 2024-07-15 DIAGNOSIS — M21.70 LEG LENGTH DISCREPANCY: ICD-10-CM

## 2024-07-15 DIAGNOSIS — M76.51 PATELLAR TENDINITIS, RIGHT KNEE: ICD-10-CM

## 2024-07-15 DIAGNOSIS — M76.31 ILIOTIBIAL BAND SYNDROME OF RIGHT SIDE: ICD-10-CM

## 2024-07-15 DIAGNOSIS — M25.561 ACUTE PAIN OF RIGHT KNEE: ICD-10-CM

## 2024-07-15 DIAGNOSIS — M17.12 PRIMARY OSTEOARTHRITIS OF LEFT KNEE: ICD-10-CM

## 2024-07-15 DIAGNOSIS — S83.8X2D ACUTE MEDIAL MENISCAL INJURY OF LEFT KNEE, SUBSEQUENT ENCOUNTER: ICD-10-CM

## 2024-07-15 DIAGNOSIS — M25.562 ACUTE PAIN OF LEFT KNEE: ICD-10-CM

## 2024-07-15 PROCEDURE — 3062F POS MACROALBUMINURIA REV: CPT | Performed by: FAMILY MEDICINE

## 2024-07-15 PROCEDURE — 2500000004 HC RX 250 GENERAL PHARMACY W/ HCPCS (ALT 636 FOR OP/ED): Mod: JZ | Performed by: FAMILY MEDICINE

## 2024-07-15 PROCEDURE — 99214 OFFICE O/P EST MOD 30 MIN: CPT | Performed by: FAMILY MEDICINE

## 2024-07-15 PROCEDURE — 3074F SYST BP LT 130 MM HG: CPT | Performed by: FAMILY MEDICINE

## 2024-07-15 PROCEDURE — 3048F LDL-C <100 MG/DL: CPT | Performed by: FAMILY MEDICINE

## 2024-07-15 PROCEDURE — 4010F ACE/ARB THERAPY RXD/TAKEN: CPT | Performed by: FAMILY MEDICINE

## 2024-07-15 PROCEDURE — 20611 DRAIN/INJ JOINT/BURSA W/US: CPT | Mod: 50 | Performed by: FAMILY MEDICINE

## 2024-07-15 PROCEDURE — 3079F DIAST BP 80-89 MM HG: CPT | Performed by: FAMILY MEDICINE

## 2024-07-15 PROCEDURE — 3052F HG A1C>EQUAL 8.0%<EQUAL 9.0%: CPT | Performed by: FAMILY MEDICINE

## 2024-07-15 PROCEDURE — 1036F TOBACCO NON-USER: CPT | Performed by: FAMILY MEDICINE

## 2024-07-15 ASSESSMENT — ENCOUNTER SYMPTOMS
LOSS OF SENSATION IN FEET: 0
OCCASIONAL FEELINGS OF UNSTEADINESS: 0
DEPRESSION: 0

## 2024-07-15 ASSESSMENT — PAIN SCALES - GENERAL
PAINLEVEL: 2
PAINLEVEL_OUTOF10: 2

## 2024-07-15 ASSESSMENT — PAIN DESCRIPTION - DESCRIPTORS: DESCRIPTORS: ACHING

## 2024-07-15 ASSESSMENT — PAIN - FUNCTIONAL ASSESSMENT: PAIN_FUNCTIONAL_ASSESSMENT: 0-10

## 2024-09-06 ENCOUNTER — OFFICE VISIT (OUTPATIENT)
Dept: PRIMARY CARE | Facility: CLINIC | Age: 44
End: 2024-09-06
Payer: COMMERCIAL

## 2024-09-06 VITALS
HEART RATE: 67 BPM | OXYGEN SATURATION: 95 % | HEIGHT: 68 IN | BODY MASS INDEX: 47.74 KG/M2 | SYSTOLIC BLOOD PRESSURE: 130 MMHG | TEMPERATURE: 97.9 F | WEIGHT: 315 LBS | DIASTOLIC BLOOD PRESSURE: 70 MMHG

## 2024-09-06 DIAGNOSIS — E66.01 CLASS 3 SEVERE OBESITY DUE TO EXCESS CALORIES WITH SERIOUS COMORBIDITY AND BODY MASS INDEX (BMI) OF 60.0 TO 69.9 IN ADULT: ICD-10-CM

## 2024-09-06 DIAGNOSIS — E10.65 TYPE 1 DIABETES MELLITUS WITH HYPERGLYCEMIA (MULTI): Primary | ICD-10-CM

## 2024-09-06 PROCEDURE — 3052F HG A1C>EQUAL 8.0%<EQUAL 9.0%: CPT | Performed by: FAMILY MEDICINE

## 2024-09-06 PROCEDURE — 3062F POS MACROALBUMINURIA REV: CPT | Performed by: FAMILY MEDICINE

## 2024-09-06 PROCEDURE — 3078F DIAST BP <80 MM HG: CPT | Performed by: FAMILY MEDICINE

## 2024-09-06 PROCEDURE — 3048F LDL-C <100 MG/DL: CPT | Performed by: FAMILY MEDICINE

## 2024-09-06 PROCEDURE — 99213 OFFICE O/P EST LOW 20 MIN: CPT | Performed by: FAMILY MEDICINE

## 2024-09-06 PROCEDURE — 4010F ACE/ARB THERAPY RXD/TAKEN: CPT | Performed by: FAMILY MEDICINE

## 2024-09-06 PROCEDURE — 3008F BODY MASS INDEX DOCD: CPT | Performed by: FAMILY MEDICINE

## 2024-09-06 PROCEDURE — 3075F SYST BP GE 130 - 139MM HG: CPT | Performed by: FAMILY MEDICINE

## 2024-09-06 RX ORDER — TIRZEPATIDE 2.5 MG/.5ML
2.5 INJECTION, SOLUTION SUBCUTANEOUS WEEKLY
Qty: 2 ML | Refills: 2 | Status: SHIPPED | OUTPATIENT
Start: 2024-09-06

## 2024-09-06 ASSESSMENT — ENCOUNTER SYMPTOMS
LOSS OF SENSATION IN FEET: 0
OCCASIONAL FEELINGS OF UNSTEADINESS: 0
DEPRESSION: 0

## 2024-09-06 ASSESSMENT — PAIN SCALES - GENERAL: PAINLEVEL: 2

## 2024-09-22 ASSESSMENT — ENCOUNTER SYMPTOMS
FEVER: 0
ENDOCRINE NEGATIVE: 1
NAUSEA: 0
DIZZINESS: 0
DIFFICULTY URINATING: 0
SHORTNESS OF BREATH: 0
DIARRHEA: 0

## 2024-09-23 NOTE — PROGRESS NOTES
"Subjective   Patient ID: Ned Ernandez is a 44 y.o. male who presents for discuss weight loss meds and discuss flu vaccine.    Patient with diabetes, type 1  He does use insulin.  He does have obesity, would like to try Mounjaro although he is a type I diabetic on insulin, we will add Mounjaro to his regimen since it is not for total diabetes control for him because he takes insulin but this could be very helpful for him to help him lose weight etc.  We discussed the possibility of having low blood sugar readings etc.  There are trials currently ongoing with these types of medications in type I diabetics         Review of Systems   Constitutional:  Negative for fever.        Also see HPI   Eyes:  Negative for visual disturbance.   Respiratory:  Negative for shortness of breath.    Cardiovascular:  Negative for chest pain.   Gastrointestinal:  Negative for diarrhea and nausea.   Endocrine: Negative.    Genitourinary:  Negative for difficulty urinating.   Skin:  Negative for rash.   Neurological:  Negative for dizziness.        No focal deficits   Psychiatric/Behavioral:  Negative for suicidal ideas.    All other systems reviewed and are negative.      Objective   /70   Pulse 67   Temp 36.6 °C (97.9 °F)   Ht 1.727 m (5' 8\")   Wt (!) 189 kg (416 lb 6.4 oz)   SpO2 95%   BMI 63.31 kg/m²     Physical Exam  Vitals and nursing note reviewed.   Constitutional:       Appearance: Normal appearance.   HENT:      Head: Normocephalic and atraumatic.   Eyes:      Conjunctiva/sclera: Conjunctivae normal.   Cardiovascular:      Rate and Rhythm: Normal rate and regular rhythm.      Heart sounds: Normal heart sounds.   Pulmonary:      Effort: Pulmonary effort is normal.      Breath sounds: Normal breath sounds.   Neurological:      Mental Status: He is oriented to person, place, and time.   Psychiatric:         Mood and Affect: Mood normal.         Behavior: Behavior normal.         Assessment/Plan   Diagnoses and all " orders for this visit:  Type 1 diabetes mellitus with hyperglycemia (Multi)  Class 3 severe obesity due to excess calories with serious comorbidity and body mass index (BMI) of 60.0 to 69.9 in adult (Multi)  -     tirzepatide (Mounjaro) 2.5 mg/0.5 mL pen injector; Inject 2.5 mg under the skin 1 (one) time per week.

## 2024-10-11 ENCOUNTER — APPOINTMENT (OUTPATIENT)
Dept: PRIMARY CARE | Facility: CLINIC | Age: 44
End: 2024-10-11
Payer: COMMERCIAL

## 2024-10-16 NOTE — PROGRESS NOTES
Verbal consent of the patient and/or verbal parental consent for patients under the age of 18 have been obtained to conduct a physical examination at this office visit.    Established patient  History Of Present Illness  10/16/24 Ned Ernandez is a 44 y.o. male who presents for Follow up evaluation s/p 3 months since last euflexxa injection in to his BILATERAL knees.     All previous Progress Notes and imaging results related to this patients chief complaint have been reviewed in preparation for this examination.    Past Medical History  He has a past medical history of Acute myocardial infarction (Multi) (09/13/2023), CAD (coronary artery disease) (11/24/2022), Chest pain (02/07/2024), Diabetes mellitus type 1 (Multi), MI (myocardial infarction) (Multi), Mixed hyperlipidemia (09/13/2023), Osteoarthritis (10/2023), Pericardial effusion ([facility]) (02/07/2024), Primary hypertension (09/13/2023), and Varicose veins of lower extremity with inflammation (09/13/2023).    Surgical History  He has a past surgical history that includes Cardiac catheterization (11/2014); Arterial bypass surgry (12/2014); Coronary stent placement (08/31/2020); IR ablation vein (11/2022); and Spider vein injection (Right, 11/2022).     Social History  He reports that he has never smoked. He has never been exposed to tobacco smoke. He has never used smokeless tobacco. He reports that he does not drink alcohol and does not use drugs.    Family History  Family History   Problem Relation Name Age of Onset    No Known Problems Mother      No Known Problems Father      No Known Problems Sister      No Known Problems Son      Cancer Maternal Grandmother      No Known Problems Maternal Grandfather      Heart attack Paternal Grandmother      Heart disease Paternal Grandfather       Allergies  Cat dander and House dust    Historical Clinical Intake  July 7, 2023 Verbal consent of the patient and/or verbal parental consent for patients under the age of  18 have been obtained to conduct a physical examination at this office visit. Ned is a 42 year old male who was graciously referred into the office by Dr. López who present for an initial evaluation of his BILATERAL knee pain R>L. He reports a history of RIGHT knee pain with an injury occurring at 18 years old while playing racket ball. He describes his RIGHT knee pain as a throbbing ache proximal to the posterolateral aspect of his patella that will radiate into his right Achilles tendon. He states his LEFT knee pain has gotten significantly worse since his compensating for his right knee occurs along the medial aspect which he also describes as a throbbing ache. Ned has positive crepitus with reported popping with ambulation. He denies any numbness, tingling or instability. He rates his pain at 6/10 today. He takes OTC Aleve alternating with OTC Tylenol and elevates his BLE for pain management with minimal to no relief. His PCP, Dr. López, ordered an US of his RLE which was negative for DVT prior to this examination. I stressed to him the importance based off his medical history of having open heart surgery already that he should not be taking any non steroid anti-inflammatory drugs whatsoever he should only be taking Tylenol. We talked in detail about extra supplements that he could take. We also talked in detail about a significant amount of weight gain that he needs to start being very attentive to is very active and we talked about modifications and exercises that he can do regularly.  ------------------------  September 20, 2023 Verbal consent of the patient and/or verbal parental consent for patients under the age of 18 have been obtained to conduct a physical examination at this office visit. Ned is a newly established patient who is present here today for a reevaluation of his BILATERAL knees. Ned states that he has not noticed any significant change in symptoms; and he continues to c/o bony pain along  the posterior medial aspect of both knees He is taking Curcumin/Tumeric and OTC Move Free as directed previously and is also taking Tylenol Arthritis with no change in symptoms. He is unable to take NSAIDS due to history of heart surgery. He has completed 6-8 weeks of physical therapy with no significant improvement in knee pain and is continuing therapy and performing his home exercise program. He was referred back to our office for reevaluation as he was not improving. He states that his RIGHT knee hurts more than his LEFT. Pain is currently 4/10 in right posterior lateral knee and 1/10 on the LEFT knee. Pain is worse with climbing stairs, squatting, and prolonged walking. He was referred back to office by PT because not getting any better and actuaaly getting worse.  ------------------------------------------------------  10/30/23Ned Ernandez is a 43 y.o. male who presents to review his BILATERAL knee MRI which was performed at [facility]. Pt continues to have bilateral knee pain R >L. Pt reports intermittent pain on lateral and posterior knees. Worse with standing for long periods of time and with exertion. Currently treating pain with Tylenol Arthritis, Move Free, and turmeric-curcumin supplements and ice. Pt has been wearing good supportive shoes and inserts and wears compression stockings. Pt completed physical therapy and is continuing with HEP. Pt feels pain has lessened in frequency since starting supplements and doing PT. Walking up and down stairs causes pain as he has to go one foot at a time. He is an  and sits typically all day.  He would like to start injections as soon as possible.  He says that since he is plateaued so much during physical therapy and it has not helped much she is hoping that the injections will help.  I told them are going to have to get approval for Zilretta injections since he is diabetic for his right knee for his distal IT band syndrome friction syndrome.   Also had to get approval for viscosupplementation injections as well.  We also discussed he has to talk to his cardiologist who prescribed his blood thinner to see what they want us to do for stopping before injections and keeping him off of it after injections.   -------------------------------  11/28/23 Ned Ernandez is a 43 y.o. male who presents for a corticosteroid injection into the patients BILATERAL knees. Patient continues to experience pain in both knees around all sides but the lateral and posterior side are worse. He states he constantly feels the bones of both knees popping. Pain is worse when moving from standing to sitting position and ascending stairs. Pain is also worse without shoes. He treats the pain with Tylenol when needed. He continues to wear good supportive footwear with inserts with his 4mm heel lift in LEFT shoe. He completed physical therapy but continues to participate in HEP. He also continues to take Move Free an turmeric supplements. Patient discussed stopping his Xarelto and aspirin with cardiologist, Dr. Alexis during the course of the injections.   I told him we try to get approval for the Zilretta injections however his insurance did not allow it so he had a corticosteroid injection and they wanted to see how that affected his blood sugars.  I told him to monitor his blood sugars regularly and he may have to use some insulin.    --------------------------------------------------  12/11/23 Ned Ernandez is a 43 y.o. male who presents for Euflexxa Injection 1/3 injection into the patients BILATERAL knees. Patient reports resolution of BILATERAL knee pain until yesterday when he reports placing a stress on the outside of his RIGHT knee stepping out of a car. Patient notes 1/10 pain in the lateral aspect of his RIGHT knee that was not significant enough to take NSAIDs to resolve, but is still present this morning. Patient received corticosteroid injection to bilateral knees two weeks  ago. He was denied Zilretta injections by his insurance despite being diabetic. He reports that his pain corticosteroid injection improved significantly after the injections; however, he battled high blood glucose levels. He reports that he needed to take 3-4 times the amount of normal insulin to keep his sugars between 350-400. He states that this lasted 4 days before his sugar levels returned to normal just above 100.    -------------------------------------------------------  12/27/23 Ned Ernandez is a 43 y.o. male who presents for Euflexxa Injection 2/3 injection into his BILATERAL knees. Pt states that he has had noticeable improvement since beginning injections. There is intermittent sharp mid right patellar pain and posterior pain which occurs less frequently along his mid patella. Upon walking up stairs pain is exacerbated. He is not currently using any pain management interventions at this time.   Overall he is very happy with the results of the injections that he feels are helping his knees tremendously   -------------------------------------------------------  1/3/24  Ned Ernandez is a 43 y.o. male who presents for Euflexxa Injection 3/3 injection into the patients BILATERAL Knees. He states that overall he is doing significantly better since receiving Euflexxa injections into his bilateral knees. He denies any pain, instability, locking, catching, or swelling in either knee. He continues to take Tylenol and Ibuprofen for pain management as needed and is also continuing to perform his home exercise program as previously directed.  He rates his bilateral knee pain at 0/10 today.   he says he is very happy with the results of the injections and how well his knees are feeling.  He is hoping honestly he will be able to continue on getting these injections in the future and to avoid surgical intervention.  -------------------------------------------------------  04/03/24 Ned Ernandez is a 43 y.o. male  who presents for follow up of BILATERAL knees. He reports significant improvement to his knee pain from before doing the injections. On rare occasion he will feel some minimal pain to the lateral aspect of his RIGHT knee. Additionally he will feel pain when ascending stairs in the LEFT knee. Overall his pain is very infrequent and minimal and so he has not needed to treat his pain with any pain management interventions. He denies any popping, catching, locking or instability of either knee. He continues to do his home exercise program that he previously learned in Physical Therapy every day. Ned also wears good supportive shoes with full foot insoles and a heel lift in his LEFT shoe as well as his compression stockings to promote good blood flow and proper body mechanics. He has also been taking Move Free and turmeric supplementation. He is extremely happy with his improvement with the injections and would like to pursue another round in the future to avoid surgical intervention so he can enjoy riding his bike in the summer.   -------------------------------------------------------  06/10/24 Ned Ernandez is a 43 y.o. male who presents for Euflexxa #1 (NS) injection into his BILATERAL knees. He continues to perform his home strengthening and stretching exercises previously learned in Physical Therapy. He reports approximately x1-2 weeks following his last evaluation onset of sharp pains along the inferolateral aspect of his LEFT > Right patella, most notable when ascending stairs. He cites living in a home where he is unable to avoid use of stairs on a daily basis. He denies any numbness, tingling or radiculopathy at this time. He denies pain at rest, but says when present pain can reach 5/10. He is not utilizing any pain management interventions at this time. He wears bilateral compression stockings, full foot insoles, appropriate heel lift in his LEFT shoe and supportive footwear as previously recommended.  he  still is happy with the injections and will like to try to avoid surgical intervention long-term wise.  --------------------------------------------------------  06/21/24 Ned Ernandez is a 43 y.o. male who presents for Euflexxa #2 injection into his BILATERAL knees. He performs his home strengthening and stretching exercises previously learned in Physical Therapy. He describes his pain as an ache along the inferolateral aspect of both patella LEFT > Right. He notes ascending stairs is when pain is at its greatest. He denies any instability, locking, or catching at this time. He rates his pain at 1/10 today. He continues to wear bilateral compression sleeves to both lower extremities, as well as supportive footwear, full foot insoles and heel lift in his left shoe as directed. He continues to note improvement in pain and functionality overall since starting this series of injections.   ---------------------------------------------------------  07/15/24 Ned Ernandez is a 43 y.o. male who presents for Euflexxa #3 injection in to his BILATERAL knees. He performs his home strengthening and stretching exercises previously learned in Physical Therapy. He continues to experience a diffuse ache throughout both knees noting ascending stairs causes his pain to exacerbate. He rates his pain at 2/10 today citing symptoms and ROM have improved with injections. He is not presently utilizing any pain management interventions at this time.   Once again is very happy with the results of the injections and definitely would like to continue on with them in the future to try to avoid surgical intervention.         Review of Systems:  CONSTITUTIONAL:   Negative for weight change, loss of appetite, fatigue, weakness, fever, chills, night sweats, headaches .           HEENT:   Negative for cold, cough, sore throat, sinus pain, swollen lymph nodes.           OPHTHALMOLOGY:   Negative for diminished vision, blurred vision, loss of  vision, double vision.           ALLERGY:   Negative for runny nose, scratchy throat, sinus congestion, rash, facial pressure, nasal congestion, post-nasal drip.           CARDIOLOGY:   Negative for chest pain, palpitations, murmurs, irregular heart beat, shortness of breath, leg edema, dyspnea on exertion, fatigue, dizziness.           RESPIRATORY:   Negative for chest pain, shortness of breath, swelling of the legs, asthma/copd, chest congestion, pain with breathing .           GASTROENTEROLOGY:   Negative for nausea, vomitting, heartburn, constipation, diarrhea, blood in stool, change in bowel habits, black stool.           HEMATOLOGY/LYMPH:   Negative for fatigue, loss of appetitie, easy bruising, easy bleeding, anemia, abnormal bleeding, slow healing.           ENDOCRINOLOGY:   Negative for polyuria, polydipsia, polyphagia, fatigue, weight loss, weight gain, cold intolerance, heat intolerance, diabetes.           MUSCULOSKELETAL:   Positive  for Bilateral Knee pain         DERMATOLOGY:   Negative for rash, bruising.           NEUROLOGY:   Negative for tingling, numbness, gait abnormality, paresthesias, weakness, sciatica.          General Examination:  GENERAL APPEARANCE: appears well, pleasant, and cooperative, NAD.   HEENT: normal, unremarkable.   NECK: supple.   HEART: RRR, normal S1S2.   LUNGS: clear to auscultation bilaterally.   ABDOMEN: soft, NT/ND, BS present.   EXTREMITIES: no cyanosis, clubbing.   SKIN: no rash or cellulitis.   NEUROLOGIC EXAM: awake, A&O x 3, CN's II-XII grossly intact.   PSYCH: good eye contact, appropriate mood and affect .              General (KNEE):     Continued improvement since last week and starting joint lubrication injections again  Location:  BILATERAL  LEFT > RIGHT  Erythema: Negative.   Edema:   Negative   Effusion: Negative.   Warmth: Negative.   Percussion Test:  Negative:  Tuning Fork Test:  Negative:   Ecchymosis/Bruising: Negative.   Abrasions: Negative.    Palpation: Negative.  Baker's Cyst: Negative.   Orientation: Symmetrical.   Range of Motion: FROM, Not Painful  Knee Flexion ( 0-135 degrees)  Knee Extension ( 0-15 degrees)  Hip Flexion (120-130 degrees)  Hip Extension (10-20 degrees)  Hip ABduction towards body (45 degrees)  Hip ADduction away from body (30 degrees)  Internal Rotation (45 degrees)          External Rotation (50 degrees).         Muscle Strength:   Continued improvement since last week and starting joint lubrication injections again  +5/+5 Quadricep Extension  +5/+5 Hamstring Flexion  +5+5 Hip Flexion  +5+5 Hip Extension  +5/+5 Hip ABduction away from body  +5/+5 Hip ADduction towards body  +5/+5 Hip Internal Rotation  +5/+5 Hip External Rotation  +5+5 IT-Band  +5+5 Gastrocnemius  +5+5 Soleus.      DTR/Neurological:   +2/+4 Patellar (L4)  +2/+4 Posterior Tibialis Reflex and Medial Hamstrings (L5)  +2/+4 Achilles (S1).            Sensation/Neurological Lumbar:    Negative, Sensation intact:  L1: Low back, hips, and groin  L2: Low back and front of inside of upper leg/thigh  L3: Low back and front of upper leg/thigh  L4: Low back, front of upper leg/thigh, front of lower leg/calf, front of medial area of knee, and inside of ankle  L5: Low back, front and lateral knee, front and outside of lower leg/calf, top and bottom of foot and first four toes especially big toe.      Sensation/Neurological Sacrum:   Negative, Sensation Intact, 2 -Point Discrimination Test: Negative  S1: low back, back of upper leg/thigh, back and inside of lower leg, calf and little toe  S2: Buttocks, genitals, back of upper leg/thigh and calves  S3: Buttocks and genitals  S4: Buttocks  S5: Buttocks.         Pain with Squat:  Positive   Continued improvement since last week and starting joint lubrication injections again  Pain with Sumo Squat:  Positive   Continued improvement since last week and starting joint lubrication injections again  Vascular: +2/+4 Femoral, +2/+4  Dorsalis Pedis, +2/+4 Posterior Tibial  Capillary Refill less than 2 seconds.      Leg length discrepancy:  Positive: LEFT leg shorter than the Right by approximately 4 mm verified with standing erect pelvis x-ray   Feet/Foot: Positive  Valgus Foot Deformity (Pes Planus) BILATERAL.      Knee - ACL:  Anterior Drawer Test: Negative.   Lachman Test: Negative.   Prone Lachman Test: Negative.   Lelli Test: Negative.   Pivot Shift Test: Negative.   Crossover Test: Negative.      KNEE - FAT PAD:  Squeeze Test Knee Bent going into extension: Negative.      KNEE - IT BAND:   Belkys Test:  Negative:   Noble Test: Negative:         KNEE - MCL / LCL:   Valgus Stress Test:  90 degrees: Negative, 20-30 degrees: Negative.   Varus Stress Test:  90 degrees: Negative, 20-30 degrees: Negative.   Apley Distraction Test:  Positive:, Medial:, MCL:.   Thessaly Test:  Negative        KNEE - MENISCUS:   Continued improvement since last week and starting joint lubrication injections again  Apley Compression Test:  Negative  Duck Walk Test:  Negative.   Stienmann 1 Test:  Negative.   Stienmann 2 Test:  Negative.   Porfirio Test:  Negative  Bragard's Test:  Negative.   Bounce Home Test:  Negative.   Payr Test:  Negative.   Thessaly Test:  Negative:  Yordan's Test:  Negative.         KNEE - PATELLA:    Continued improvement since last week and starting joint lubrication injections again  Apprehension Test:   Positive.  Glide Test:  Negative.   Facet Tenderness Test:   Positive..   Medial Patellar Plica Test:  Negative.   Grind Test:   Positive.  Patella Tracking Test Positive.  Medial/Lateral Subluxation Test:  Negative.   Suppression Test:  Negative.         Knee - PCL/POSTERIOR LATERAL CORNER:  Posterior Drawer Test: Negative.   Jimenes 90/90 (Sag Sign) Test: Negative.   Dial Test: Negative.   Reverse Lachman Test: Negative.   Quad Activation Test: Negative.   ER / Recurvatum Test: Negative.   Reverse Pivot Shift Test: Negative.      KNEE -  POPLITEUS:  Jean Pierre's Test: Negative.      KNEE - QUADRICEPS: Continued improvement since last week and starting joint lubrication injections again  just future  Dreyer Test: Negative.   VMO Test: Negative.   VLO Test: Negative.      Hip/Pelvis - Sacrum:  Leg Length Supine:  Negative  Leg Length Supine to Seated (Derbolowsky Sign):  Negative  Standing Flexion Test:  Negative.   Seated Flexion Test:  Negative   Spring Test:  Negative   Sacral Somatic Dysfunction:  Negative   Sacroiliac (SI) Joint Fixation Test:  Negative   Hip Flexor Tightness:  Negative   Hamstring Tightness:  Positive.      Diagnostic studies:  MRI KNEE LEFT performed at [facility] on 10/13/23     IMPRESSION MRI KNEE LEFT:  Intact menisci, ligaments and tendons  Underlying degenerative changes as follows: Mild chondral thinning of the weightbearing aspect of the lateral tibial plateau, mild thinning of the articular cartilage of the weightbearing aspects of the medial femoral condyle and tibial plateau, full thickness cartilage loss of the medial patellar facet and high-grade chondral loss of the cartilage of the lateral facet with subchondral signal change noted laterally.  Joint effusion - small suprapatellar  Diffuse subcutaneous soft tissue edema  --------------------------  MRI KNEE RIGHT performed at [facility] on 10/13/23     IMPRESSION:MRI KNEE RIGHT:    IT band syndrome as follows: Edema like signal in between the IT band and lateral femoral condyle compatible with IT band friction syndrome.  Complex tear of the posterior horn and body of the medial meniscus   Osteoarthritis of the medial patellofemoral compartments  Joint effusion - moderate sized suprapatellar  Subcutaneous soft tissue edema     ----------------------  Standing erect pelvis x-ray shows the following:  Left leg shorter than right leg at the iliac crest by 6 millimeters mm  Right leg shorter than left leg at Sacral Base by 6 millimeters mm  Left leg shorter  than right leg at midline Femoral Heads by 4 mm  Overall difference left leg shorter than right leg by approximately 4 millimeters                    PROCEDURE: PELVIS 1 OR 2 VIEW - TXR 0039  STUDY: PELVIS 1 OR 2 VIEW 7/7/2023 8:15 am     IMPRESSION PELVIS XR:  Normal erect AP view of the pelvis     Original Interpreting Physician: ISRRAEL STOVER M.D.  Original Transcribed by/Date: MMODAL Jul 7 2023 7:42A  ----------------------------------------------------  PROCEDURE: KNEE BI MIN 4 VIEW - TXR 0220  STUDY: KNEE BI MIN 4 VIEW 7/7/2023 8:15 am     IMPRESSION BILATERAL KNEE XR:  There is narrowing of the patellofemoral and medial compartments of each knee with the medial compartmental narrowing more pronounced on the right than on the left. There is mild osteophytosis of each knee observed as well.     Original Interpreting Physician: ISRRAEL STOVER M.D.  Original Transcribed by/Date: MMODAL Jul 7 2023 7:42A  ----------------------------------------------------  PROCEDURE: LOWER LEG RT NON VASC LIMITED - [facility] [id number]  STUDY:LOWER LEG RT NON VASC LIMITED; 6/16/2023 3:49 pm     IMPRESSION RLE US:  Normal ultrasound of the right popliteal fossa.     Original Interpreting Physician: ABBY PALMA M.D.  Original Transcribed by/Date: ODAL Jun 16 2023 3:04P    Assessment   1. Acute pain of left knee        2. Primary osteoarthritis of left knee        3. Acute pain of right knee        4. Primary osteoarthritis of right knee        5. Effusion of left knee        6. Patellofemoral disorders, left knee        7. Acute medial meniscal injury of left knee, subsequent encounter        8. Patellar tendinitis, left knee        9. Iliotibial band syndrome of left side        10. Effusion of right knee        11. Acute medial meniscal injury of right knee, subsequent encounter        12. Patellar tendinitis, right knee        13. Patellofemoral disorders, right knee        14. Iliotibial band syndrome of right side        15. Hamstring  tightness        16. Leg length discrepancy                   Treatment or Intervention:  May continue to alternate ice and moist heat as needed  Once again, reviewed degenerative joint disease of the knees, patellofemoral tracking syndrome, and/or patellar tendinitis as well as meniscus injury in detail with the patient to the level of their understanding  Stressed the importance of continuing to perform home exercises previously learned in Physical Therapy daily   Once again, reviewed home exercises to be performed by the patient routinely  Once again stressed the importance of  continuing to wear shoes with good stability control to help with the biomechanics affecting the knees and lower extremities  Once again, stressed the importance of  continuing to wear full foot insoles to help with the biomechanics affecting the knees and lower extremities  Once again, recommendation to continue over-the-counter vitamin-D 2 -3000+ milligrams a day, as well as a daily multivitamin.  May continue to take over-the-counter curcumin, turmeric, boswellia, as well as egg shell membrane as directed to aid with joint inflammation.  May continue to take over-the-counter Move Free for joint health.  Patient advised regarding the risks and/or potential adverse reactions and/or side effects of any prescribed medications along with any over-the-counter medications or any supplements used. Patient advised to seek immediate medical care if any adverse reactions occur. The patient and/or patient(s) parent(s) verbalized their understanding  Once again, discussed in detail with the patient to the level of their understanding the possibility in the future of regenerative injections   Once again, reviewed in detail about exercise modifications and how to avoid repetitive overuse his knees.  Once again, reviewed different dietary modifications, including intermittent fasting to aid with weight management   once again Stressed the importance of  continuing to wear 4 millimeter heel lift placed into his left shoe to accommodate leg-length discrepancy found on standing erect pelvis x-ray    Follow-up in     Please note that this report has been produced using speech recognition software.  It may contain errors related to grammar, punctuation or spelling.  Electronically signed, but not reviewed.  Jair Woodruff D.O. RIO, Director of Sports Medicine       MARCO PARKER on 10/16/24 at 1:43 PM.     RIO Lamb DO      injected bilateral knees with Visco supplementation of Euflexxa

## 2024-10-18 ENCOUNTER — APPOINTMENT (OUTPATIENT)
Dept: SPORTS MEDICINE | Facility: CLINIC | Age: 44
End: 2024-10-18
Payer: COMMERCIAL

## 2024-10-18 DIAGNOSIS — M62.89 HAMSTRING TIGHTNESS: ICD-10-CM

## 2024-10-18 DIAGNOSIS — S83.8X1D ACUTE MEDIAL MENISCAL INJURY OF RIGHT KNEE, SUBSEQUENT ENCOUNTER: ICD-10-CM

## 2024-10-18 DIAGNOSIS — M17.12 PRIMARY OSTEOARTHRITIS OF LEFT KNEE: ICD-10-CM

## 2024-10-18 DIAGNOSIS — M22.2X1 PATELLOFEMORAL DISORDERS, RIGHT KNEE: ICD-10-CM

## 2024-10-18 DIAGNOSIS — M25.562 ACUTE PAIN OF LEFT KNEE: ICD-10-CM

## 2024-10-18 DIAGNOSIS — M76.32 ILIOTIBIAL BAND SYNDROME OF LEFT SIDE: ICD-10-CM

## 2024-10-18 DIAGNOSIS — M25.462 EFFUSION OF LEFT KNEE: ICD-10-CM

## 2024-10-18 DIAGNOSIS — M22.2X2 PATELLOFEMORAL DISORDERS, LEFT KNEE: ICD-10-CM

## 2024-10-18 DIAGNOSIS — M21.70 LEG LENGTH DISCREPANCY: ICD-10-CM

## 2024-10-18 DIAGNOSIS — M76.31 ILIOTIBIAL BAND SYNDROME OF RIGHT SIDE: ICD-10-CM

## 2024-10-18 DIAGNOSIS — M25.461 EFFUSION OF RIGHT KNEE: ICD-10-CM

## 2024-10-18 DIAGNOSIS — M76.51 PATELLAR TENDINITIS, RIGHT KNEE: ICD-10-CM

## 2024-10-18 DIAGNOSIS — M25.561 ACUTE PAIN OF RIGHT KNEE: ICD-10-CM

## 2024-10-18 DIAGNOSIS — S83.8X2D ACUTE MEDIAL MENISCAL INJURY OF LEFT KNEE, SUBSEQUENT ENCOUNTER: ICD-10-CM

## 2024-10-18 DIAGNOSIS — M17.11 PRIMARY OSTEOARTHRITIS OF RIGHT KNEE: ICD-10-CM

## 2024-10-18 DIAGNOSIS — M76.52 PATELLAR TENDINITIS, LEFT KNEE: ICD-10-CM

## 2024-10-27 ENCOUNTER — HOSPITAL ENCOUNTER (EMERGENCY)
Facility: HOSPITAL | Age: 44
Discharge: HOME | End: 2024-10-27
Attending: STUDENT IN AN ORGANIZED HEALTH CARE EDUCATION/TRAINING PROGRAM
Payer: COMMERCIAL

## 2024-10-27 ENCOUNTER — APPOINTMENT (OUTPATIENT)
Dept: RADIOLOGY | Facility: HOSPITAL | Age: 44
End: 2024-10-27
Payer: COMMERCIAL

## 2024-10-27 ENCOUNTER — APPOINTMENT (OUTPATIENT)
Dept: CARDIOLOGY | Facility: HOSPITAL | Age: 44
End: 2024-10-27
Payer: COMMERCIAL

## 2024-10-27 VITALS
BODY MASS INDEX: 47.74 KG/M2 | SYSTOLIC BLOOD PRESSURE: 167 MMHG | OXYGEN SATURATION: 96 % | TEMPERATURE: 97.5 F | HEIGHT: 68 IN | HEART RATE: 74 BPM | RESPIRATION RATE: 10 BRPM | DIASTOLIC BLOOD PRESSURE: 88 MMHG | WEIGHT: 315 LBS

## 2024-10-27 DIAGNOSIS — T14.8XXA ABRASION: ICD-10-CM
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
## 2024-10-27 LAB
ALBUMIN SERPL BCP-MCNC: 3.4 G/DL (ref 3.4–5)
ALP SERPL-CCNC: 74 U/L (ref 33–120)
ALT SERPL W P-5'-P-CCNC: 10 U/L (ref 10–52)
ANION GAP SERPL CALC-SCNC: 8 MMOL/L (ref 10–20)
AST SERPL W P-5'-P-CCNC: 13 U/L (ref 9–39)
BASOPHILS # BLD AUTO: 0.04 X10*3/UL (ref 0–0.1)
BASOPHILS NFR BLD AUTO: 0.4 %
BILIRUB SERPL-MCNC: 0.3 MG/DL (ref 0–1.2)
BUN SERPL-MCNC: 23 MG/DL (ref 6–23)
CALCIUM SERPL-MCNC: 8.4 MG/DL (ref 8.6–10.3)
CHLORIDE SERPL-SCNC: 102 MMOL/L (ref 98–107)
CO2 SERPL-SCNC: 28 MMOL/L (ref 21–32)
CREAT SERPL-MCNC: 1.04 MG/DL (ref 0.5–1.3)
EGFRCR SERPLBLD CKD-EPI 2021: 90 ML/MIN/1.73M*2
EOSINOPHIL # BLD AUTO: 0.21 X10*3/UL (ref 0–0.7)
EOSINOPHIL NFR BLD AUTO: 2.1 %
ERYTHROCYTE [DISTWIDTH] IN BLOOD BY AUTOMATED COUNT: 13.7 % (ref 11.5–14.5)
GLUCOSE SERPL-MCNC: 306 MG/DL (ref 74–99)
HCT VFR BLD AUTO: 40.9 % (ref 41–52)
HGB BLD-MCNC: 13.1 G/DL (ref 13.5–17.5)
IMM GRANULOCYTES # BLD AUTO: 0.04 X10*3/UL (ref 0–0.7)
IMM GRANULOCYTES NFR BLD AUTO: 0.4 % (ref 0–0.9)
INR PPP: 1.1 (ref 0.9–1.1)
LYMPHOCYTES # BLD AUTO: 2.53 X10*3/UL (ref 1.2–4.8)
LYMPHOCYTES NFR BLD AUTO: 25.7 %
MCH RBC QN AUTO: 27.1 PG (ref 26–34)
MCHC RBC AUTO-ENTMCNC: 32 G/DL (ref 32–36)
MCV RBC AUTO: 85 FL (ref 80–100)
MONOCYTES # BLD AUTO: 0.7 X10*3/UL (ref 0.1–1)
MONOCYTES NFR BLD AUTO: 7.1 %
NEUTROPHILS # BLD AUTO: 6.33 X10*3/UL (ref 1.2–7.7)
NEUTROPHILS NFR BLD AUTO: 64.3 %
NRBC BLD-RTO: 0 /100 WBCS (ref 0–0)
PLATELET # BLD AUTO: 226 X10*3/UL (ref 150–450)
POTASSIUM SERPL-SCNC: 4.2 MMOL/L (ref 3.5–5.3)
PROT SERPL-MCNC: 6.3 G/DL (ref 6.4–8.2)
PROTHROMBIN TIME: 12 SECONDS (ref 9.8–12.8)
RBC # BLD AUTO: 4.83 X10*6/UL (ref 4.5–5.9)
SODIUM SERPL-SCNC: 134 MMOL/L (ref 136–145)
WBC # BLD AUTO: 9.9 X10*3/UL (ref 4.4–11.3)

## 2024-10-27 PROCEDURE — 36415 COLL VENOUS BLD VENIPUNCTURE: CPT | Performed by: STUDENT IN AN ORGANIZED HEALTH CARE EDUCATION/TRAINING PROGRAM

## 2024-10-27 PROCEDURE — 72125 CT NECK SPINE W/O DYE: CPT | Performed by: RADIOLOGY

## 2024-10-27 PROCEDURE — 80053 COMPREHEN METABOLIC PANEL: CPT | Performed by: STUDENT IN AN ORGANIZED HEALTH CARE EDUCATION/TRAINING PROGRAM

## 2024-10-27 PROCEDURE — 76377 3D RENDER W/INTRP POSTPROCES: CPT

## 2024-10-27 PROCEDURE — 85025 COMPLETE CBC W/AUTO DIFF WBC: CPT | Performed by: STUDENT IN AN ORGANIZED HEALTH CARE EDUCATION/TRAINING PROGRAM

## 2024-10-27 PROCEDURE — 70486 CT MAXILLOFACIAL W/O DYE: CPT

## 2024-10-27 PROCEDURE — 93005 ELECTROCARDIOGRAM TRACING: CPT

## 2024-10-27 PROCEDURE — 85610 PROTHROMBIN TIME: CPT | Performed by: STUDENT IN AN ORGANIZED HEALTH CARE EDUCATION/TRAINING PROGRAM

## 2024-10-27 PROCEDURE — 76377 3D RENDER W/INTRP POSTPROCES: CPT | Performed by: RADIOLOGY

## 2024-10-27 PROCEDURE — 72125 CT NECK SPINE W/O DYE: CPT

## 2024-10-27 PROCEDURE — 99285 EMERGENCY DEPT VISIT HI MDM: CPT

## 2024-10-27 PROCEDURE — 70450 CT HEAD/BRAIN W/O DYE: CPT | Performed by: RADIOLOGY

## 2024-10-27 PROCEDURE — 99285 EMERGENCY DEPT VISIT HI MDM: CPT | Mod: 25 | Performed by: STUDENT IN AN ORGANIZED HEALTH CARE EDUCATION/TRAINING PROGRAM

## 2024-10-27 PROCEDURE — 70486 CT MAXILLOFACIAL W/O DYE: CPT | Performed by: RADIOLOGY

## 2024-10-27 PROCEDURE — 70450 CT HEAD/BRAIN W/O DYE: CPT

## 2024-10-27 PROCEDURE — 2500000001 HC RX 250 WO HCPCS SELF ADMINISTERED DRUGS (ALT 637 FOR MEDICARE OP): Performed by: STUDENT IN AN ORGANIZED HEALTH CARE EDUCATION/TRAINING PROGRAM

## 2024-10-27 RX ORDER — LIDOCAINE HYDROCHLORIDE AND EPINEPHRINE 10; 10 MG/ML; UG/ML
INJECTION, SOLUTION INFILTRATION; PERINEURAL
Status: DISCONTINUED
Start: 2024-10-27 | End: 2024-10-28 | Stop reason: HOSPADM

## 2024-10-27 RX ORDER — LIDOCAINE HYDROCHLORIDE AND EPINEPHRINE 10; 10 MG/ML; UG/ML
5 INJECTION, SOLUTION INFILTRATION; PERINEURAL ONCE
Status: DISCONTINUED | OUTPATIENT
Start: 2024-10-27 | End: 2024-10-28 | Stop reason: HOSPADM

## 2024-10-27 RX ORDER — BACITRACIN 500 [USP'U]/G
OINTMENT TOPICAL 3 TIMES DAILY
Status: DISCONTINUED | OUTPATIENT
Start: 2024-10-27 | End: 2024-10-28 | Stop reason: HOSPADM

## 2024-10-27 ASSESSMENT — LIFESTYLE VARIABLES
EVER HAD A DRINK FIRST THING IN THE MORNING TO STEADY YOUR NERVES TO GET RID OF A HANGOVER: NO
EVER FELT BAD OR GUILTY ABOUT YOUR DRINKING: NO
HAVE PEOPLE ANNOYED YOU BY CRITICIZING YOUR DRINKING: NO
TOTAL SCORE: 0
HAVE YOU EVER FELT YOU SHOULD CUT DOWN ON YOUR DRINKING: NO

## 2024-10-27 ASSESSMENT — PAIN DESCRIPTION - LOCATION: LOCATION: HEAD

## 2024-10-27 ASSESSMENT — COLUMBIA-SUICIDE SEVERITY RATING SCALE - C-SSRS
1. IN THE PAST MONTH, HAVE YOU WISHED YOU WERE DEAD OR WISHED YOU COULD GO TO SLEEP AND NOT WAKE UP?: NO
6. HAVE YOU EVER DONE ANYTHING, STARTED TO DO ANYTHING, OR PREPARED TO DO ANYTHING TO END YOUR LIFE?: NO
2. HAVE YOU ACTUALLY HAD ANY THOUGHTS OF KILLING YOURSELF?: NO

## 2024-10-27 ASSESSMENT — PAIN - FUNCTIONAL ASSESSMENT
PAIN_FUNCTIONAL_ASSESSMENT: 0-10
PAIN_FUNCTIONAL_ASSESSMENT: 0-10

## 2024-10-27 ASSESSMENT — PAIN DESCRIPTION - PAIN TYPE: TYPE: ACUTE PAIN

## 2024-10-27 ASSESSMENT — PAIN SCALES - GENERAL
PAINLEVEL_OUTOF10: 5 - MODERATE PAIN
PAINLEVEL_OUTOF10: 0 - NO PAIN

## 2024-10-28 LAB
ATRIAL RATE: 83 BPM
P AXIS: 47 DEGREES
P OFFSET: 184 MS
P ONSET: 133 MS
PR INTERVAL: 160 MS
Q ONSET: 213 MS
QRS COUNT: 13 BEATS
QRS DURATION: 98 MS
QT INTERVAL: 368 MS
QTC CALCULATION(BAZETT): 432 MS
QTC FREDERICIA: 410 MS
R AXIS: 25 DEGREES
T AXIS: 73 DEGREES
T OFFSET: 397 MS
VENTRICULAR RATE: 83 BPM

## 2024-11-08 ENCOUNTER — APPOINTMENT (OUTPATIENT)
Dept: PRIMARY CARE | Facility: CLINIC | Age: 44
End: 2024-11-08
Payer: COMMERCIAL

## 2024-11-15 ENCOUNTER — APPOINTMENT (OUTPATIENT)
Dept: SPORTS MEDICINE | Facility: CLINIC | Age: 44
End: 2024-11-15
Payer: COMMERCIAL

## 2024-11-15 DIAGNOSIS — E53.8 VITAMIN B12 DEFICIENCY: ICD-10-CM

## 2024-11-21 RX ORDER — CYANOCOBALAMIN 1000 UG/ML
INJECTION, SOLUTION INTRAMUSCULAR; SUBCUTANEOUS
Qty: 6 ML | Refills: 3 | Status: SHIPPED | OUTPATIENT
Start: 2024-11-21

## 2024-12-11 ENCOUNTER — OFFICE VISIT (OUTPATIENT)
Dept: PRIMARY CARE | Facility: CLINIC | Age: 44
End: 2024-12-11
Payer: COMMERCIAL

## 2024-12-11 VITALS
OXYGEN SATURATION: 96 % | BODY MASS INDEX: 47.74 KG/M2 | HEIGHT: 68 IN | SYSTOLIC BLOOD PRESSURE: 124 MMHG | DIASTOLIC BLOOD PRESSURE: 80 MMHG | WEIGHT: 315 LBS | TEMPERATURE: 98.1 F | HEART RATE: 72 BPM

## 2024-12-11 DIAGNOSIS — J01.00 ACUTE NON-RECURRENT MAXILLARY SINUSITIS: ICD-10-CM

## 2024-12-11 DIAGNOSIS — I25.118 CORONARY ARTERY DISEASE OF NATIVE ARTERY OF NATIVE HEART WITH STABLE ANGINA PECTORIS: ICD-10-CM

## 2024-12-11 DIAGNOSIS — J02.9 SORE THROAT: ICD-10-CM

## 2024-12-11 DIAGNOSIS — E11.65 TYPE 2 DIABETES MELLITUS WITH HYPERGLYCEMIA, WITH LONG-TERM CURRENT USE OF INSULIN: Primary | ICD-10-CM

## 2024-12-11 DIAGNOSIS — Z79.4 TYPE 2 DIABETES MELLITUS WITH HYPERGLYCEMIA, WITH LONG-TERM CURRENT USE OF INSULIN: Primary | ICD-10-CM

## 2024-12-11 LAB — POC HEMOGLOBIN A1C: 8 % (ref 4.2–6.5)

## 2024-12-11 PROCEDURE — 4010F ACE/ARB THERAPY RXD/TAKEN: CPT | Performed by: FAMILY MEDICINE

## 2024-12-11 PROCEDURE — 3048F LDL-C <100 MG/DL: CPT | Performed by: FAMILY MEDICINE

## 2024-12-11 PROCEDURE — 3074F SYST BP LT 130 MM HG: CPT | Performed by: FAMILY MEDICINE

## 2024-12-11 PROCEDURE — 3062F POS MACROALBUMINURIA REV: CPT | Performed by: FAMILY MEDICINE

## 2024-12-11 PROCEDURE — 3008F BODY MASS INDEX DOCD: CPT | Performed by: FAMILY MEDICINE

## 2024-12-11 PROCEDURE — 3052F HG A1C>EQUAL 8.0%<EQUAL 9.0%: CPT | Performed by: FAMILY MEDICINE

## 2024-12-11 PROCEDURE — 1036F TOBACCO NON-USER: CPT | Performed by: FAMILY MEDICINE

## 2024-12-11 PROCEDURE — 83036 HEMOGLOBIN GLYCOSYLATED A1C: CPT | Performed by: FAMILY MEDICINE

## 2024-12-11 PROCEDURE — 3079F DIAST BP 80-89 MM HG: CPT | Performed by: FAMILY MEDICINE

## 2024-12-11 PROCEDURE — 99214 OFFICE O/P EST MOD 30 MIN: CPT | Performed by: FAMILY MEDICINE

## 2024-12-11 RX ORDER — CEFDINIR 300 MG/1
300 CAPSULE ORAL 2 TIMES DAILY
Qty: 20 CAPSULE | Refills: 0 | Status: SHIPPED | OUTPATIENT
Start: 2024-12-11 | End: 2024-12-21

## 2024-12-11 RX ORDER — NITROGLYCERIN 0.4 MG/1
0.4 TABLET SUBLINGUAL EVERY 5 MIN PRN
Qty: 25 TABLET | Refills: 3 | Status: SHIPPED | OUTPATIENT
Start: 2024-12-11

## 2024-12-11 ASSESSMENT — PAIN SCALES - GENERAL: PAINLEVEL_OUTOF10: 2

## 2024-12-17 ENCOUNTER — OFFICE VISIT (OUTPATIENT)
Dept: URGENT CARE | Age: 44
End: 2024-12-17
Payer: COMMERCIAL

## 2024-12-17 ENCOUNTER — ANCILLARY PROCEDURE (OUTPATIENT)
Dept: URGENT CARE | Age: 44
End: 2024-12-17
Payer: COMMERCIAL

## 2024-12-17 VITALS
TEMPERATURE: 98.2 F | DIASTOLIC BLOOD PRESSURE: 89 MMHG | HEIGHT: 68 IN | RESPIRATION RATE: 20 BRPM | BODY MASS INDEX: 47.74 KG/M2 | WEIGHT: 315 LBS | OXYGEN SATURATION: 98 % | SYSTOLIC BLOOD PRESSURE: 153 MMHG | HEART RATE: 72 BPM

## 2024-12-17 DIAGNOSIS — B34.9 VIRAL SYNDROME: ICD-10-CM

## 2024-12-17 DIAGNOSIS — R05.1 ACUTE COUGH: Primary | ICD-10-CM

## 2024-12-17 DIAGNOSIS — R05.1 ACUTE COUGH: ICD-10-CM

## 2024-12-17 DIAGNOSIS — J18.9 PNEUMONIA OF LEFT LOWER LOBE DUE TO INFECTIOUS ORGANISM: ICD-10-CM

## 2024-12-17 LAB — POC SARS-COV-2 AG BINAX: NORMAL

## 2024-12-17 PROCEDURE — 3048F LDL-C <100 MG/DL: CPT | Performed by: EMERGENCY MEDICINE

## 2024-12-17 PROCEDURE — 87811 SARS-COV-2 COVID19 W/OPTIC: CPT | Performed by: EMERGENCY MEDICINE

## 2024-12-17 PROCEDURE — 3062F POS MACROALBUMINURIA REV: CPT | Performed by: EMERGENCY MEDICINE

## 2024-12-17 PROCEDURE — 4010F ACE/ARB THERAPY RXD/TAKEN: CPT | Performed by: EMERGENCY MEDICINE

## 2024-12-17 PROCEDURE — 3008F BODY MASS INDEX DOCD: CPT | Performed by: EMERGENCY MEDICINE

## 2024-12-17 PROCEDURE — 1036F TOBACCO NON-USER: CPT | Performed by: EMERGENCY MEDICINE

## 2024-12-17 PROCEDURE — 3077F SYST BP >= 140 MM HG: CPT | Performed by: EMERGENCY MEDICINE

## 2024-12-17 PROCEDURE — 3079F DIAST BP 80-89 MM HG: CPT | Performed by: EMERGENCY MEDICINE

## 2024-12-17 PROCEDURE — 3052F HG A1C>EQUAL 8.0%<EQUAL 9.0%: CPT | Performed by: EMERGENCY MEDICINE

## 2024-12-17 PROCEDURE — 71046 X-RAY EXAM CHEST 2 VIEWS: CPT | Performed by: EMERGENCY MEDICINE

## 2024-12-17 PROCEDURE — 99203 OFFICE O/P NEW LOW 30 MIN: CPT | Performed by: EMERGENCY MEDICINE

## 2024-12-17 RX ORDER — AMOXICILLIN AND CLAVULANATE POTASSIUM 875; 125 MG/1; MG/1
1 TABLET, FILM COATED ORAL 2 TIMES DAILY
Qty: 14 TABLET | Refills: 0 | Status: SHIPPED | OUTPATIENT
Start: 2024-12-17 | End: 2024-12-24

## 2024-12-17 ASSESSMENT — ENCOUNTER SYMPTOMS
SORE THROAT: 1
COUGH: 1

## 2024-12-17 NOTE — PROGRESS NOTES
Subjective   Patient ID: Ned Ernandez is a 44 y.o. male. They present today with a chief complaint of Sore Throat and Nasal Congestion (Sore throat, chest/head congestion x 1 week - Exposed to Pneumonia.).    History of Present Illness    Sore Throat   Associated symptoms include coughing.     This is a 44-year-old male who presents today complaining of sore throat nasal congestion for the past week.  The patient was seen by his primary physician a week ago was placed on antibiotic however states his symptoms have persisted.  He admits to nonproductive cough.  He denies any fever or chills.  He denies any nausea or vomiting.  Past Medical History  Allergies as of 12/17/2024 - Reviewed 12/17/2024   Allergen Reaction Noted    Cat dander Unknown 02/07/2024    House dust Unknown 01/03/2024       (Not in a hospital admission)       Past Medical History:   Diagnosis Date    Acute myocardial infarction 09/13/2023    CAD (coronary artery disease) 11/24/2022    Chest pain 02/07/2024    Diabetes mellitus type 1 (Multi)     MI (myocardial infarction) (Multi)     Mixed hyperlipidemia 09/13/2023    Osteoarthritis 10/2023    both knee    Pericardial effusion (HHS-HCC) 02/07/2024    Primary hypertension 09/13/2023    Varicose veins of lower extremity with inflammation 09/13/2023       Past Surgical History:   Procedure Laterality Date    ARTERIAL BYPASS SURGERY  12/2014    DOUBLE    CARDIAC CATHETERIZATION  11/2014    CORONARY STENT PLACEMENT  08/31/2020    X2    IR ABLATION VEIN  11/2022    SPIDER VEIN INJECTION Right 11/2022        reports that he has never smoked. He has never been exposed to tobacco smoke. He has never used smokeless tobacco. He reports that he does not drink alcohol and does not use drugs.    Review of Systems  Review of Systems   HENT:  Positive for sore throat.    Respiratory:  Positive for cough.    All other systems reviewed and are negative.                                 Objective    Vitals:     "12/17/24 0824   BP: 153/89   BP Location: Right arm   Patient Position: Sitting   BP Cuff Size: Large adult   Pulse: 72   Resp: 20   Temp: 36.8 °C (98.2 °F)   TempSrc: Oral   SpO2: 98%   Weight: (!) 181 kg (400 lb)   Height: 1.727 m (5' 8\")     No LMP for male patient.    Physical Exam  The patient is awake alert oriented x 3 in no acute distress vital signs are stable.  He is afebrile.  HEENT examination is unremarkable.  Cardiovascular is regular rate and rhythm.  Lungs are clear but diminished throughout.  Procedures    Point of Care Test & Imaging Results from this visit  Results for orders placed or performed in visit on 12/17/24   POCT BinaxNOW Covid-19 Ag Card manually resulted   Result Value Ref Range    POC TANIA-COV-2 AG  Presumptive negative test for SARS-CoV-2 (no antigen detected)     Presumptive negative test for SARS-CoV-2 (no antigen detected)      XR chest 2 views    Addendum Date: 12/17/2024    Interpreted By:  Marry Garcia, ADDENDUM: There is a triangular opacity projecting in the medial left lower lung likely representing atelectasis or pneumonia. Correlate with symptoms.   Recommend follow-up radiograph in 8-10 weeks to document resolution of this.   Updated findings communicated with referring provider at 10:06 a.m. 12/17/2024 and acknowledgement received.   Signed by: Marry Garcia 12/17/2024 10:09 AM   -------- ORIGINAL REPORT -------- Dictation workstation:   [id number]    Result Date: 12/17/2024  Interpreted By:  Marry Garcia, STUDY: Chest, 2 views.   INDICATION: Signs/Symptoms:cough.   COMPARISON: CT chest 12/20/2014.   ACCESSION NUMBER(S): [id number]   ORDERING CLINICIAN: JEANNE NAVA   FINDINGS: Median sternotomy. The cardiomediastinal silhouette size is within normal limits. Mildly prominent interstitial lung markings. There is no focal consolidation or pneumothorax. No sizeable pleural effusion.       1. Mildly prominent interstitial lung markings which may represent " interstitial pulmonary edema.   MACRO: None.   Signed by: Marry Garcia 12/17/2024 9:42 AM Dictation workstation:   [id number]     Diagnostic study results (if any) were reviewed by Ronni Howe DO.    Assessment/Plan   Allergies, medications, history, and pertinent labs/EKGs/Imaging reviewed by Ronni Howe DO.     Medical Decision Making  The patient was informed of his COVID test and chest x-ray results he was then reassured and discharged home in satisfactory condition with instruction to rest increase fluids Tylenol for fever.  The patient was also informed about his pneumonia and questionable pulmonary edema.  The patient was instructed to continue taking his Lasix and was placed on Augmentin twice daily for the next 10 days.    Orders and Diagnoses  Diagnoses and all orders for this visit:  Acute cough  -     XR chest 2 views; Future  -     POCT BinaxNOW Covid-19 Ag Card manually resulted  -     amoxicillin-pot clavulanate (Augmentin) 875-125 mg tablet; Take 1 tablet by mouth 2 times a day for 7 days.  Viral syndrome      Medical Admin Record      Patient disposition: Home    Electronically signed by Ronni Howe DO  10:11 AM

## 2024-12-29 DIAGNOSIS — J40 BRONCHITIS: Primary | ICD-10-CM

## 2024-12-29 RX ORDER — DOXYCYCLINE 100 MG/1
100 CAPSULE ORAL 2 TIMES DAILY
Qty: 20 CAPSULE | Refills: 0 | Status: SHIPPED | OUTPATIENT
Start: 2024-12-29 | End: 2025-01-08

## 2024-12-29 ASSESSMENT — ENCOUNTER SYMPTOMS
DIZZINESS: 0
SORE THROAT: 1
SINUS COMPLAINT: 1
DIFFICULTY URINATING: 0
FEVER: 0
SHORTNESS OF BREATH: 0
SINUS PAIN: 1
ENDOCRINE NEGATIVE: 1
DIARRHEA: 0
RHINORRHEA: 1
SINUS PRESSURE: 1
NAUSEA: 0

## 2024-12-30 NOTE — PROGRESS NOTES
"Subjective   Patient ID: Ned Ernandez is a 44 y.o. male who presents for Sinus Problem (X 2 weeks) and Sore Throat (Post nasal drip).    Patient with sinus pain and nasal congestion facial pressure, sore throat for approximately 2 weeks  Patient with diabetes mellitus, on insulin  History of coronary artery disease, history of MI, medication refill    Sinus Problem  Associated symptoms include congestion, sinus pressure and a sore throat. Pertinent negatives include no shortness of breath.   Sore Throat   Associated symptoms include congestion. Pertinent negatives include no diarrhea or shortness of breath.        Review of Systems   Constitutional:  Negative for fever.        Also see HPI   HENT:  Positive for congestion, postnasal drip, rhinorrhea, sinus pressure, sinus pain and sore throat.    Eyes:  Negative for visual disturbance.   Respiratory:  Negative for shortness of breath.    Cardiovascular:  Negative for chest pain.   Gastrointestinal:  Negative for diarrhea and nausea.   Endocrine: Negative.    Genitourinary:  Negative for difficulty urinating.   Skin:  Negative for rash.   Neurological:  Negative for dizziness.        No focal deficits   Psychiatric/Behavioral:  Negative for suicidal ideas.    All other systems reviewed and are negative.      Objective   /80   Pulse 72   Temp 36.7 °C (98.1 °F)   Ht 1.727 m (5' 8\")   Wt (!) 183 kg (404 lb)   SpO2 96%   BMI 61.43 kg/m²     Physical Exam  Vitals and nursing note reviewed.   Constitutional:       Appearance: Normal appearance.   HENT:      Head: Normocephalic and atraumatic.      Comments: Maxillary sinus tenderness with palpation     Mouth/Throat:      Mouth: Mucous membranes are moist.      Comments: Cobblestoning noted  Airway patent  Eyes:      Conjunctiva/sclera: Conjunctivae normal.   Cardiovascular:      Rate and Rhythm: Normal rate and regular rhythm.      Heart sounds: Normal heart sounds.   Pulmonary:      Effort: Pulmonary effort " is normal.      Breath sounds: Normal breath sounds.   Neurological:      Mental Status: He is oriented to person, place, and time.   Psychiatric:         Mood and Affect: Mood normal.         Behavior: Behavior normal.         Assessment/Plan   Diagnoses and all orders for this visit:  Type 2 diabetes mellitus with hyperglycemia, with long-term current use of insulin  -     POCT glycosylated hemoglobin (Hb A1C) manually resulted  -     Hemoglobin A1C; Future  Coronary artery disease of native artery of native heart with stable angina pectoris  -     nitroglycerin (Nitrostat) 0.4 mg SL tablet; Place 1 tablet (0.4 mg) under the tongue every 5 minutes if needed for chest pain (every 5 minutes x 3 doses as needed for chest pain).  Acute non-recurrent maxillary sinusitis  -     cefdinir (Omnicef) 300 mg capsule; Take 1 capsule (300 mg) by mouth 2 times a day for 10 days.  Sore throat  -     cefdinir (Omnicef) 300 mg capsule; Take 1 capsule (300 mg) by mouth 2 times a day for 10 days.

## 2025-01-22 DIAGNOSIS — E11.9 DIABETES MELLITUS WITHOUT COMPLICATION (MULTI): ICD-10-CM

## 2025-01-22 NOTE — TELEPHONE ENCOUNTER
Refill request last ov 12/11/24 no upcoming appt scheduled at this time [facility]

## 2025-01-23 DIAGNOSIS — E53.8 VITAMIN B12 DEFICIENCY: ICD-10-CM

## 2025-01-23 DIAGNOSIS — E11.9 DIABETES MELLITUS WITHOUT COMPLICATION (MULTI): ICD-10-CM

## 2025-01-24 ENCOUNTER — APPOINTMENT (OUTPATIENT)
Dept: CARDIOLOGY | Facility: CLINIC | Age: 45
End: 2025-01-24
Payer: COMMERCIAL

## 2025-01-27 RX ORDER — INSULIN HUMAN 100 [IU]/ML
INJECTION, SOLUTION PARENTERAL
Qty: 90 ML | Refills: 1 | Status: SHIPPED | OUTPATIENT
Start: 2025-01-27

## 2025-01-27 RX ORDER — INSULIN HUMAN 100 [IU]/ML
INJECTION, SOLUTION PARENTERAL
Qty: 90 ML | Refills: 3 | Status: SHIPPED | OUTPATIENT
Start: 2025-01-27

## 2025-01-27 RX ORDER — SYRINGE WITH NEEDLE, 1 ML 25GX5/8"
SYRINGE, EMPTY DISPOSABLE MISCELLANEOUS
Qty: 6 EACH | Refills: 3 | Status: SHIPPED | OUTPATIENT
Start: 2025-01-27

## 2025-01-29 ENCOUNTER — APPOINTMENT (OUTPATIENT)
Dept: CARDIOLOGY | Facility: CLINIC | Age: 45
End: 2025-01-29
Payer: COMMERCIAL

## 2025-01-29 VITALS
HEART RATE: 68 BPM | OXYGEN SATURATION: 96 % | SYSTOLIC BLOOD PRESSURE: 126 MMHG | WEIGHT: 315 LBS | DIASTOLIC BLOOD PRESSURE: 72 MMHG | RESPIRATION RATE: 16 BRPM | BODY MASS INDEX: 61.88 KG/M2

## 2025-01-29 DIAGNOSIS — I10 PRIMARY HYPERTENSION: ICD-10-CM

## 2025-01-29 DIAGNOSIS — I25.10 CORONARY ARTERY DISEASE INVOLVING NATIVE CORONARY ARTERY OF NATIVE HEART WITHOUT ANGINA PECTORIS: ICD-10-CM

## 2025-01-29 DIAGNOSIS — R07.2 PRECORDIAL PAIN: Primary | ICD-10-CM

## 2025-01-29 DIAGNOSIS — I21.01 ACUTE ST ELEVATION MYOCARDIAL INFARCTION (STEMI) INVOLVING LEFT MAIN CORONARY ARTERY (MULTI): ICD-10-CM

## 2025-01-29 DIAGNOSIS — E78.2 MIXED HYPERLIPIDEMIA: ICD-10-CM

## 2025-01-29 DIAGNOSIS — I31.39 PERICARDIAL EFFUSION (HHS-HCC): ICD-10-CM

## 2025-01-29 PROCEDURE — 3074F SYST BP LT 130 MM HG: CPT | Performed by: INTERNAL MEDICINE

## 2025-01-29 PROCEDURE — 99214 OFFICE O/P EST MOD 30 MIN: CPT | Performed by: INTERNAL MEDICINE

## 2025-01-29 PROCEDURE — 4010F ACE/ARB THERAPY RXD/TAKEN: CPT | Performed by: INTERNAL MEDICINE

## 2025-01-29 PROCEDURE — 3078F DIAST BP <80 MM HG: CPT | Performed by: INTERNAL MEDICINE

## 2025-01-29 ASSESSMENT — LIFESTYLE VARIABLES
HAVE YOU OR SOMEONE ELSE BEEN INJURED AS A RESULT OF YOUR DRINKING: NO
HAS A RELATIVE, FRIEND, DOCTOR, OR ANOTHER HEALTH PROFESSIONAL EXPRESSED CONCERN ABOUT YOUR DRINKING OR SUGGESTED YOU CUT DOWN: NO
HOW MANY STANDARD DRINKS CONTAINING ALCOHOL DO YOU HAVE ON A TYPICAL DAY: PATIENT DOES NOT DRINK
AUDIT-C TOTAL SCORE: 0
AUDIT TOTAL SCORE: 0
SKIP TO QUESTIONS 9-10: 1
HOW OFTEN DO YOU HAVE A DRINK CONTAINING ALCOHOL: NEVER
HOW OFTEN DO YOU HAVE SIX OR MORE DRINKS ON ONE OCCASION: NEVER

## 2025-01-29 ASSESSMENT — ENCOUNTER SYMPTOMS
DEPRESSION: 0
OCCASIONAL FEELINGS OF UNSTEADINESS: 0
LOSS OF SENSATION IN FEET: 0

## 2025-01-29 ASSESSMENT — PAIN SCALES - GENERAL: PAINLEVEL_OUTOF10: 0-NO PAIN

## 2025-01-29 ASSESSMENT — PATIENT HEALTH QUESTIONNAIRE - PHQ9: 1. LITTLE INTEREST OR PLEASURE IN DOING THINGS: NOT AT ALL
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
## 2025-02-06 DIAGNOSIS — I10 PRIMARY HYPERTENSION: ICD-10-CM

## 2025-02-06 RX ORDER — LOSARTAN POTASSIUM 50 MG/1
50 TABLET ORAL 2 TIMES DAILY
Qty: 180 TABLET | Refills: 3 | Status: SHIPPED | OUTPATIENT
Start: 2025-02-06

## 2025-02-06 NOTE — TELEPHONE ENCOUNTER
LOV: 2025  [email or web address]    Requested Prescriptions     Pending Prescriptions Disp Refills    losartan (Cozaar) 50 mg tablet [Pharmacy Med Name: LOSARTAN TABS 50MG] 180 tablet 3     Sig: TAKE 1 TABLET TWICE A DAY

## 2025-02-13 DIAGNOSIS — I10 PRIMARY HYPERTENSION: ICD-10-CM

## 2025-02-13 NOTE — TELEPHONE ENCOUNTER
[email or web address]  LOV: 2025    Requested Prescriptions     Pending Prescriptions Disp Refills    metoprolol succinate XL (Toprol-XL) 100 mg 24 hr tablet [Pharmacy Med Name: METOPROLOL SUCCINATE ER TABS 100MG] 180 tablet 3     Sig: TAKE 1 TABLET TWICE A DAY

## 2025-02-14 RX ORDER — METOPROLOL SUCCINATE 100 MG/1
100 TABLET, EXTENDED RELEASE ORAL 2 TIMES DAILY
Qty: 180 TABLET | Refills: 3 | Status: SHIPPED | OUTPATIENT
Start: 2025-02-14

## 2025-03-01 DIAGNOSIS — E78.2 MIXED HYPERLIPIDEMIA: ICD-10-CM

## 2025-03-04 RX ORDER — ATORVASTATIN CALCIUM 40 MG/1
40 TABLET, FILM COATED ORAL DAILY
Qty: 90 TABLET | Refills: 3 | Status: SHIPPED | OUTPATIENT
Start: 2025-03-04

## 2025-05-01 DIAGNOSIS — I25.10 CORONARY ARTERY DISEASE INVOLVING NATIVE HEART, UNSPECIFIED VESSEL OR LESION TYPE, UNSPECIFIED WHETHER ANGINA PRESENT: ICD-10-CM

## 2025-05-01 RX ORDER — DILTIAZEM HYDROCHLORIDE 120 MG/1
120 TABLET, FILM COATED ORAL 2 TIMES DAILY
Qty: 180 TABLET | Refills: 3 | Status: SHIPPED | OUTPATIENT
Start: 2025-05-01 | End: 2026-04-26

## 2025-05-30 DIAGNOSIS — I21.9 ACUTE MYOCARDIAL INFARCTION, UNSPECIFIED MI TYPE, UNSPECIFIED ARTERY (MULTI): ICD-10-CM

## 2025-05-30 DIAGNOSIS — I10 PRIMARY HYPERTENSION: ICD-10-CM

## 2025-05-31 RX ORDER — RANOLAZINE 500 MG/1
500 TABLET, EXTENDED RELEASE ORAL 2 TIMES DAILY
Qty: 180 TABLET | Refills: 3 | Status: SHIPPED | OUTPATIENT
Start: 2025-05-31 | End: 2026-05-26

## 2025-07-21 ENCOUNTER — APPOINTMENT (OUTPATIENT)
Dept: CARDIOLOGY | Facility: CLINIC | Age: 45
End: 2025-07-21
Payer: COMMERCIAL

## 2025-07-23 ENCOUNTER — OFFICE VISIT (OUTPATIENT)
Dept: CARDIOLOGY | Facility: CLINIC | Age: 45
End: 2025-07-23
Payer: COMMERCIAL

## 2025-07-23 VITALS
HEART RATE: 70 BPM | SYSTOLIC BLOOD PRESSURE: 126 MMHG | DIASTOLIC BLOOD PRESSURE: 82 MMHG | RESPIRATION RATE: 16 BRPM | WEIGHT: 315 LBS | OXYGEN SATURATION: 98 % | BODY MASS INDEX: 60.82 KG/M2

## 2025-07-23 DIAGNOSIS — I10 PRIMARY HYPERTENSION: ICD-10-CM

## 2025-07-23 DIAGNOSIS — E78.2 MIXED HYPERLIPIDEMIA: ICD-10-CM

## 2025-07-23 DIAGNOSIS — I31.39 PERICARDIAL EFFUSION (HHS-HCC): ICD-10-CM

## 2025-07-23 DIAGNOSIS — R07.2 PRECORDIAL PAIN: ICD-10-CM

## 2025-07-23 DIAGNOSIS — I25.10 CORONARY ARTERY DISEASE INVOLVING NATIVE CORONARY ARTERY OF NATIVE HEART WITHOUT ANGINA PECTORIS: Primary | ICD-10-CM

## 2025-07-23 PROCEDURE — 99214 OFFICE O/P EST MOD 30 MIN: CPT | Performed by: INTERNAL MEDICINE

## 2025-07-23 PROCEDURE — 3079F DIAST BP 80-89 MM HG: CPT | Performed by: INTERNAL MEDICINE

## 2025-07-23 PROCEDURE — 3074F SYST BP LT 130 MM HG: CPT | Performed by: INTERNAL MEDICINE

## 2025-07-23 PROCEDURE — 4010F ACE/ARB THERAPY RXD/TAKEN: CPT | Performed by: INTERNAL MEDICINE

## 2025-07-23 ASSESSMENT — ENCOUNTER SYMPTOMS
OCCASIONAL FEELINGS OF UNSTEADINESS: 0
LOSS OF SENSATION IN FEET: 0
DEPRESSION: 0

## 2025-07-23 ASSESSMENT — LIFESTYLE VARIABLES
AUDIT TOTAL SCORE: 0
HAS A RELATIVE, FRIEND, DOCTOR, OR ANOTHER HEALTH PROFESSIONAL EXPRESSED CONCERN ABOUT YOUR DRINKING OR SUGGESTED YOU CUT DOWN: NO
HAVE YOU OR SOMEONE ELSE BEEN INJURED AS A RESULT OF YOUR DRINKING: NO
HOW OFTEN DO YOU HAVE SIX OR MORE DRINKS ON ONE OCCASION: NEVER
SKIP TO QUESTIONS 9-10: 1
AUDIT-C TOTAL SCORE: 0
HOW MANY STANDARD DRINKS CONTAINING ALCOHOL DO YOU HAVE ON A TYPICAL DAY: PATIENT DOES NOT DRINK
HOW OFTEN DO YOU HAVE A DRINK CONTAINING ALCOHOL: NEVER

## 2025-07-23 ASSESSMENT — PAIN SCALES - GENERAL: PAINLEVEL_OUTOF10: 0-NO PAIN
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
## 2025-07-30 DIAGNOSIS — Z79.4 TYPE 2 DIABETES MELLITUS WITH HYPERGLYCEMIA, WITH LONG-TERM CURRENT USE OF INSULIN: ICD-10-CM

## 2025-07-30 DIAGNOSIS — E11.65 TYPE 2 DIABETES MELLITUS WITH HYPERGLYCEMIA, WITH LONG-TERM CURRENT USE OF INSULIN: ICD-10-CM

## 2025-07-30 NOTE — TELEPHONE ENCOUNTER
LOV:  1211/24         NEXT OV: 9/26/25                             LABS: A1c  12/11/24

## 2025-08-01 DIAGNOSIS — I25.10 CORONARY ARTERY DISEASE INVOLVING NATIVE CORONARY ARTERY OF NATIVE HEART, UNSPECIFIED WHETHER ANGINA PRESENT: ICD-10-CM

## 2025-08-01 DIAGNOSIS — I10 PRIMARY HYPERTENSION: ICD-10-CM

## 2025-08-03 RX ORDER — INSULIN DEGLUDEC INJECTION 100 U/ML
INJECTION, SOLUTION SUBCUTANEOUS
Qty: 90 ML | Refills: 2 | Status: SHIPPED | OUTPATIENT
Start: 2025-08-03

## 2025-08-06 RX ORDER — FUROSEMIDE 40 MG/1
40 TABLET ORAL DAILY
Qty: 90 TABLET | Refills: 3 | Status: SHIPPED | OUTPATIENT
Start: 2025-08-06 | End: 2026-08-01

## 2025-08-11 ENCOUNTER — TELEPHONE (OUTPATIENT)
Dept: CARDIOLOGY | Facility: CLINIC | Age: 45
End: 2025-08-11
Payer: COMMERCIAL

## 2025-08-11 DIAGNOSIS — I25.10 CAD (CORONARY ARTERY DISEASE): ICD-10-CM

## 2025-08-11 DIAGNOSIS — I25.118 CORONARY ARTERY DISEASE OF NATIVE ARTERY OF NATIVE HEART WITH STABLE ANGINA PECTORIS: ICD-10-CM

## 2025-08-14 RX ORDER — RIVAROXABAN 2.5 MG/1
2.5 TABLET, FILM COATED ORAL 2 TIMES DAILY
Qty: 180 TABLET | Refills: 3 | Status: SHIPPED | OUTPATIENT
Start: 2025-08-14

## 2025-08-25 RX ORDER — NITROGLYCERIN 0.4 MG/1
TABLET SUBLINGUAL
Qty: 25 TABLET | Refills: 3 | Status: SHIPPED | OUTPATIENT
Start: 2025-08-25

## 2025-09-26 ENCOUNTER — APPOINTMENT (OUTPATIENT)
Dept: PRIMARY CARE | Facility: CLINIC | Age: 45
End: 2025-09-26
Payer: COMMERCIAL